# Patient Record
Sex: FEMALE | Race: WHITE | NOT HISPANIC OR LATINO | Employment: OTHER | URBAN - METROPOLITAN AREA
[De-identification: names, ages, dates, MRNs, and addresses within clinical notes are randomized per-mention and may not be internally consistent; named-entity substitution may affect disease eponyms.]

---

## 2023-01-20 PROBLEM — T46.6X5A ADVERSE REACTION TO STATIN MEDICATION: Status: ACTIVE | Noted: 2023-01-20

## 2023-01-20 PROBLEM — J06.9 UPPER RESPIRATORY INFECTION WITH COUGH AND CONGESTION: Status: ACTIVE | Noted: 2023-01-20

## 2023-01-20 PROBLEM — R92.2 DENSE BREASTS: Status: ACTIVE | Noted: 2023-01-20

## 2023-01-20 PROBLEM — T14.8XXA SUPERFICIAL BRUISING: Status: ACTIVE | Noted: 2023-01-20

## 2023-01-20 PROBLEM — M62.549: Status: ACTIVE | Noted: 2023-01-20

## 2023-01-20 PROBLEM — J04.0 LARYNGITIS: Status: ACTIVE | Noted: 2023-01-20

## 2023-01-20 PROBLEM — B07.9 VERRUCOUS LESION OF SKIN: Status: ACTIVE | Noted: 2023-01-20

## 2023-01-20 PROBLEM — E55.9 VITAMIN D DEFICIENCY: Status: ACTIVE | Noted: 2023-01-20

## 2023-01-20 PROBLEM — H81.10 BPV (BENIGN POSITIONAL VERTIGO): Status: ACTIVE | Noted: 2023-01-20

## 2023-01-20 PROBLEM — Z86.010 HISTORY OF COLON POLYPS: Status: ACTIVE | Noted: 2023-01-20

## 2023-01-20 PROBLEM — E11.9 TYPE 2 DIABETES MELLITUS WITH INSULIN THERAPY (MULTI): Status: ACTIVE | Noted: 2023-01-20

## 2023-01-20 PROBLEM — E11.319 DIABETIC RETINOPATHY (MULTI): Status: ACTIVE | Noted: 2023-01-20

## 2023-01-20 PROBLEM — T21.22XA SECOND DEGREE BURN OF ABDOMEN: Status: ACTIVE | Noted: 2023-01-20

## 2023-01-20 PROBLEM — Z86.0100 HISTORY OF COLON POLYPS: Status: ACTIVE | Noted: 2023-01-20

## 2023-01-20 PROBLEM — Z98.890 HISTORY OF STENT INSERTION OF RENAL ARTERY: Status: ACTIVE | Noted: 2023-01-20

## 2023-01-20 PROBLEM — E66.811 CLASS 1 OBESITY WITH BODY MASS INDEX (BMI) OF 30.0 TO 30.9 IN ADULT: Status: ACTIVE | Noted: 2023-01-20

## 2023-01-20 PROBLEM — E11.3393 MODERATE NONPROLIFERATIVE DIABETIC RETINOPATHY OF BOTH EYES WITHOUT MACULAR EDEMA ASSOCIATED WITH TYPE 2 DIABETES MELLITUS (MULTI): Status: ACTIVE | Noted: 2023-01-20

## 2023-01-20 PROBLEM — I10 HYPERTENSION: Status: ACTIVE | Noted: 2023-01-20

## 2023-01-20 PROBLEM — R93.1 AGATSTON CAC SCORE, >400: Status: ACTIVE | Noted: 2023-01-20

## 2023-01-20 PROBLEM — E66.9 CLASS 1 OBESITY WITH BODY MASS INDEX (BMI) OF 30.0 TO 30.9 IN ADULT: Status: ACTIVE | Noted: 2023-01-20

## 2023-01-20 PROBLEM — E11.69 HYPERLIPIDEMIA ASSOCIATED WITH TYPE 2 DIABETES MELLITUS (MULTI): Status: ACTIVE | Noted: 2023-01-20

## 2023-01-20 PROBLEM — E53.8 B12 NUTRITIONAL DEFICIENCY: Status: ACTIVE | Noted: 2023-01-20

## 2023-01-20 PROBLEM — R80.9 PROTEINURIA: Status: ACTIVE | Noted: 2023-01-20

## 2023-01-20 PROBLEM — I25.10 CORONARY ARTERY DISEASE: Status: ACTIVE | Noted: 2023-01-20

## 2023-01-20 PROBLEM — E78.5 HYPERLIPIDEMIA ASSOCIATED WITH TYPE 2 DIABETES MELLITUS (MULTI): Status: ACTIVE | Noted: 2023-01-20

## 2023-01-20 PROBLEM — R92.30 DENSE BREASTS: Status: ACTIVE | Noted: 2023-01-20

## 2023-01-20 PROBLEM — R42 VERTIGO: Status: ACTIVE | Noted: 2023-01-20

## 2023-01-20 PROBLEM — R01.1 MURMUR: Status: ACTIVE | Noted: 2023-01-20

## 2023-01-20 PROBLEM — D89.89 KAPPA LIGHT CHAIN DISEASE (MULTI): Status: ACTIVE | Noted: 2023-01-20

## 2023-01-20 PROBLEM — F41.8 SITUATIONAL ANXIETY: Status: ACTIVE | Noted: 2023-01-20

## 2023-01-20 PROBLEM — N36.2 URETHRAL POLYP: Status: ACTIVE | Noted: 2023-01-20

## 2023-01-20 PROBLEM — M50.20 HERNIATED CERVICAL DISC: Status: ACTIVE | Noted: 2023-01-20

## 2023-01-20 PROBLEM — Z79.4 TYPE 2 DIABETES MELLITUS WITH INSULIN THERAPY (MULTI): Status: ACTIVE | Noted: 2023-01-20

## 2023-01-20 PROBLEM — Z86.39 HISTORY OF HYPERKALEMIA: Status: ACTIVE | Noted: 2023-01-20

## 2023-01-20 PROBLEM — E11.649 TYPE 2 DIABETES MELLITUS WITH HYPOGLYCEMIA WITHOUT COMA, WITH LONG-TERM CURRENT USE OF INSULIN (MULTI): Status: ACTIVE | Noted: 2023-01-20

## 2023-01-20 PROBLEM — M79.2 NEURALGIA: Status: ACTIVE | Noted: 2023-01-20

## 2023-01-20 PROBLEM — Z79.4 TYPE 2 DIABETES MELLITUS WITH HYPOGLYCEMIA WITHOUT COMA, WITH LONG-TERM CURRENT USE OF INSULIN (MULTI): Status: ACTIVE | Noted: 2023-01-20

## 2023-01-20 PROBLEM — G56.20 NEUROPATHY, ULNAR AT ELBOW: Status: ACTIVE | Noted: 2023-01-20

## 2023-01-20 PROBLEM — N18.30 CKD (CHRONIC KIDNEY DISEASE), STAGE III (MULTI): Status: ACTIVE | Noted: 2023-01-20

## 2023-01-20 PROBLEM — M51.9 INTERVERTEBRAL DISC DISEASE: Status: ACTIVE | Noted: 2023-01-20

## 2023-01-20 PROBLEM — Q21.10 ATRIAL SEPTAL DEFECT (HHS-HCC): Status: ACTIVE | Noted: 2023-01-20

## 2023-01-20 PROBLEM — E11.21 DIABETIC NEPHROPATHY ASSOCIATED WITH TYPE 2 DIABETES MELLITUS (MULTI): Status: ACTIVE | Noted: 2023-01-20

## 2023-01-20 PROBLEM — M85.852 OSTEOPENIA OF NECK OF LEFT FEMUR: Status: ACTIVE | Noted: 2023-01-20

## 2023-01-20 PROBLEM — D47.2 MGUS (MONOCLONAL GAMMOPATHY OF UNKNOWN SIGNIFICANCE): Status: ACTIVE | Noted: 2023-01-20

## 2023-01-20 PROBLEM — I70.1 RENAL ARTERY STENOSIS (CMS-HCC): Status: ACTIVE | Noted: 2023-01-20

## 2023-01-20 PROBLEM — M54.12 CERVICAL RADICULOPATHY: Status: ACTIVE | Noted: 2023-01-20

## 2023-01-20 PROBLEM — T30.0 BURN: Status: ACTIVE | Noted: 2023-01-20

## 2023-01-20 PROBLEM — K59.09 CHRONIC CONSTIPATION: Status: ACTIVE | Noted: 2023-01-20

## 2023-01-20 PROBLEM — M67.432 GANGLION OF LEFT WRIST: Status: ACTIVE | Noted: 2023-01-20

## 2023-01-20 PROBLEM — R82.90 ABNORMAL URINE FINDINGS: Status: ACTIVE | Noted: 2023-01-20

## 2023-01-20 RX ORDER — ASPIRIN 81 MG/1
1 TABLET ORAL DAILY
COMMUNITY
Start: 2016-02-01

## 2023-01-20 RX ORDER — MECLIZINE HCL 12.5 MG 12.5 MG/1
1-2 TABLET ORAL ONCE
COMMUNITY
Start: 2012-06-18 | End: 2023-06-07 | Stop reason: SDUPTHER

## 2023-01-20 RX ORDER — AMLODIPINE BESYLATE 5 MG/1
1 TABLET ORAL ONCE
COMMUNITY
Start: 2020-07-07 | End: 2023-06-07 | Stop reason: SDUPTHER

## 2023-01-20 RX ORDER — INSULIN HUMAN 100 [IU]/ML
14 INJECTION, SUSPENSION SUBCUTANEOUS NIGHTLY
COMMUNITY
Start: 2020-11-12 | End: 2023-10-02 | Stop reason: ALTCHOICE

## 2023-01-20 RX ORDER — ACETAMINOPHEN 500 MG
50 TABLET ORAL ONCE
COMMUNITY
Start: 2019-10-15 | End: 2023-03-07 | Stop reason: ENTERED-IN-ERROR

## 2023-01-20 RX ORDER — HYDROCHLOROTHIAZIDE 25 MG/1
1 TABLET ORAL ONCE
COMMUNITY
Start: 2021-01-20 | End: 2023-12-05 | Stop reason: SDUPTHER

## 2023-01-20 RX ORDER — MAGNESIUM 200 MG
TABLET ORAL
COMMUNITY
Start: 2022-05-11

## 2023-01-20 RX ORDER — ALBUTEROL SULFATE 90 UG/1
1 AEROSOL, METERED RESPIRATORY (INHALATION) 3 TIMES DAILY
COMMUNITY
Start: 2022-02-10 | End: 2023-10-15 | Stop reason: ALTCHOICE

## 2023-01-20 RX ORDER — LORATADINE 10 MG/1
TABLET ORAL
COMMUNITY
End: 2023-06-07 | Stop reason: SDUPTHER

## 2023-01-20 RX ORDER — FLUVASTATIN 40 MG/1
40 CAPSULE ORAL NIGHTLY
COMMUNITY
Start: 2019-12-16 | End: 2023-10-09 | Stop reason: SDUPTHER

## 2023-01-20 RX ORDER — CARVEDILOL 25 MG/1
25 TABLET ORAL
COMMUNITY
Start: 2021-04-27 | End: 2023-12-05 | Stop reason: SDUPTHER

## 2023-01-20 RX ORDER — EZETIMIBE 10 MG/1
1 TABLET ORAL DAILY
COMMUNITY
Start: 2021-03-30 | End: 2023-10-09 | Stop reason: SDUPTHER

## 2023-01-20 RX ORDER — INSULIN LISPRO 100 [IU]/ML
75 INJECTION, SOLUTION INTRAVENOUS; SUBCUTANEOUS ONCE
COMMUNITY
Start: 2020-11-12 | End: 2023-08-08 | Stop reason: SDUPTHER

## 2023-02-24 PROBLEM — M19.90 OSTEOARTHRITIS: Status: ACTIVE | Noted: 2023-02-24

## 2023-03-07 ENCOUNTER — OFFICE VISIT (OUTPATIENT)
Dept: PRIMARY CARE | Facility: CLINIC | Age: 78
End: 2023-03-07
Payer: MEDICARE

## 2023-03-07 VITALS
RESPIRATION RATE: 18 BRPM | WEIGHT: 167 LBS | DIASTOLIC BLOOD PRESSURE: 54 MMHG | HEART RATE: 64 BPM | BODY MASS INDEX: 32.61 KG/M2 | SYSTOLIC BLOOD PRESSURE: 132 MMHG

## 2023-03-07 DIAGNOSIS — D89.89 KAPPA LIGHT CHAIN DISEASE (MULTI): ICD-10-CM

## 2023-03-07 DIAGNOSIS — I25.10 CORONARY ARTERY DISEASE INVOLVING NATIVE CORONARY ARTERY OF NATIVE HEART WITHOUT ANGINA PECTORIS: ICD-10-CM

## 2023-03-07 DIAGNOSIS — E11.3393 MODERATE NONPROLIFERATIVE DIABETIC RETINOPATHY OF BOTH EYES WITHOUT MACULAR EDEMA ASSOCIATED WITH TYPE 2 DIABETES MELLITUS (MULTI): ICD-10-CM

## 2023-03-07 DIAGNOSIS — M17.11 PRIMARY OSTEOARTHRITIS OF RIGHT KNEE: ICD-10-CM

## 2023-03-07 DIAGNOSIS — N18.32 STAGE 3B CHRONIC KIDNEY DISEASE (MULTI): ICD-10-CM

## 2023-03-07 DIAGNOSIS — Z00.01 ANNUAL VISIT FOR GENERAL ADULT MEDICAL EXAMINATION WITH ABNORMAL FINDINGS: ICD-10-CM

## 2023-03-07 DIAGNOSIS — Z98.890 HISTORY OF STENT INSERTION OF RENAL ARTERY: ICD-10-CM

## 2023-03-07 DIAGNOSIS — E11.21 DIABETIC NEPHROPATHY ASSOCIATED WITH TYPE 2 DIABETES MELLITUS (MULTI): Primary | Chronic | ICD-10-CM

## 2023-03-07 DIAGNOSIS — Z79.4 TYPE 2 DIABETES MELLITUS WITH HYPOGLYCEMIA WITHOUT COMA, WITH LONG-TERM CURRENT USE OF INSULIN (MULTI): ICD-10-CM

## 2023-03-07 DIAGNOSIS — E11.9 TYPE 2 DIABETES MELLITUS WITH INSULIN THERAPY (MULTI): ICD-10-CM

## 2023-03-07 DIAGNOSIS — Z79.4 TYPE 2 DIABETES MELLITUS WITH INSULIN THERAPY (MULTI): ICD-10-CM

## 2023-03-07 DIAGNOSIS — Z00.00 ENCOUNTER FOR SUBSEQUENT ANNUAL WELLNESS VISIT (AWV) IN MEDICARE PATIENT: ICD-10-CM

## 2023-03-07 DIAGNOSIS — D47.2 MGUS (MONOCLONAL GAMMOPATHY OF UNKNOWN SIGNIFICANCE): ICD-10-CM

## 2023-03-07 DIAGNOSIS — E11.649 TYPE 2 DIABETES MELLITUS WITH HYPOGLYCEMIA WITHOUT COMA, WITH LONG-TERM CURRENT USE OF INSULIN (MULTI): ICD-10-CM

## 2023-03-07 DIAGNOSIS — I70.1 RENAL ARTERY STENOSIS (CMS-HCC): ICD-10-CM

## 2023-03-07 DIAGNOSIS — I10 PRIMARY HYPERTENSION: ICD-10-CM

## 2023-03-07 PROBLEM — J04.0 LARYNGITIS: Status: RESOLVED | Noted: 2023-01-20 | Resolved: 2023-03-07

## 2023-03-07 PROBLEM — T21.22XA SECOND DEGREE BURN OF ABDOMEN: Status: RESOLVED | Noted: 2023-01-20 | Resolved: 2023-03-07

## 2023-03-07 PROBLEM — T14.8XXA SUPERFICIAL BRUISING: Status: RESOLVED | Noted: 2023-01-20 | Resolved: 2023-03-07

## 2023-03-07 PROBLEM — R42 VERTIGO: Status: RESOLVED | Noted: 2023-01-20 | Resolved: 2023-03-07

## 2023-03-07 PROBLEM — J06.9 UPPER RESPIRATORY INFECTION WITH COUGH AND CONGESTION: Status: RESOLVED | Noted: 2023-01-20 | Resolved: 2023-03-07

## 2023-03-07 PROBLEM — F41.8 SITUATIONAL ANXIETY: Status: RESOLVED | Noted: 2023-01-20 | Resolved: 2023-03-07

## 2023-03-07 LAB — POC HEMOGLOBIN A1C: 8.2 % (ref 4.2–6.5)

## 2023-03-07 PROCEDURE — 3078F DIAST BP <80 MM HG: CPT | Performed by: INTERNAL MEDICINE

## 2023-03-07 PROCEDURE — 1036F TOBACCO NON-USER: CPT | Performed by: INTERNAL MEDICINE

## 2023-03-07 PROCEDURE — 1160F RVW MEDS BY RX/DR IN RCRD: CPT | Performed by: INTERNAL MEDICINE

## 2023-03-07 PROCEDURE — 83036 HEMOGLOBIN GLYCOSYLATED A1C: CPT | Performed by: INTERNAL MEDICINE

## 2023-03-07 PROCEDURE — 36416 COLLJ CAPILLARY BLOOD SPEC: CPT | Performed by: INTERNAL MEDICINE

## 2023-03-07 PROCEDURE — 99215 OFFICE O/P EST HI 40 MIN: CPT | Performed by: INTERNAL MEDICINE

## 2023-03-07 PROCEDURE — 1159F MED LIST DOCD IN RCRD: CPT | Performed by: INTERNAL MEDICINE

## 2023-03-07 PROCEDURE — 3075F SYST BP GE 130 - 139MM HG: CPT | Performed by: INTERNAL MEDICINE

## 2023-03-07 RX ORDER — INSULIN GLARGINE 100 [IU]/ML
18 INJECTION, SOLUTION SUBCUTANEOUS NIGHTLY
COMMUNITY
End: 2023-08-02 | Stop reason: SDUPTHER

## 2023-03-07 RX ORDER — CHOLECALCIFEROL (VITAMIN D3) 25 MCG
25 TABLET ORAL DAILY
COMMUNITY

## 2023-03-07 ASSESSMENT — PAIN SCALES - GENERAL: PAINLEVEL: 0-NO PAIN

## 2023-03-07 NOTE — PROGRESS NOTES
"Subjective   Patient ID: Eleonora Varela is a 77 y.o. female who presents for Follow-up (Pt here for follow up and review. Pt unsure of when her \"physical\" was. Pt was at Dermatologist in January and she had skin lesions removed from her face. She also had a Rt knee x-ray for which she has been doing water aerobics and Voltaren gel PRN. The \"water on the knee\" has resolved as well. Has also seen the nephrologist, endocrinologist. Needs A1C today.).    HPI   A1c 8.2 improved from 8.9 end of November.  She has retinopathy and has noticed a change in that field is more narrow out of the left eye-she needs to call dr poole and not wait until appt. In June.    Right xray pain and swelling severe djd patello femoral. Is icing water aerobics and other exercises with her sister is helping . The voltaren gel helped too    Derm had freezing of brown spots and nothing else was a problem saw pa at [facility].    Diabetic nephropathy-Saw Dr howell in January and said come back in a year-all stable    Mgus-sees dr harris in April and kappa light chain    Scribe Transcription:  Htn:She needs refills on her HCTZ and carvedilol.    Cardiac: No CP, palpitations, increased snyder  Resp: No sob, wheezing, cough  Extremities: No leg or ankle swelling, no claudication  Neuro-No dizziness, syncope, blurred vision. No new headaches.   She brought in a list of bp in today.     Diabetes:  Diabetes:Type II:Is taking medications regularly, denies side effects.  Denies  polyuria, polydipsia.  Only hypoglycemia isocc when she swims for exercises and she watches for this.she cuts back her insulin on these days.  No new numbness or paresthesias of extremities.No syncope or dizziness.No blurred vision.  Lab Results   Component Value Date    HGBA1C 8.2 (A) 03/07/2023    HGBA1C 7.4 (A) 11/15/2021     Lab Results   Component Value Date    CREATININE 1.55 (H) 01/03/2023         Lab Results   Component Value Date    GLUCOSE 435 (H) 01/03/2023    " CALCIUM 9.4 01/03/2023     01/03/2023    K 5.1 01/03/2023    CO2 29 01/03/2023    CL 99 01/03/2023    BUN 21 01/03/2023    CREATININE 1.55 (H) 01/03/2023        Meds insulin glargine/lantus and humalog. And nph.      She states she went to dermatology who froze some brown lesions off for her.    She reports doing water aerobics with her sister.     She has a f/u for her stent in June. She had her renal artery stented in May 2022 and there has been an improvement in her bp since then.       Review of Systems    Objective   /54 (BP Location: Right arm, Patient Position: Sitting, BP Cuff Size: Adult)   Pulse 64   Resp 18   Wt 75.8 kg (167 lb)   BMI 32.61 kg/m²    Latest Reference Range & Units 01/03/23 16:07   Creatinine 0.50 - 1.05 mg/dL 1.55 (H)   Blood Urea Nitrogen 6 - 23 mg/dL 21   Anion Gap 10 - 20 mmol/L 14   Bicarbonate 21 - 32 mmol/L 29   Total Protein 6.4 - 8.2 g/dL 6.3 (L)   POTASSIUM 3.5 - 5.3 mmol/L 5.1   SODIUM 136 - 145 mmol/L 137   CHLORIDE 98 - 107 mmol/L 99   PHOSPHORUS 2.5 - 4.9 mg/dL 3.7   GLUCOSE 74 - 99 mg/dL 435 (H)   Calcium 8.6 - 10.6 mg/dL 9.4   Albumin 3.4 - 5.0 g/dL 4.2   Parathyroid Hormone, Intact 18.5 - 88.0 pg/mL 81.6   Vitamin D, 25-Hydroxy, Total ng/mL 29 !   Protein Electrophoresis Comment  NORMAL   Albumin 3.4 - 5.0 g/dL 4.0   Alpha 1 Globulin 0.2 - 0.6 g/dL 0.3   Alpha 2 Globulin 0.4 - 1.1 g/dL 0.6   Gamma 0.5 - 1.4 g/dL 0.7   Beta Globulin 0.5 - 1.2 g/dL 0.7   Interpretation  NORMAL   Alpha 1 Globulin/Protein Total, Ur Not Established % 8.3   Alpha 2 Globulin/Protein Total, Ur Not Established % 11.7   Beta Globulin/Protein Total, Ur Not Established % 18.5   Gamma Globulin/Protein Total, Ur Not Established % 16.7   Path Review-Urine Protein Electrophoresis   A.HAWKINS   Path Review - Serum Protein Electrophoresis   CELESTINO   Creatinine, Urine Random 20.0 - 320.0 mg/dL 47.6   Albumin/Creatine Ratio 0.0 - 30.0 ug/mg crt 26.7   Total Protein, Urine 5 - 24 mg/dL 7    Albumin/Protein Total, Ur Not Established % 44.8   ALBUMIN (MG/L) IN URINE Not Established mg/L 12.7   GFR Female >90 mL/min/1.73m2 34 !   (H): Data is abnormally high  (L): Data is abnormally low  !: Data is abnormal   Latest Reference Range & Units 09/16/22 09:44   Bilirubin Total 0.0 - 1.2 mg/dL 0.8   Creatinine 0.50 - 1.05 mg/dL 1.25 (H)   Blood Urea Nitrogen 6 - 23 mg/dL 22   Anion Gap 10 - 20 mmol/L 13   Alkaline Phosphatase 33 - 136 U/L 80   ALT 7 - 45 U/L 21   AST 9 - 39 U/L 18   Bicarbonate 21 - 32 mmol/L 28   Total Protein 6.4 - 8.2 g/dL 6.1 (L)   POTASSIUM 3.5 - 5.3 mmol/L 4.5   SODIUM 136 - 145 mmol/L 140   CHLORIDE 98 - 107 mmol/L 104   GLUCOSE 74 - 99 mg/dL 170 (H)   Calcium 8.6 - 10.6 mg/dL 9.5   Albumin 3.4 - 5.0 g/dL 3.9   nRBC 0.0 - 0.0 /100 WBC 0.0   HEMOGLOBIN 12.0 - 16.0 g/dL 14.4   HEMATOCRIT 36.0 - 46.0 % 42.2   RED CELL DISTRIBUTION WIDTH 11.5 - 14.5 % 12.0   Lymphocytes Absolute 0.80 - 3.00 x10E9/L 1.94   Monocytes Absolute 0.05 - 0.80 x10E9/L 0.40   Eosinophils Absolute 0.00 - 0.40 x10E9/L 0.16   Basophils Absolute 0.00 - 0.10 x10E9/L 0.04   Neutrophils % 40.0 - 80.0 % 57.0   Lymphocytes % 13.0 - 44.0 % 32.7   Monocytes % 2.0 - 10.0 % 6.7   Eosinophils % 0.0 - 6.0 % 2.7   Basophils % 0.0 - 2.0 % 0.7   Neutrophils Absolute 1.60 - 5.50 x10E9/L 3.38   MCHC 32.0 - 36.0 g/dL 34.1   MCV 80 - 100 fL 89   Platelets 150 - 450 x10E9/L 196   RBC 4.00 - 5.20 x10E12/L 4.72   WBC 4.4 - 11.3 x10E9/L 5.9   Immature Granulocytes %, Automated 0.0 - 0.9 % 0.2   Ig Kappa Free Light Chain 0.33 - 1.94 mg/dL 3.28 (H)   Ig Lambda Free Light Chain 0.57 - 2.63 mg/dL 2.51   GFR Female >90 mL/min/1.73m2 45 !   Kappa/Lambda LC Ratio 0.26 - 1.65  1.31   (H): Data is abnormally high  (L): Data is abnormally low  !: Data is abnormal    Physical Exam  Vitals and nursing note reviewed.   Constitutional:       General: She is not in acute distress.     Appearance: Normal appearance. She is not ill-appearing.   HENT:       Head: Normocephalic and atraumatic.      Comments: Mask in place per protocol.  Eyes:      Conjunctiva/sclera: Conjunctivae normal.   Neck:      Vascular: No carotid bruit.   Cardiovascular:      Rate and Rhythm: Normal rate and regular rhythm.      Pulses:           Carotid pulses are 2+ on the right side and 2+ on the left side.       Posterior tibial pulses are 2+ on the right side and 2+ on the left side.      Heart sounds: Murmur heard.      Crescendo systolic murmur is present with a grade of 1/6.      No friction rub. No gallop.      Comments: No clicks  Pulmonary:      Effort: Pulmonary effort is normal.      Breath sounds: Normal breath sounds. No wheezing, rhonchi or rales.      Comments: Examined bilaterally, A/P/Lat  Abdominal:      General: Abdomen is flat. Bowel sounds are normal.      Palpations: Abdomen is soft. There is no hepatomegaly, splenomegaly, mass or pulsatile mass.      Tenderness: There is no abdominal tenderness.   Musculoskeletal:         General: No swelling or deformity.      Cervical back: Neck supple.      Right lower leg: No edema.      Left lower leg: No edema.   Lymphadenopathy:      Cervical: No cervical adenopathy.   Skin:     Coloration: Skin is not jaundiced.      Findings: No bruising, lesion or rash.   Neurological:      General: No focal deficit present.      Mental Status: She is alert. Mental status is at baseline.      Cranial Nerves: No cranial nerve deficit.      Coordination: Coordination normal.      Gait: Gait normal.      Comments: Walks in unassisted.   Psychiatric:         Mood and Affect: Mood normal.         Behavior: Behavior normal.         Thought Content: Thought content normal.         Judgment: Judgment normal.           Assessment/Plan   Her diabetes is still not well controlled but is improving. Her endo is retiring and she does not have a new endo as of yet.  We can follow here if she does not find one.    Concerned about her vision change and she  needs to see her eye doc asap.    HTN is stable no med changes  All other conditions below reviewed and stable.  S/p stenting of renal artery.  She is going kamar more focused on her diet.  Problem List Items Addressed This Visit          Circulatory    Coronary artery disease    Relevant Orders    Lipid Panel    Follow Up In [facility] - PCP    Hypertension    Relevant Orders    Follow Up In [facility] - PCP    Albumin, urine, random    Renal artery stenosis (CMS/HCC)    Relevant Orders    Follow Up In [facility] - PCP       Genitourinary    CKD (chronic kidney disease), stage III (CMS/HCC)    Relevant Orders    Follow Up In [facility] - PCP       Musculoskeletal    Osteoarthritis    Relevant Orders    Follow Up In [facility] - PCP       Endocrine/Metabolic    Diabetic nephropathy associated with type 2 diabetes mellitus (CMS/[facility]) - Primary    Relevant Orders    POCT glycosylated hemoglobin (Hb A1C) manually resulted (Completed)    Comprehensive Metabolic Panel    Follow Up In [facility] - PCP    Moderate nonproliferative diabetic retinopathy of both eyes without macular edema associated with type 2 diabetes mellitus (CMS/HCC)    Relevant Orders    Follow Up In [facility] - PCP    Type 2 diabetes mellitus with hypoglycemia without coma, with long-term current use of insulin (CMS/HCC)    Relevant Orders    TSH with reflex to Free T4 if abnormal    Vitamin B12    Follow Up In [facility] - PCP    Type 2 diabetes mellitus with insulin therapy (CMS/[facility])    Relevant Orders    Follow Up In [facility] - PCP       Immune    Kappa light chain disease (CMS/HCC)    Relevant Orders  stable and has follow up and labs planned with heme onc    Follow Up In [facility] - PCP    MGUS (monoclonal gammopathy of unknown significance)    Relevant Orders stable and see above.    Follow Up In [facility] - PCP       Other     History of stent insertion of renal artery    Relevant Orders bp is good. No new issues.    Follow Up In [facility] - PCP     Other Visit Diagnoses       Encounter for subsequent annual wellness visit (AWV) in Medicare patient        Relevant Orders    Follow Up In [facility] - PCP    Annual visit for general adult medical examination with abnormal findings        Relevant Orders    Follow Up In [facility] - PCP     Travel advice discussed. For infection anddvt prevention.

## 2023-03-07 NOTE — PATIENT INSTRUCTIONS
Keep working on getting those sugars down, and also avoiding the lows after swimming. Your A1C is still too high but it is improving.    Blood pressures are good, same medications.    Glad your knee is improving, exercise and ice as needed are important. Voltaren/diclofenac gel can be used again if needed.    Air travel: mask on plane and in airport. Keep handgel with you or wipes with 70% alcohol.  Hydrate, walk around on the plane when you can. Compression socks knee highs recommended.    The week before your visit here, fasting blood test to check lipids, thyroid b12 and chemistries.  Dr Yi checks your cbc.  Urine albumin we will do at your visit as well as the a1c.    Call your retina eye doctor for an appt. Asap about the recent vision change in the left eye.  Follow up for your wellness visit and also on the diabetes hypertension and labs in 3 months.

## 2023-03-27 LAB
ALANINE AMINOTRANSFERASE (SGPT) (U/L) IN SER/PLAS: 32 U/L (ref 7–45)
ALBUMIN (G/DL) IN SER/PLAS: 3.9 G/DL (ref 3.4–5)
ALKALINE PHOSPHATASE (U/L) IN SER/PLAS: 76 U/L (ref 33–136)
ANION GAP IN SER/PLAS: 11 MMOL/L (ref 10–20)
ASPARTATE AMINOTRANSFERASE (SGOT) (U/L) IN SER/PLAS: 22 U/L (ref 9–39)
BASOPHILS (10*3/UL) IN BLOOD BY AUTOMATED COUNT: 0.04 X10E9/L (ref 0–0.1)
BASOPHILS/100 LEUKOCYTES IN BLOOD BY AUTOMATED COUNT: 0.6 % (ref 0–2)
BILIRUBIN TOTAL (MG/DL) IN SER/PLAS: 0.9 MG/DL (ref 0–1.2)
CALCIUM (MG/DL) IN SER/PLAS: 9.4 MG/DL (ref 8.6–10.6)
CARBON DIOXIDE, TOTAL (MMOL/L) IN SER/PLAS: 30 MMOL/L (ref 21–32)
CHLORIDE (MMOL/L) IN SER/PLAS: 100 MMOL/L (ref 98–107)
CREATININE (MG/DL) IN SER/PLAS: 1.33 MG/DL (ref 0.5–1.05)
EOSINOPHILS (10*3/UL) IN BLOOD BY AUTOMATED COUNT: 0.21 X10E9/L (ref 0–0.4)
EOSINOPHILS/100 LEUKOCYTES IN BLOOD BY AUTOMATED COUNT: 3.2 % (ref 0–6)
ERYTHROCYTE DISTRIBUTION WIDTH (RATIO) BY AUTOMATED COUNT: 11.8 % (ref 11.5–14.5)
ERYTHROCYTE MEAN CORPUSCULAR HEMOGLOBIN CONCENTRATION (G/DL) BY AUTOMATED: 33.3 G/DL (ref 32–36)
ERYTHROCYTE MEAN CORPUSCULAR VOLUME (FL) BY AUTOMATED COUNT: 90 FL (ref 80–100)
ERYTHROCYTES (10*6/UL) IN BLOOD BY AUTOMATED COUNT: 4.8 X10E12/L (ref 4–5.2)
GFR FEMALE: 41 ML/MIN/1.73M2
GLUCOSE (MG/DL) IN SER/PLAS: 231 MG/DL (ref 74–99)
HEMATOCRIT (%) IN BLOOD BY AUTOMATED COUNT: 43.3 % (ref 36–46)
HEMOGLOBIN (G/DL) IN BLOOD: 14.4 G/DL (ref 12–16)
IMMATURE GRANULOCYTES/100 LEUKOCYTES IN BLOOD BY AUTOMATED COUNT: 0.3 % (ref 0–0.9)
LEUKOCYTES (10*3/UL) IN BLOOD BY AUTOMATED COUNT: 6.6 X10E9/L (ref 4.4–11.3)
LYMPHOCYTES (10*3/UL) IN BLOOD BY AUTOMATED COUNT: 2.34 X10E9/L (ref 0.8–3)
LYMPHOCYTES/100 LEUKOCYTES IN BLOOD BY AUTOMATED COUNT: 35.4 % (ref 13–44)
MONOCYTES (10*3/UL) IN BLOOD BY AUTOMATED COUNT: 0.4 X10E9/L (ref 0.05–0.8)
MONOCYTES/100 LEUKOCYTES IN BLOOD BY AUTOMATED COUNT: 6.1 % (ref 2–10)
NEUTROPHILS (10*3/UL) IN BLOOD BY AUTOMATED COUNT: 3.6 X10E9/L (ref 1.6–5.5)
NEUTROPHILS/100 LEUKOCYTES IN BLOOD BY AUTOMATED COUNT: 54.4 % (ref 40–80)
NRBC (PER 100 WBCS) BY AUTOMATED COUNT: 0 /100 WBC (ref 0–0)
PLATELETS (10*3/UL) IN BLOOD AUTOMATED COUNT: 180 X10E9/L (ref 150–450)
POTASSIUM (MMOL/L) IN SER/PLAS: 4.2 MMOL/L (ref 3.5–5.3)
PROTEIN TOTAL: 6.5 G/DL (ref 6.4–8.2)
SODIUM (MMOL/L) IN SER/PLAS: 137 MMOL/L (ref 136–145)
UREA NITROGEN (MG/DL) IN SER/PLAS: 26 MG/DL (ref 6–23)

## 2023-03-28 LAB
IMMUNOGLOBULIN LIGHT CHAINS KAPPA/LAMBDA (MASS RATIO) IN SERUM: 1.47 (ref 0.26–1.65)
IMMUNOGLOBULIN LIGHT CHAINS.KAPPA (MG/DL) IN SERUM: 3.2 MG/DL (ref 0.33–1.94)
IMMUNOGLOBULIN LIGHT CHAINS.LAMBDA (MG/DL) IN SERUM: 2.17 MG/DL (ref 0.57–2.63)

## 2023-04-05 ENCOUNTER — OFFICE VISIT (OUTPATIENT)
Dept: PRIMARY CARE | Facility: CLINIC | Age: 78
End: 2023-04-05
Payer: MEDICARE

## 2023-04-05 VITALS
WEIGHT: 158 LBS | HEIGHT: 60 IN | RESPIRATION RATE: 18 BRPM | TEMPERATURE: 99.7 F | HEART RATE: 77 BPM | SYSTOLIC BLOOD PRESSURE: 140 MMHG | OXYGEN SATURATION: 95 % | DIASTOLIC BLOOD PRESSURE: 58 MMHG | BODY MASS INDEX: 31.02 KG/M2

## 2023-04-05 DIAGNOSIS — J02.9 ACUTE PHARYNGITIS, UNSPECIFIED ETIOLOGY: ICD-10-CM

## 2023-04-05 DIAGNOSIS — I10 PRIMARY HYPERTENSION: ICD-10-CM

## 2023-04-05 DIAGNOSIS — Z79.4 TYPE 2 DIABETES MELLITUS WITH INSULIN THERAPY (MULTI): ICD-10-CM

## 2023-04-05 DIAGNOSIS — J02.0 STREP PHARYNGITIS: Primary | ICD-10-CM

## 2023-04-05 DIAGNOSIS — Z79.4 TYPE 2 DIABETES MELLITUS WITH HYPOGLYCEMIA WITHOUT COMA, WITH LONG-TERM CURRENT USE OF INSULIN (MULTI): ICD-10-CM

## 2023-04-05 DIAGNOSIS — E11.9 TYPE 2 DIABETES MELLITUS WITH INSULIN THERAPY (MULTI): ICD-10-CM

## 2023-04-05 DIAGNOSIS — E11.649 TYPE 2 DIABETES MELLITUS WITH HYPOGLYCEMIA WITHOUT COMA, WITH LONG-TERM CURRENT USE OF INSULIN (MULTI): ICD-10-CM

## 2023-04-05 DIAGNOSIS — E11.3393 MODERATE NONPROLIFERATIVE DIABETIC RETINOPATHY OF BOTH EYES WITHOUT MACULAR EDEMA ASSOCIATED WITH TYPE 2 DIABETES MELLITUS (MULTI): ICD-10-CM

## 2023-04-05 DIAGNOSIS — J02.9 ACUTE PHARYNGITIS, UNSPECIFIED ETIOLOGY: Primary | ICD-10-CM

## 2023-04-05 DIAGNOSIS — R50.81 FEVER IN OTHER DISEASES: ICD-10-CM

## 2023-04-05 DIAGNOSIS — J02.9 SORE THROAT: ICD-10-CM

## 2023-04-05 DIAGNOSIS — N18.32 STAGE 3B CHRONIC KIDNEY DISEASE (MULTI): ICD-10-CM

## 2023-04-05 LAB — POC RAPID STREP: POSITIVE

## 2023-04-05 PROCEDURE — 3077F SYST BP >= 140 MM HG: CPT | Performed by: INTERNAL MEDICINE

## 2023-04-05 PROCEDURE — 3078F DIAST BP <80 MM HG: CPT | Performed by: INTERNAL MEDICINE

## 2023-04-05 PROCEDURE — 1159F MED LIST DOCD IN RCRD: CPT | Performed by: INTERNAL MEDICINE

## 2023-04-05 PROCEDURE — 99213 OFFICE O/P EST LOW 20 MIN: CPT | Performed by: INTERNAL MEDICINE

## 2023-04-05 PROCEDURE — 87880 STREP A ASSAY W/OPTIC: CPT | Performed by: INTERNAL MEDICINE

## 2023-04-05 PROCEDURE — 1036F TOBACCO NON-USER: CPT | Performed by: INTERNAL MEDICINE

## 2023-04-05 PROCEDURE — 1160F RVW MEDS BY RX/DR IN RCRD: CPT | Performed by: INTERNAL MEDICINE

## 2023-04-05 RX ORDER — AMOXICILLIN AND CLAVULANATE POTASSIUM 875; 125 MG/1; MG/1
TABLET, FILM COATED ORAL
Qty: 20 TABLET | Refills: 0 | Status: SHIPPED | OUTPATIENT
Start: 2023-04-05 | End: 2023-06-07 | Stop reason: SDUPTHER

## 2023-04-05 RX ORDER — METFORMIN HYDROCHLORIDE 1000 MG/1
1 TABLET ORAL
COMMUNITY
Start: 2011-05-03 | End: 2023-04-05 | Stop reason: WASHOUT

## 2023-04-05 RX ORDER — FLAXSEED OIL 1000 MG
1000 CAPSULE ORAL 2 TIMES DAILY
COMMUNITY
End: 2023-04-05 | Stop reason: WASHOUT

## 2023-04-05 RX ORDER — AMOXICILLIN AND CLAVULANATE POTASSIUM 875; 125 MG/1; MG/1
875 TABLET, FILM COATED ORAL 2 TIMES DAILY
Qty: 20 TABLET | Refills: 0 | Status: SHIPPED | OUTPATIENT
Start: 2023-04-05 | End: 2023-04-15

## 2023-04-05 RX ORDER — AMOXICILLIN AND CLAVULANATE POTASSIUM 875; 125 MG/1; MG/1
TABLET, FILM COATED ORAL
Qty: 20 TABLET | Refills: 0
Start: 2023-04-05 | End: 2023-04-05 | Stop reason: SDUPTHER

## 2023-04-05 RX ORDER — AMOXICILLIN AND CLAVULANATE POTASSIUM 875; 125 MG/1; MG/1
TABLET, FILM COATED ORAL
Qty: 20 TABLET | Refills: 0 | OUTPATIENT
Start: 2023-04-05

## 2023-04-05 ASSESSMENT — PAIN SCALES - GENERAL: PAINLEVEL: 2

## 2023-04-05 NOTE — PROGRESS NOTES
Subjective   Patient ID: Eleonora Varela is a 77 y.o. female who presents for Sore Throat (Pt here for sore throat and swelling in her throat.).      END INFO FROM PRIOR VISIT  HPI  Here for work in with sore throat and she thought swollen uvula and fever, see phone message about same.  Ill x 24-48 hours. 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  Pt is diabetic, sugars are ok.  Bp was high yesterday she is not sure why. Felt fine,   Today and yesterday, no cp palp sob cough wheezing.   No diarrhea or abd pain  Can swallow liquids an dfoods, just hurts to do so.      Review of Systems    Objective   Lab Results   Component Value Date    WBC 6.6 03/27/2023    HGB 14.4 03/27/2023    HCT 43.3 03/27/2023     03/27/2023    CHOL 129 04/27/2022    TRIG 160 (H) 04/27/2022    HDL 41.8 04/27/2022    ALT 32 03/27/2023    AST 22 03/27/2023     03/27/2023    K 4.2 03/27/2023     03/27/2023    CREATININE 1.33 (H) 03/27/2023    BUN 26 (H) 03/27/2023    CO2 30 03/27/2023    TSH 2.38 04/27/2022    INR 0.9 05/09/2022    HGBA1C 8.2 (A) 03/07/2023     par  Physical ExamBP 140/58 (BP Location: Right arm, Patient Position: Sitting, BP Cuff Size: Large adult)   Pulse 77   Temp 37.6 °C (99.7 °F) (Oral)   Resp 18   Ht 1.524 m (5')   Wt 71.7 kg (158 lb)   SpO2 95%   BMI 30.86 kg/m²   Patient looks well and is in no obvious distress. But voice sounds a little horse or congested. Not in distress.  States sugar are good.  HEENT:   Normocephalic, no facial asymmetry  Eyes: Sclera and conjunctiva are clear.  Uvula minimally swollen, plenty of space there.  Tongue a little dyry and coated  Post pharynx erythema and mild swelling  No stridor  Neck: mild upper ant cerv adenopathy bilateral, no other nodes in neck and no neck swelling.Thyroid normal.   Carotid pulses normal, no carotid bruits.  Lungs : RR normal. Clear to auscultation anterior, posterior and lateral. No rales, wheezes rhonchi  or rubs. Good air exchange.  Heart: RRR. No Murmur, gallop, click or rub.    Extremities: no upper extremity edema. No lower extremity edema.   Musculoskeletal: no synovitis of joints seen. No new deformity.  Neuro: CN 2-12 intact. Alert, appropriate.   Ambulates independently.  No gross motor deficit.   No tremors.  Psych: normal mood and affect.  Skin: No rash, bruising petechiae or jaundice.      Scribe Transcription:  She states her bp was 192/68 last night and she had difficulty swallowing.     She has an issue with ceclor but not augmentin. She gets palpitations with ceclor.    She had a shot in her eye for her retinopathy last month in her left eye.    She denies abdominal pain, wheezing, and SOB. She denies feeling like she couldn’t inhale.      Assessment/Plan   Problem List Items Addressed This Visit          Endocrine/Metabolic    Moderate nonproliferative diabetic retinopathy of both eyes without macular edema associated with type 2 diabetes mellitus (CMS/[facility])    Type 2 diabetes mellitus with insulin therapy (CMS/[facility])    Relevant Medications    amoxicillin-pot clavulanate (Augmentin) 875-125 mg tablet     Other Visit Diagnoses       Strep pharyngitis    -  Primary    Relevant Medications    amoxicillin-pot clavulanate (Augmentin) 875-125 mg tablet    Sore throat        Relevant Medications    amoxicillin-pot clavulanate (Augmentin) 875-125 mg tablet    Other Relevant Orders    POCT Rapid Strep A manually resulted (Completed)    Acute pharyngitis, unspecified etiology        Relevant Medications    amoxicillin-pot clavulanate (Augmentin) 875-125 mg tablet    Fever in other diseases              Rapid strep was positive.

## 2023-04-05 NOTE — PROGRESS NOTES
Will send in augmentin now but need to see pt. Her most recent gfr is 41 and per lexicomp and med that is ok for regular dose of augmentin

## 2023-04-05 NOTE — PATIENT INSTRUCTIONS
Augmentin/amoxicillin-clavulanate one tab every 12 hours with food for 10 days.    Cool or cold liquids.  Stay hydrated  Tylenol for fever.  ER if you feel like you cannot swallow your own saliva but do not expect that to happen.    Keep regular appt.

## 2023-04-05 NOTE — TELEPHONE ENCOUNTER
"Pt calls in. She has a sore throat, throat swelling and trouble swallowing since yesterday. \"My epiglottis is swollen as well\" (I think she is referring to her uvula? Her temp is 100.0 orally last night, now 98.0 BP last night was 192/81,  This am 165/82. Covid test negative yesterday. No h/a, cough, SOB, wheezing. Just having really sore throat and thinks that it is strep.   "

## 2023-04-08 PROBLEM — R50.9 FEVER: Status: ACTIVE | Noted: 2023-04-08

## 2023-04-08 PROBLEM — J02.9 ACUTE PHARYNGITIS: Status: ACTIVE | Noted: 2023-04-08

## 2023-04-08 PROBLEM — J02.0 STREP PHARYNGITIS: Status: ACTIVE | Noted: 2023-04-08

## 2023-05-19 ENCOUNTER — LAB (OUTPATIENT)
Dept: LAB | Facility: LAB | Age: 78
End: 2023-05-19
Payer: MEDICARE

## 2023-05-19 DIAGNOSIS — Z79.4 TYPE 2 DIABETES MELLITUS WITH HYPOGLYCEMIA WITHOUT COMA, WITH LONG-TERM CURRENT USE OF INSULIN (MULTI): ICD-10-CM

## 2023-05-19 DIAGNOSIS — I25.10 CORONARY ARTERY DISEASE INVOLVING NATIVE CORONARY ARTERY OF NATIVE HEART WITHOUT ANGINA PECTORIS: ICD-10-CM

## 2023-05-19 DIAGNOSIS — I10 PRIMARY HYPERTENSION: ICD-10-CM

## 2023-05-19 DIAGNOSIS — E11.21 DIABETIC NEPHROPATHY ASSOCIATED WITH TYPE 2 DIABETES MELLITUS (MULTI): Chronic | ICD-10-CM

## 2023-05-19 DIAGNOSIS — E11.649 TYPE 2 DIABETES MELLITUS WITH HYPOGLYCEMIA WITHOUT COMA, WITH LONG-TERM CURRENT USE OF INSULIN (MULTI): ICD-10-CM

## 2023-05-19 LAB
ALANINE AMINOTRANSFERASE (SGPT) (U/L) IN SER/PLAS: 29 U/L (ref 7–45)
ALBUMIN (G/DL) IN SER/PLAS: 4.2 G/DL (ref 3.4–5)
ALBUMIN (MG/L) IN URINE: <7 MG/L
ALBUMIN/CREATININE (UG/MG) IN URINE: NORMAL UG/MG CRT (ref 0–30)
ALKALINE PHOSPHATASE (U/L) IN SER/PLAS: 114 U/L (ref 33–136)
ANION GAP IN SER/PLAS: 11 MMOL/L (ref 10–20)
ASPARTATE AMINOTRANSFERASE (SGOT) (U/L) IN SER/PLAS: 24 U/L (ref 9–39)
BILIRUBIN TOTAL (MG/DL) IN SER/PLAS: 0.9 MG/DL (ref 0–1.2)
CALCIUM (MG/DL) IN SER/PLAS: 9.9 MG/DL (ref 8.6–10.6)
CARBON DIOXIDE, TOTAL (MMOL/L) IN SER/PLAS: 31 MMOL/L (ref 21–32)
CHLORIDE (MMOL/L) IN SER/PLAS: 102 MMOL/L (ref 98–107)
CHOLESTEROL (MG/DL) IN SER/PLAS: 132 MG/DL (ref 0–199)
CHOLESTEROL IN HDL (MG/DL) IN SER/PLAS: 37.9 MG/DL
CHOLESTEROL/HDL RATIO: 3.5
COBALAMIN (VITAMIN B12) (PG/ML) IN SER/PLAS: 770 PG/ML (ref 211–911)
CREATININE (MG/DL) IN SER/PLAS: 1.29 MG/DL (ref 0.5–1.05)
CREATININE (MG/DL) IN URINE: 32.1 MG/DL (ref 20–320)
GFR FEMALE: 43 ML/MIN/1.73M2
GLUCOSE (MG/DL) IN SER/PLAS: 305 MG/DL (ref 74–99)
LDL: 57 MG/DL (ref 0–99)
POTASSIUM (MMOL/L) IN SER/PLAS: 4.4 MMOL/L (ref 3.5–5.3)
PROTEIN TOTAL: 6.7 G/DL (ref 6.4–8.2)
SODIUM (MMOL/L) IN SER/PLAS: 140 MMOL/L (ref 136–145)
THYROTROPIN (MIU/L) IN SER/PLAS BY DETECTION LIMIT <= 0.05 MIU/L: 1.48 MIU/L (ref 0.44–3.98)
TRIGLYCERIDE (MG/DL) IN SER/PLAS: 184 MG/DL (ref 0–149)
UREA NITROGEN (MG/DL) IN SER/PLAS: 24 MG/DL (ref 6–23)
VLDL: 37 MG/DL (ref 0–40)

## 2023-05-19 PROCEDURE — 80053 COMPREHEN METABOLIC PANEL: CPT

## 2023-05-19 PROCEDURE — 82043 UR ALBUMIN QUANTITATIVE: CPT

## 2023-05-19 PROCEDURE — 82570 ASSAY OF URINE CREATININE: CPT

## 2023-05-19 PROCEDURE — 82607 VITAMIN B-12: CPT

## 2023-05-19 PROCEDURE — 36415 COLL VENOUS BLD VENIPUNCTURE: CPT

## 2023-05-19 PROCEDURE — 80061 LIPID PANEL: CPT

## 2023-05-19 PROCEDURE — 84443 ASSAY THYROID STIM HORMONE: CPT

## 2023-05-31 ENCOUNTER — TELEPHONE (OUTPATIENT)
Dept: PRIMARY CARE | Facility: CLINIC | Age: 78
End: 2023-05-31
Payer: MEDICARE

## 2023-05-31 NOTE — TELEPHONE ENCOUNTER
I called the pt to remind her of her upcoming 6/7/23 appoint for her [facility] wellness visit and requested she bring her living will and DPOAH to have scanned into her chart.

## 2023-06-07 ENCOUNTER — OFFICE VISIT (OUTPATIENT)
Dept: PRIMARY CARE | Facility: CLINIC | Age: 78
End: 2023-06-07
Payer: MEDICARE

## 2023-06-07 VITALS
HEIGHT: 60 IN | RESPIRATION RATE: 18 BRPM | BODY MASS INDEX: 31.02 KG/M2 | WEIGHT: 158 LBS | DIASTOLIC BLOOD PRESSURE: 66 MMHG | HEART RATE: 64 BPM | SYSTOLIC BLOOD PRESSURE: 162 MMHG

## 2023-06-07 DIAGNOSIS — Z00.01 ANNUAL VISIT FOR GENERAL ADULT MEDICAL EXAMINATION WITH ABNORMAL FINDINGS: ICD-10-CM

## 2023-06-07 DIAGNOSIS — D89.89 KAPPA LIGHT CHAIN DISEASE (MULTI): ICD-10-CM

## 2023-06-07 DIAGNOSIS — R42 VERTIGO: ICD-10-CM

## 2023-06-07 DIAGNOSIS — I25.10 CORONARY ARTERY DISEASE INVOLVING NATIVE CORONARY ARTERY OF NATIVE HEART WITHOUT ANGINA PECTORIS: ICD-10-CM

## 2023-06-07 DIAGNOSIS — E11.649 TYPE 2 DIABETES MELLITUS WITH HYPOGLYCEMIA WITHOUT COMA, WITH LONG-TERM CURRENT USE OF INSULIN (MULTI): ICD-10-CM

## 2023-06-07 DIAGNOSIS — D47.2 MGUS (MONOCLONAL GAMMOPATHY OF UNKNOWN SIGNIFICANCE): ICD-10-CM

## 2023-06-07 DIAGNOSIS — M17.11 PRIMARY OSTEOARTHRITIS OF RIGHT KNEE: ICD-10-CM

## 2023-06-07 DIAGNOSIS — N18.32 STAGE 3B CHRONIC KIDNEY DISEASE (MULTI): ICD-10-CM

## 2023-06-07 DIAGNOSIS — Z13.89 ENCOUNTER FOR SCREENING FOR OTHER DISORDER: ICD-10-CM

## 2023-06-07 DIAGNOSIS — Z79.4 TYPE 2 DIABETES MELLITUS WITH INSULIN THERAPY (MULTI): ICD-10-CM

## 2023-06-07 DIAGNOSIS — I70.1 RENAL ARTERY STENOSIS (CMS-HCC): ICD-10-CM

## 2023-06-07 DIAGNOSIS — Z79.4 TYPE 2 DIABETES MELLITUS WITH HYPOGLYCEMIA WITHOUT COMA, WITH LONG-TERM CURRENT USE OF INSULIN (MULTI): ICD-10-CM

## 2023-06-07 DIAGNOSIS — I10 PRIMARY HYPERTENSION: ICD-10-CM

## 2023-06-07 DIAGNOSIS — R93.1 AGATSTON CAC SCORE, >400: Primary | ICD-10-CM

## 2023-06-07 DIAGNOSIS — E11.21 DIABETIC NEPHROPATHY ASSOCIATED WITH TYPE 2 DIABETES MELLITUS (MULTI): Chronic | ICD-10-CM

## 2023-06-07 DIAGNOSIS — E11.9 TYPE 2 DIABETES MELLITUS WITH INSULIN THERAPY (MULTI): ICD-10-CM

## 2023-06-07 DIAGNOSIS — Z78.9 FULL CODE STATUS: ICD-10-CM

## 2023-06-07 DIAGNOSIS — Z98.890 HISTORY OF STENT INSERTION OF RENAL ARTERY: ICD-10-CM

## 2023-06-07 DIAGNOSIS — E11.3393 MODERATE NONPROLIFERATIVE DIABETIC RETINOPATHY OF BOTH EYES WITHOUT MACULAR EDEMA ASSOCIATED WITH TYPE 2 DIABETES MELLITUS (MULTI): ICD-10-CM

## 2023-06-07 DIAGNOSIS — Z00.00 ENCOUNTER FOR SUBSEQUENT ANNUAL WELLNESS VISIT (AWV) IN MEDICARE PATIENT: ICD-10-CM

## 2023-06-07 PROBLEM — E66.9 OBESITY, CLASS I, BMI 30-34.9: Status: ACTIVE | Noted: 2020-07-05

## 2023-06-07 PROBLEM — E66.811 OBESITY, CLASS I, BMI 30-34.9: Status: ACTIVE | Noted: 2020-07-05

## 2023-06-07 PROBLEM — N25.81 HYPERPARATHYROIDISM DUE TO RENAL INSUFFICIENCY (MULTI): Status: ACTIVE | Noted: 2023-06-07

## 2023-06-07 PROBLEM — J02.0 STREP PHARYNGITIS: Status: RESOLVED | Noted: 2023-04-08 | Resolved: 2023-06-07

## 2023-06-07 PROBLEM — R50.9 FEVER: Status: RESOLVED | Noted: 2023-04-08 | Resolved: 2023-06-07

## 2023-06-07 LAB — POC HEMOGLOBIN A1C: 8.4 % (ref 4.2–6.5)

## 2023-06-07 PROCEDURE — 99213 OFFICE O/P EST LOW 20 MIN: CPT | Performed by: INTERNAL MEDICINE

## 2023-06-07 PROCEDURE — 1170F FXNL STATUS ASSESSED: CPT | Performed by: INTERNAL MEDICINE

## 2023-06-07 PROCEDURE — 1123F ACP DISCUSS/DSCN MKR DOCD: CPT | Performed by: INTERNAL MEDICINE

## 2023-06-07 PROCEDURE — G0439 PPPS, SUBSEQ VISIT: HCPCS | Performed by: INTERNAL MEDICINE

## 2023-06-07 PROCEDURE — 3078F DIAST BP <80 MM HG: CPT | Performed by: INTERNAL MEDICINE

## 2023-06-07 PROCEDURE — 3077F SYST BP >= 140 MM HG: CPT | Performed by: INTERNAL MEDICINE

## 2023-06-07 PROCEDURE — 1160F RVW MEDS BY RX/DR IN RCRD: CPT | Performed by: INTERNAL MEDICINE

## 2023-06-07 PROCEDURE — 99397 PER PM REEVAL EST PAT 65+ YR: CPT | Performed by: INTERNAL MEDICINE

## 2023-06-07 PROCEDURE — G0444 DEPRESSION SCREEN ANNUAL: HCPCS | Performed by: INTERNAL MEDICINE

## 2023-06-07 PROCEDURE — 1036F TOBACCO NON-USER: CPT | Performed by: INTERNAL MEDICINE

## 2023-06-07 PROCEDURE — 83036 HEMOGLOBIN GLYCOSYLATED A1C: CPT | Performed by: INTERNAL MEDICINE

## 2023-06-07 PROCEDURE — 1159F MED LIST DOCD IN RCRD: CPT | Performed by: INTERNAL MEDICINE

## 2023-06-07 RX ORDER — AMLODIPINE BESYLATE 5 MG/1
5 TABLET ORAL EVERY 24 HOURS
COMMUNITY
End: 2023-10-15

## 2023-06-07 RX ORDER — MECLIZINE HCL 12.5 MG 12.5 MG/1
12.5 TABLET ORAL EVERY 24 HOURS
COMMUNITY

## 2023-06-07 RX ORDER — MECLIZINE HCL 12.5 MG 12.5 MG/1
12.5-25 TABLET ORAL ONCE
Qty: 30 TABLET | Refills: 1 | Status: SHIPPED | OUTPATIENT
Start: 2023-06-07 | End: 2023-06-07

## 2023-06-07 ASSESSMENT — PATIENT HEALTH QUESTIONNAIRE - PHQ9
SUM OF ALL RESPONSES TO PHQ9 QUESTIONS 1 AND 2: 0
2. FEELING DOWN, DEPRESSED OR HOPELESS: NOT AT ALL
1. LITTLE INTEREST OR PLEASURE IN DOING THINGS: NOT AT ALL

## 2023-06-07 ASSESSMENT — ANXIETY QUESTIONNAIRES
1. FEELING NERVOUS, ANXIOUS, OR ON EDGE: NOT AT ALL
GAD7 TOTAL SCORE: 0
4. TROUBLE RELAXING: NOT AT ALL
6. BECOMING EASILY ANNOYED OR IRRITABLE: NOT AT ALL
2. NOT BEING ABLE TO STOP OR CONTROL WORRYING: NOT AT ALL
IF YOU CHECKED OFF ANY PROBLEMS ON THIS QUESTIONNAIRE, HOW DIFFICULT HAVE THESE PROBLEMS MADE IT FOR YOU TO DO YOUR WORK, TAKE CARE OF THINGS AT HOME, OR GET ALONG WITH OTHER PEOPLE: NOT DIFFICULT AT ALL
3. WORRYING TOO MUCH ABOUT DIFFERENT THINGS: NOT AT ALL
5. BEING SO RESTLESS THAT IT IS HARD TO SIT STILL: NOT AT ALL
7. FEELING AFRAID AS IF SOMETHING AWFUL MIGHT HAPPEN: NOT AT ALL

## 2023-06-07 ASSESSMENT — ACTIVITIES OF DAILY LIVING (ADL)
WALKS IN HOME: INDEPENDENT
HEARING - LEFT EAR: FUNCTIONAL
NEEDS ASSISTANCE WITH FOOD: INDEPENDENT
GROCERY SHOPPING: INDEPENDENT
PREPARING MEALS: INDEPENDENT
MANAGING FINANCES: INDEPENDENT
TOILETING: INDEPENDENT
FEEDING YOURSELF: INDEPENDENT
EATING: INDEPENDENT
BATHING: INDEPENDENT
USING TELEPHONE: INDEPENDENT
ADEQUATE_TO_COMPLETE_ADL: YES
USING TRANSPORTATION: INDEPENDENT
JUDGMENT_ADEQUATE_SAFELY_COMPLETE_DAILY_ACTIVITIES: YES
HEARING - RIGHT EAR: FUNCTIONAL
GROOMING: INDEPENDENT
DOING HOUSEWORK: INDEPENDENT
DRESSING YOURSELF: INDEPENDENT
STIL DRIVING: YES
PILL BOX USED: YES
TAKING MEDICATION: INDEPENDENT
PATIENT'S MEMORY ADEQUATE TO SAFELY COMPLETE DAILY ACTIVITIES?: YES

## 2023-06-07 ASSESSMENT — PAIN SCALES - GENERAL: PAINLEVEL: 0-NO PAIN

## 2023-06-07 ASSESSMENT — ENCOUNTER SYMPTOMS
LOSS OF SENSATION IN FEET: 0
DEPRESSION: 0
OCCASIONAL FEELINGS OF UNSTEADINESS: 0

## 2023-06-07 ASSESSMENT — COLUMBIA-SUICIDE SEVERITY RATING SCALE - C-SSRS
6. HAVE YOU EVER DONE ANYTHING, STARTED TO DO ANYTHING, OR PREPARED TO DO ANYTHING TO END YOUR LIFE?: NO
1. IN THE PAST MONTH, HAVE YOU WISHED YOU WERE DEAD OR WISHED YOU COULD GO TO SLEEP AND NOT WAKE UP?: NO
2. HAVE YOU ACTUALLY HAD ANY THOUGHTS OF KILLING YOURSELF?: NO

## 2023-06-07 NOTE — PATIENT INSTRUCTIONS
Thank ou for coming in for your annual wellness visit.  We are making a referral to the [facility] program to look for thorough assessment of diabetes, kidney disease, heart and see about additional medications that can help protect your kidneys and heart going forward. They should call you.  Keep your last appt. With Dr Shelley, and then we can manage diabetes here for now.    We talked about adding medications such as farxiga or jardiance (pill) or Trulicity or Ozempic. Will discuss next visit.    Pelvic to check urethral caruncle next visit.  Follow up 3months.

## 2023-06-07 NOTE — PROGRESS NOTES
Subjective   Reason for Visit: Eleonora Varela is an 77 y.o. female here for a Medicare Wellness visit. , annual pe an dfollow up    Past Medical, Surgical, and Family History reviewed and updated in chart.         HPI    Health maintenance items last completed:  Colonoscopy: 8-2020  Mammogram:   Bone Density: 4-2022  Urine albumin if HTN or DM2:   Pap: REMOTE, AGED OUT  Pelvic:November 2020 TINY CARUNCLE NO CHANGE  Breast exam in office:TODAY  Vaccines due or not completed: utd tetanus 2017  Last Health Maintenance exam:      Pts provider team:  [facility] pcp  [facility] julieth Palacios nephrology  Nekl was endo but retiring-she will come here for diabetes  Eye dr ambrosio  Retina dr vimal sun is vascular doc for renal stent.  Dr pierre cardiologist.       Discussed current recommendations for covid vaccines, cases are currently low but not non-existent.    Scribe Transcription:  She has a bruise in her left exterior upper arm.     Diabetes:Type II:Is taking medications regularly, denies side effects.  Denies hypoglycemia, polyuria, polydipsia.  No new numbness or paresthesias of extremities.No syncope or dizziness.No blurred vision.  Lab Results   Component Value Date    HGBA1C 8.4 (A) 06/07/2023    HGBA1C 8.2 (A) 03/07/2023    HGBA1C 7.4 (A) 11/15/2021     Lab Results   Component Value Date    CREATININE 1.29 (H) 05/19/2023         Lab Results   Component Value Date    GLUCOSE 305 (H) 05/19/2023    CALCIUM 9.9 05/19/2023     05/19/2023    K 4.4 05/19/2023    CO2 31 05/19/2023     05/19/2023    BUN 24 (H) 05/19/2023    CREATININE 1.29 (H) 05/19/2023           We had a discussion about SGLT-2 inhibitors and GLP-1 RA risks vs benefits for her. Recommend she get on at least sglt2i with her ckd and A1c not controllled. Could consider glp1 in addition  Will need to watchK  Past issue of elevated k at 7 and admit with jim de leon -not on any RAAS since then and concernfor this with her ckd.  She  had renal artery stenosis that was stented last year. K has been normal. Could consider restarting RAAS. She is afraid to do so. Would need weekly monitoring of K.    She denies covid since her last visit. She has had a dermatology eval since her last visit. She had an injection in her left eye for diabetic retinopathy and it is improving. Previously her retinopathy had resolved but now it is back.    We discussed her advanced directives. Full code, explained lw vs dnr order.    ROS reviewed:She states her urinary urgency is not new.       Review of Systems  All systems reviewed and are negative unless otherwise stated in HPI.   Review of systems, written document, scanned in to office visit.  I reviewed 7 page history and review of systems form.  Only pos is chronic urinary urgency.  Objective   Lab Results   Component Value Date    WBC 6.6 03/27/2023    HGB 14.4 03/27/2023    HCT 43.3 03/27/2023     03/27/2023    CHOL 132 05/19/2023    TRIG 184 (H) 05/19/2023    HDL 37.9 (A) 05/19/2023    ALT 29 05/19/2023    AST 24 05/19/2023     05/19/2023    K 4.4 05/19/2023     05/19/2023    CREATININE 1.29 (H) 05/19/2023    BUN 24 (H) 05/19/2023    CO2 31 05/19/2023    TSH 1.48 05/19/2023    INR 0.9 05/09/2022    HGBA1C 8.4 (A) 06/07/2023     Lab Results   Component Value Date    ERZITHYI65 770 05/19/2023     Lab Results   Component Value Date    WBC 6.6 03/27/2023    HGB 14.4 03/27/2023    HCT 43.3 03/27/2023    MCV 90 03/27/2023     03/27/2023       Glu 305, she is aware, creat stable at 1.29, other labs stable.      Vitals:  /66 (BP Location: Left arm, Patient Position: Sitting, BP Cuff Size: Large adult)   Pulse 64   Resp 18   Ht 1.524 m (5')   Wt 71.7 kg (158 lb)   BMI 30.86 kg/m²         11/29/2022     8:45 AM 12/5/2022     3:32 PM 12/5/2022     4:56 PM 2/6/2023    10:16 AM 3/7/2023     8:51 AM 4/5/2023     5:10 PM 6/7/2023     5:02 PM   Vitals   Systolic 128 158 142 160 132 140 162    Diastolic 71 64 78 80 54 58 66   Heart Rate 75 65  74 64 77 64   Temp    36.1 °C (96.9 °F)  37.6 °C (99.7 °F)    Resp  18   18 18 18   Height (in)  1.524 m (5')  1.524 m (5')  1.524 m (5') 1.524 m (5')   Weight (lb) 168.5 169.8  167 167 158 158   BMI 32.91 kg/m2 33.16 kg/m2  32.61 kg/m2 32.61 kg/m2 30.86 kg/m2 30.86 kg/m2   BSA (m2) 1.8 m2 1.81 m2  1.79 m2 1.79 m2 1.74 m2 1.74 m2   Visit Report     Report Report Report         Physical Exam  General: Patient is known to me, looks well, is in usual state of health, with  no obvious distress.  Head: normocephalic  Eyes: PERRL, EOMI, no scleral icterus or conjunctival abnormality  Ears:Normal canals and TM's  Oropharynx: Well hydrated mucosa. no lesions, erythema. palate moves well.  Neck: No masses, no cervical (A/P/ or Lateral) adenopathy. Thyroid not enlarged and no nodules. Carotid pulses normal and no bruits.  Chest: Normal AP diameter. No supraclavicular adenopathy.  Lungs: Clear to auscultation: no rales , wheezes, rhonchi, rubs, examined bilaterally, ant/post /lateral. RR normal.  Heart: RRR. No GCR S1 S2 normal. 1/6 systolic murmur.unchanged.  Abd: BS normal, no bruits. No hepatosplenomegaly. No abdominal mass or tenderness. No distension.  Extremities: No upper extremity edema. No lower leg edema.No ischemia.   Joints: no synovitis seen. No deformities.  Pulses: normal posterior tibialis pulses, normal dorsalis pedis pulses. normal radial pulses. Normal femoral pulses without bruits.  Neuro: CN 2-12 intact . Alert, oriented, appropriate.  No focal weakness observed. No tremors. Ambulation is normal .  Psych: Mood and affect normal. No cognitive deficits noted during this exam. Alert, oriented, appropriate.  Skin: No rash, petechiae or excessive bruising.  Lymph: no SC axillary or inguinal adenopathy  Breast Exam : Symmetric appearance. No masses, nipple discharge, skin retraction, axillary or supraclavicular adenopathy.          Eleonora was seen today for  medicare annual wellness visit subsequent.  Diagnoses and all orders for this visit:  Diabetic nephropathy associated with type 2 diabetes mellitus (CMS/[facility])  -     Follow Up In [facility] - PCP  Stage 3b chronic kidney disease  -     Follow Up In [facility] - PCP  Coronary artery disease involving native coronary artery of native heart without angina pectoris  -     Follow Up In [facility] - PCP  Primary hypertension  -     Follow Up In [facility] - PCP  Renal artery stenosis (CMS/[facility])  -     Follow Up In [facility] - PCP  Moderate nonproliferative diabetic retinopathy of both eyes without macular edema associated with type 2 diabetes mellitus (CMS/[facility])  -     Follow Up In [facility] - PCP  Type 2 diabetes mellitus with hypoglycemia without coma, with long-term current use of insulin (CMS/[facility])  -     Follow Up In [facility] - PCP  -     POCT glycosylated hemoglobin (Hb A1C) manually resulted  Type 2 diabetes mellitus with insulin therapy (CMS/[facility])  -     Follow Up In [facility] - PCP  -     POCT glycosylated hemoglobin (Hb A1C) manually resulted  Primary osteoarthritis of right knee  -     Follow Up In [facility] - PCP  Madill light chain disease (CMS/[facility])  -     Follow Up In [facility] - PCP  MGUS (monoclonal gammopathy of unknown significance)  -     Follow Up In [facility] - PCP  History of stent insertion of renal artery  -     Follow Up In [facility] - PCP  Encounter for subsequent annual wellness visit (AWV) in Medicare patient  -     Follow Up In [facility] - PCP  Annual visit for general adult medical examination with abnormal findings  -     Follow Up In [facility] - PCP  Vertigo   better  Discussed [facility] multidisciplinary approach to pts with dm2 ckd vasc dis and or ascad. She has mulitple reasons to participate. Referral made  Asked her to discuss  sglt2i and glp 1 with dr adler as she sees him next week for her final endo visit. Gave her names of meds. She did not want to add anything now, we can discuss next vsit-will see how soon she can get into [facility] as they may make a recommendation.  I would likely add the sglt2i first. Watch sugars closely  Discuss with nephro if we could trial adding back ace/arb now that renal artery has been stented, K avg is now around 4-4.4, once was 5.1 and once 3.8.     Annual  history and physical completed including  ROS, PE.   Medicare wellness visit  completed including:  Immunizations,   Health Maintenance items,  Discussion of advanced directives and code status, which is: full code  Fall risk and prevention addressed.  Functionality and pain were assessed.   Cancer screening testing was addressed as appropriate-see patient discussion/orders.   Medications were discussed and reviewed/reconciled and refill need assessed/handled.   Patient states taking their medication regularly and appropriately.  Also:   Depression screening PhQ-2 completed: neg  Alcohol misuse screen:neg    In addition to the wellness visit and screening, the above medical issues were addressed:  As well as results of testing completed since last visit.

## 2023-06-14 RX ORDER — LORATADINE 10 MG
10 TABLET,DISINTEGRATING ORAL DAILY
COMMUNITY

## 2023-08-02 DIAGNOSIS — Z79.4 TYPE 2 DIABETES MELLITUS WITH INSULIN THERAPY (MULTI): Primary | ICD-10-CM

## 2023-08-02 DIAGNOSIS — E11.9 TYPE 2 DIABETES MELLITUS WITH INSULIN THERAPY (MULTI): Primary | ICD-10-CM

## 2023-08-02 DIAGNOSIS — E11.9 TYPE 2 DIABETES MELLITUS WITH INSULIN THERAPY (MULTI): ICD-10-CM

## 2023-08-02 DIAGNOSIS — Z79.4 TYPE 2 DIABETES MELLITUS WITH INSULIN THERAPY (MULTI): ICD-10-CM

## 2023-08-02 RX ORDER — INSULIN GLARGINE 100 [IU]/ML
18 INJECTION, SOLUTION SUBCUTANEOUS NIGHTLY
Qty: 10 ML | OUTPATIENT
Start: 2023-08-02

## 2023-08-02 RX ORDER — INSULIN GLARGINE 100 [IU]/ML
18 INJECTION, SOLUTION SUBCUTANEOUS NIGHTLY
Qty: 10 ML | Refills: 6 | Status: SHIPPED | OUTPATIENT
Start: 2023-08-02 | End: 2023-08-09 | Stop reason: ENTERED-IN-ERROR

## 2023-08-02 RX ORDER — INSULIN LISPRO 100 [IU]/ML
75 INJECTION, SOLUTION INTRAVENOUS; SUBCUTANEOUS
OUTPATIENT
Start: 2023-08-02

## 2023-08-03 DIAGNOSIS — E11.9 TYPE 2 DIABETES MELLITUS WITH INSULIN THERAPY (MULTI): ICD-10-CM

## 2023-08-03 DIAGNOSIS — Z79.4 TYPE 2 DIABETES MELLITUS WITH INSULIN THERAPY (MULTI): ICD-10-CM

## 2023-08-03 RX ORDER — INSULIN GLARGINE 100 [IU]/ML
18 INJECTION, SOLUTION SUBCUTANEOUS NIGHTLY
Qty: 15 ML | Refills: 3 | Status: CANCELLED | OUTPATIENT
Start: 2023-08-03

## 2023-08-03 NOTE — TELEPHONE ENCOUNTER
PT stopped in for a refill issue.  She needs her rx to be the pre-filled pens. This is for her Lantus Solostar 110 units/ml Five 3ml  She would like it called in to [facility] or [facility], whichever is cheaper.   Eleonora [phone]  [facility] [place] [phone]  [facility] [phone]  I will call her and let her know which one it is called in to

## 2023-08-08 DIAGNOSIS — Z79.4 TYPE 2 DIABETES MELLITUS WITH INSULIN THERAPY (MULTI): ICD-10-CM

## 2023-08-08 DIAGNOSIS — E11.9 TYPE 2 DIABETES MELLITUS WITH INSULIN THERAPY (MULTI): ICD-10-CM

## 2023-08-09 DIAGNOSIS — E11.9 TYPE 2 DIABETES MELLITUS WITH INSULIN THERAPY (MULTI): Primary | ICD-10-CM

## 2023-08-09 DIAGNOSIS — E11.649 TYPE 2 DIABETES MELLITUS WITH HYPOGLYCEMIA WITHOUT COMA, WITH LONG-TERM CURRENT USE OF INSULIN (MULTI): ICD-10-CM

## 2023-08-09 DIAGNOSIS — Z79.4 TYPE 2 DIABETES MELLITUS WITH HYPOGLYCEMIA WITHOUT COMA, WITH LONG-TERM CURRENT USE OF INSULIN (MULTI): ICD-10-CM

## 2023-08-09 DIAGNOSIS — Z79.4 TYPE 2 DIABETES MELLITUS WITH INSULIN THERAPY (MULTI): Primary | ICD-10-CM

## 2023-08-09 RX ORDER — INSULIN GLARGINE 100 [IU]/ML
20 INJECTION, SOLUTION SUBCUTANEOUS NIGHTLY
Qty: 20 ML | Refills: 3 | Status: SHIPPED | OUTPATIENT
Start: 2023-08-09 | End: 2023-08-16 | Stop reason: SDUPTHER

## 2023-08-09 RX ORDER — INSULIN LISPRO 100 [IU]/ML
INJECTION, SOLUTION INTRAVENOUS; SUBCUTANEOUS
Qty: 72 EACH | Refills: 3 | Status: SHIPPED | OUTPATIENT
Start: 2023-08-09 | End: 2023-12-29 | Stop reason: WASHOUT

## 2023-08-09 RX ORDER — INSULIN GLARGINE 100 [IU]/ML
INJECTION, SOLUTION SUBCUTANEOUS
Qty: 30 ML | Refills: 3 | OUTPATIENT
Start: 2023-08-09

## 2023-08-09 RX ORDER — INSULIN LISPRO 100 [IU]/ML
INJECTION, SOLUTION INTRAVENOUS; SUBCUTANEOUS
Refills: 0 | OUTPATIENT
Start: 2023-08-09

## 2023-08-09 RX ORDER — INSULIN GLARGINE 100 [IU]/ML
INJECTION, SOLUTION SUBCUTANEOUS
Refills: 0 | OUTPATIENT
Start: 2023-08-09

## 2023-08-15 DIAGNOSIS — Z79.4 TYPE 2 DIABETES MELLITUS WITH INSULIN THERAPY (MULTI): ICD-10-CM

## 2023-08-15 DIAGNOSIS — E11.9 TYPE 2 DIABETES MELLITUS WITH INSULIN THERAPY (MULTI): ICD-10-CM

## 2023-08-15 RX ORDER — INSULIN GLARGINE 100 [IU]/ML
INJECTION, SOLUTION SUBCUTANEOUS
Refills: 0 | OUTPATIENT
Start: 2023-08-15

## 2023-08-16 DIAGNOSIS — E11.9 TYPE 2 DIABETES MELLITUS WITH INSULIN THERAPY (MULTI): ICD-10-CM

## 2023-08-16 DIAGNOSIS — Z79.4 TYPE 2 DIABETES MELLITUS WITH INSULIN THERAPY (MULTI): ICD-10-CM

## 2023-08-16 RX ORDER — FLASH GLUCOSE SENSOR
KIT MISCELLANEOUS
Qty: 1 EACH | Refills: 0 | Status: SHIPPED | OUTPATIENT
Start: 2023-08-16

## 2023-08-16 RX ORDER — INSULIN GLARGINE 100 [IU]/ML
20 INJECTION, SOLUTION SUBCUTANEOUS NIGHTLY
Qty: 30 ML | Refills: 1 | Status: SHIPPED | OUTPATIENT
Start: 2023-08-16 | End: 2024-03-04 | Stop reason: SDUPTHER

## 2023-08-16 NOTE — TELEPHONE ENCOUNTER
Pt needs refill on Lantus SoloStar Pre-filled pens. 100 units/ML.   [facility].   Pt # [phone]

## 2023-08-16 NOTE — TELEPHONE ENCOUNTER
Pt came in today. She said that the Rx is wrong. I looked aat the box and it is for 3ml prefilled pens and that is what is sent?? Can we send these over again with the sensor please

## 2023-09-04 PROBLEM — R07.9 CHEST PAIN: Status: ACTIVE | Noted: 2023-09-04

## 2023-09-04 PROBLEM — R07.9 RIGHT-SIDED CHEST PAIN: Status: ACTIVE | Noted: 2023-09-04

## 2023-09-04 PROBLEM — M25.461 PAIN AND SWELLING OF RIGHT KNEE: Status: ACTIVE | Noted: 2023-09-04

## 2023-09-04 PROBLEM — M85.852 OSTEOPENIA OF NECK OF LEFT FEMUR: Status: ACTIVE | Noted: 2023-09-04

## 2023-09-04 PROBLEM — R59.0 AXILLARY ADENOPATHY: Status: ACTIVE | Noted: 2023-09-04

## 2023-09-04 PROBLEM — M25.561 PAIN AND SWELLING OF RIGHT KNEE: Status: ACTIVE | Noted: 2023-09-04

## 2023-09-04 PROBLEM — R07.2 RETROSTERNAL CHEST PAIN: Status: ACTIVE | Noted: 2023-09-04

## 2023-09-04 PROBLEM — T30.0 BURN: Status: ACTIVE | Noted: 2023-09-04

## 2023-09-04 PROBLEM — E66.09 CLASS 1 OBESITY DUE TO EXCESS CALORIES WITH BODY MASS INDEX (BMI) OF 30.0 TO 30.9 IN ADULT: Status: ACTIVE | Noted: 2023-09-04

## 2023-09-04 PROBLEM — J04.0 LARYNGITIS: Status: ACTIVE | Noted: 2023-09-04

## 2023-09-04 PROBLEM — Z79.82 ASPIRIN LONG-TERM USE: Status: ACTIVE | Noted: 2023-09-04

## 2023-09-04 PROBLEM — E66.811 CLASS 1 OBESITY DUE TO EXCESS CALORIES WITH BODY MASS INDEX (BMI) OF 30.0 TO 30.9 IN ADULT: Status: ACTIVE | Noted: 2023-09-04

## 2023-09-04 RX ORDER — AMLODIPINE BESYLATE 10 MG/1
1 TABLET ORAL DAILY
COMMUNITY
End: 2023-12-18 | Stop reason: SDUPTHER

## 2023-09-04 RX ORDER — INSULIN HUMAN 100 [IU]/ML
20 INJECTION, SUSPENSION SUBCUTANEOUS NIGHTLY
COMMUNITY
Start: 2020-11-12 | End: 2023-10-02 | Stop reason: ALTCHOICE

## 2023-09-04 RX ORDER — HYDROCHLOROTHIAZIDE 25 MG/1
1 TABLET ORAL DAILY
COMMUNITY
Start: 2021-01-20 | End: 2023-10-15 | Stop reason: ALTCHOICE

## 2023-09-04 RX ORDER — INSULIN LISPRO 100 [IU]/ML
75 INJECTION, SUSPENSION SUBCUTANEOUS DAILY
COMMUNITY
End: 2023-10-24 | Stop reason: SDUPTHER

## 2023-09-04 RX ORDER — CLOBETASOL PROPIONATE 0.46 MG/ML
SOLUTION TOPICAL
COMMUNITY
Start: 2023-03-21 | End: 2023-12-05 | Stop reason: WASHOUT

## 2023-09-04 RX ORDER — ACETAMINOPHEN 500 MG
TABLET ORAL DAILY
COMMUNITY
Start: 2019-10-15 | End: 2023-10-02 | Stop reason: ALTCHOICE

## 2023-09-15 ENCOUNTER — OFFICE (OUTPATIENT)
Dept: URBAN - METROPOLITAN AREA CLINIC 26 | Facility: CLINIC | Age: 78
End: 2023-09-15

## 2023-09-15 VITALS
HEART RATE: 71 BPM | DIASTOLIC BLOOD PRESSURE: 84 MMHG | WEIGHT: 171 LBS | TEMPERATURE: 97.9 F | HEIGHT: 60 IN | SYSTOLIC BLOOD PRESSURE: 137 MMHG

## 2023-09-15 DIAGNOSIS — E11.9 TYPE 2 DIABETES MELLITUS WITHOUT COMPLICATIONS: ICD-10-CM

## 2023-09-15 DIAGNOSIS — N18.9 CHRONIC KIDNEY DISEASE, UNSPECIFIED: ICD-10-CM

## 2023-09-15 DIAGNOSIS — I10 ESSENTIAL (PRIMARY) HYPERTENSION: ICD-10-CM

## 2023-09-15 DIAGNOSIS — Z79.82 LONG TERM (CURRENT) USE OF ASPIRIN: ICD-10-CM

## 2023-09-15 DIAGNOSIS — Z09 ENCOUNTER FOR FOLLOW-UP EXAMINATION AFTER COMPLETED TREATMEN: ICD-10-CM

## 2023-09-15 DIAGNOSIS — Z86.010 PERSONAL HISTORY OF COLONIC POLYPS: ICD-10-CM

## 2023-09-15 PROCEDURE — 99203 OFFICE O/P NEW LOW 30 MIN: CPT | Performed by: NURSE PRACTITIONER

## 2023-09-15 RX ORDER — PROMETHAZINE HYDROCHLORIDE 25 MG/1
TABLET ORAL
Qty: 3 | Refills: 0 | Status: COMPLETED
Start: 2023-09-15 | End: 2023-09-25

## 2023-09-15 RX ORDER — POLYETHYLENE GLYCOL 3350, SODIUM SULFATE ANHYDROUS, SODIUM BICARBONATE, SODIUM CHLORIDE, POTASSIUM CHLORIDE 236; 22.74; 6.74; 5.86; 2.97 G/4L; G/4L; G/4L; G/4L; G/4L
POWDER, FOR SOLUTION ORAL
Qty: 4000 | Refills: 0 | Status: COMPLETED
Start: 2023-09-15 | End: 2023-09-25

## 2023-09-16 ENCOUNTER — LAB (OUTPATIENT)
Dept: LAB | Facility: LAB | Age: 78
End: 2023-09-16
Payer: MEDICARE

## 2023-09-16 LAB
ALANINE AMINOTRANSFERASE (SGPT) (U/L) IN SER/PLAS: 27 U/L (ref 7–45)
ALBUMIN (G/DL) IN SER/PLAS: 4.4 G/DL (ref 3.4–5)
ALBUMIN (MG/L) IN URINE: 119.4 MG/L
ALBUMIN/CREATININE (UG/MG) IN URINE: 82.9 UG/MG CRT (ref 0–30)
ALKALINE PHOSPHATASE (U/L) IN SER/PLAS: 97 U/L (ref 33–136)
ANION GAP IN SER/PLAS: 10 MMOL/L (ref 10–20)
ASPARTATE AMINOTRANSFERASE (SGOT) (U/L) IN SER/PLAS: 22 U/L (ref 9–39)
BILIRUBIN TOTAL (MG/DL) IN SER/PLAS: 1 MG/DL (ref 0–1.2)
CALCIUM (MG/DL) IN SER/PLAS: 9.6 MG/DL (ref 8.6–10.6)
CARBON DIOXIDE, TOTAL (MMOL/L) IN SER/PLAS: 33 MMOL/L (ref 21–32)
CHLORIDE (MMOL/L) IN SER/PLAS: 100 MMOL/L (ref 98–107)
CHOLESTEROL (MG/DL) IN SER/PLAS: 150 MG/DL (ref 0–199)
CHOLESTEROL IN HDL (MG/DL) IN SER/PLAS: 42.5 MG/DL
CHOLESTEROL/HDL RATIO: 3.5
CREATININE (MG/DL) IN SER/PLAS: 1.45 MG/DL (ref 0.5–1.05)
CREATININE (MG/DL) IN URINE: 144 MG/DL (ref 20–320)
ESTIMATED AVERAGE GLUCOSE FOR HBA1C: 197 MG/DL
GFR FEMALE: 37 ML/MIN/1.73M2
GLUCOSE (MG/DL) IN SER/PLAS: 252 MG/DL (ref 74–99)
HEMOGLOBIN A1C/HEMOGLOBIN TOTAL IN BLOOD: 8.5 %
LDL: 79 MG/DL (ref 0–99)
POTASSIUM (MMOL/L) IN SER/PLAS: 4.4 MMOL/L (ref 3.5–5.3)
PROTEIN TOTAL: 6.9 G/DL (ref 6.4–8.2)
SODIUM (MMOL/L) IN SER/PLAS: 139 MMOL/L (ref 136–145)
TRIGLYCERIDE (MG/DL) IN SER/PLAS: 143 MG/DL (ref 0–149)
UREA NITROGEN (MG/DL) IN SER/PLAS: 21 MG/DL (ref 6–23)
VLDL: 29 MG/DL (ref 0–40)

## 2023-09-17 LAB
IMMUNOGLOBULIN LIGHT CHAINS KAPPA/LAMBDA (MASS RATIO) IN SERUM: 1.27 (ref 0.26–1.65)
IMMUNOGLOBULIN LIGHT CHAINS.KAPPA (MG/DL) IN SERUM: 3.15 MG/DL (ref 0.33–1.94)
IMMUNOGLOBULIN LIGHT CHAINS.LAMBDA (MG/DL) IN SERUM: 2.49 MG/DL (ref 0.57–2.63)

## 2023-09-20 VITALS
OXYGEN SATURATION: 98 % | RESPIRATION RATE: 8 BRPM | HEART RATE: 62 BPM | RESPIRATION RATE: 9 BRPM | RESPIRATION RATE: 15 BRPM | DIASTOLIC BLOOD PRESSURE: 44 MMHG | RESPIRATION RATE: 15 BRPM | RESPIRATION RATE: 11 BRPM | SYSTOLIC BLOOD PRESSURE: 113 MMHG | SYSTOLIC BLOOD PRESSURE: 113 MMHG | HEART RATE: 64 BPM | WEIGHT: 170 LBS | DIASTOLIC BLOOD PRESSURE: 42 MMHG | OXYGEN SATURATION: 98 % | SYSTOLIC BLOOD PRESSURE: 102 MMHG | RESPIRATION RATE: 14 BRPM | DIASTOLIC BLOOD PRESSURE: 39 MMHG | OXYGEN SATURATION: 92 % | HEART RATE: 63 BPM | SYSTOLIC BLOOD PRESSURE: 159 MMHG | OXYGEN SATURATION: 92 % | TEMPERATURE: 98.1 F | HEIGHT: 60 IN | RESPIRATION RATE: 9 BRPM | OXYGEN SATURATION: 95 % | RESPIRATION RATE: 8 BRPM | DIASTOLIC BLOOD PRESSURE: 47 MMHG | OXYGEN SATURATION: 94 % | DIASTOLIC BLOOD PRESSURE: 47 MMHG | WEIGHT: 170 LBS | HEIGHT: 60 IN | DIASTOLIC BLOOD PRESSURE: 44 MMHG | SYSTOLIC BLOOD PRESSURE: 102 MMHG | DIASTOLIC BLOOD PRESSURE: 50 MMHG | SYSTOLIC BLOOD PRESSURE: 95 MMHG | DIASTOLIC BLOOD PRESSURE: 62 MMHG | SYSTOLIC BLOOD PRESSURE: 121 MMHG | HEART RATE: 63 BPM | OXYGEN SATURATION: 94 % | OXYGEN SATURATION: 93 % | RESPIRATION RATE: 11 BRPM | TEMPERATURE: 98.1 F | HEART RATE: 62 BPM | RESPIRATION RATE: 12 BRPM | HEART RATE: 67 BPM | HEART RATE: 56 BPM | WEIGHT: 170 LBS | RESPIRATION RATE: 14 BRPM | RESPIRATION RATE: 12 BRPM | SYSTOLIC BLOOD PRESSURE: 95 MMHG | SYSTOLIC BLOOD PRESSURE: 159 MMHG | HEART RATE: 56 BPM | RESPIRATION RATE: 11 BRPM | HEART RATE: 59 BPM | OXYGEN SATURATION: 92 % | SYSTOLIC BLOOD PRESSURE: 112 MMHG | SYSTOLIC BLOOD PRESSURE: 121 MMHG | SYSTOLIC BLOOD PRESSURE: 112 MMHG | HEART RATE: 67 BPM | RESPIRATION RATE: 15 BRPM | DIASTOLIC BLOOD PRESSURE: 50 MMHG | DIASTOLIC BLOOD PRESSURE: 42 MMHG | SYSTOLIC BLOOD PRESSURE: 102 MMHG | HEART RATE: 62 BPM | DIASTOLIC BLOOD PRESSURE: 39 MMHG | HEART RATE: 64 BPM | DIASTOLIC BLOOD PRESSURE: 47 MMHG | OXYGEN SATURATION: 98 % | OXYGEN SATURATION: 93 % | SYSTOLIC BLOOD PRESSURE: 159 MMHG | RESPIRATION RATE: 8 BRPM | DIASTOLIC BLOOD PRESSURE: 62 MMHG | DIASTOLIC BLOOD PRESSURE: 50 MMHG | DIASTOLIC BLOOD PRESSURE: 62 MMHG | RESPIRATION RATE: 12 BRPM | SYSTOLIC BLOOD PRESSURE: 95 MMHG | SYSTOLIC BLOOD PRESSURE: 112 MMHG | TEMPERATURE: 98.1 F | HEART RATE: 64 BPM | RESPIRATION RATE: 9 BRPM | DIASTOLIC BLOOD PRESSURE: 39 MMHG | HEART RATE: 59 BPM | RESPIRATION RATE: 14 BRPM | HEIGHT: 60 IN | OXYGEN SATURATION: 93 % | DIASTOLIC BLOOD PRESSURE: 44 MMHG | HEART RATE: 63 BPM | HEART RATE: 67 BPM | OXYGEN SATURATION: 94 % | SYSTOLIC BLOOD PRESSURE: 113 MMHG | OXYGEN SATURATION: 95 % | SYSTOLIC BLOOD PRESSURE: 121 MMHG | OXYGEN SATURATION: 95 % | HEART RATE: 56 BPM | HEART RATE: 59 BPM | DIASTOLIC BLOOD PRESSURE: 42 MMHG

## 2023-09-25 ENCOUNTER — AMBULATORY SURGICAL CENTER (OUTPATIENT)
Dept: URBAN - METROPOLITAN AREA SURGERY 12 | Facility: SURGERY | Age: 78
End: 2023-09-25
Payer: MEDICARE

## 2023-09-25 ENCOUNTER — AMBULATORY SURGICAL CENTER (OUTPATIENT)
Dept: URBAN - METROPOLITAN AREA SURGERY 12 | Facility: SURGERY | Age: 78
End: 2023-09-25

## 2023-09-25 ENCOUNTER — OFFICE (OUTPATIENT)
Dept: URBAN - METROPOLITAN AREA PATHOLOGY 2 | Facility: PATHOLOGY | Age: 78
End: 2023-09-25
Payer: MEDICARE

## 2023-09-25 DIAGNOSIS — K64.4 RESIDUAL HEMORRHOIDAL SKIN TAGS: ICD-10-CM

## 2023-09-25 DIAGNOSIS — K64.8 OTHER HEMORRHOIDS: ICD-10-CM

## 2023-09-25 DIAGNOSIS — D12.3 BENIGN NEOPLASM OF TRANSVERSE COLON: ICD-10-CM

## 2023-09-25 DIAGNOSIS — Z09 ENCOUNTER FOR FOLLOW-UP EXAMINATION AFTER COMPLETED TREATMEN: ICD-10-CM

## 2023-09-25 DIAGNOSIS — Z86.010 PERSONAL HISTORY OF COLONIC POLYPS: ICD-10-CM

## 2023-09-25 PROBLEM — K63.5 POLYP OF COLON: Status: ACTIVE | Noted: 2023-09-25

## 2023-09-25 PROCEDURE — 88305 TISSUE EXAM BY PATHOLOGIST: CPT | Mod: TC | Performed by: PATHOLOGY

## 2023-09-25 PROCEDURE — 45380 COLONOSCOPY AND BIOPSY: CPT | Performed by: INTERNAL MEDICINE

## 2023-10-02 ENCOUNTER — OFFICE VISIT (OUTPATIENT)
Dept: PRIMARY CARE | Facility: CLINIC | Age: 78
End: 2023-10-02
Payer: MEDICARE

## 2023-10-02 ENCOUNTER — TELEPHONE (OUTPATIENT)
Dept: PRIMARY CARE | Facility: CLINIC | Age: 78
End: 2023-10-02

## 2023-10-02 VITALS
HEIGHT: 59 IN | DIASTOLIC BLOOD PRESSURE: 54 MMHG | WEIGHT: 168 LBS | HEART RATE: 72 BPM | BODY MASS INDEX: 33.87 KG/M2 | RESPIRATION RATE: 18 BRPM | SYSTOLIC BLOOD PRESSURE: 132 MMHG

## 2023-10-02 DIAGNOSIS — I10 ESSENTIAL HYPERTENSION: ICD-10-CM

## 2023-10-02 DIAGNOSIS — Z98.890 HISTORY OF STENT INSERTION OF RENAL ARTERY: ICD-10-CM

## 2023-10-02 DIAGNOSIS — Z23 NEED FOR INFLUENZA VACCINATION: ICD-10-CM

## 2023-10-02 DIAGNOSIS — D89.89 KAPPA LIGHT CHAIN DISEASE (MULTI): ICD-10-CM

## 2023-10-02 DIAGNOSIS — N18.32 STAGE 3B CHRONIC KIDNEY DISEASE (MULTI): ICD-10-CM

## 2023-10-02 DIAGNOSIS — D47.2 MGUS (MONOCLONAL GAMMOPATHY OF UNKNOWN SIGNIFICANCE): ICD-10-CM

## 2023-10-02 DIAGNOSIS — E11.319 DIABETIC RETINOPATHY ASSOCIATED WITH TYPE 2 DIABETES MELLITUS, MACULAR EDEMA PRESENCE UNSPECIFIED, UNSPECIFIED LATERALITY, UNSPECIFIED RETINOPATHY SEVERITY (MULTI): ICD-10-CM

## 2023-10-02 DIAGNOSIS — E11.21 DIABETIC NEPHROPATHY ASSOCIATED WITH TYPE 2 DIABETES MELLITUS (MULTI): Primary | ICD-10-CM

## 2023-10-02 DIAGNOSIS — Z12.31 VISIT FOR SCREENING MAMMOGRAM: ICD-10-CM

## 2023-10-02 DIAGNOSIS — Z86.39 HISTORY OF HYPERKALEMIA: ICD-10-CM

## 2023-10-02 PROBLEM — J02.9 SORE THROAT: Status: RESOLVED | Noted: 2023-04-08 | Resolved: 2023-10-02

## 2023-10-02 PROBLEM — R07.9 RIGHT-SIDED CHEST PAIN: Status: RESOLVED | Noted: 2023-09-04 | Resolved: 2023-10-02

## 2023-10-02 PROBLEM — T30.0 BURN: Status: RESOLVED | Noted: 2023-09-04 | Resolved: 2023-10-02

## 2023-10-02 PROBLEM — J04.0 LARYNGITIS: Status: RESOLVED | Noted: 2023-09-04 | Resolved: 2023-10-02

## 2023-10-02 PROBLEM — M25.561 PAIN AND SWELLING OF RIGHT KNEE: Status: RESOLVED | Noted: 2023-09-04 | Resolved: 2023-10-02

## 2023-10-02 PROBLEM — M25.461 PAIN AND SWELLING OF RIGHT KNEE: Status: RESOLVED | Noted: 2023-09-04 | Resolved: 2023-10-02

## 2023-10-02 PROBLEM — R07.2 RETROSTERNAL CHEST PAIN: Status: RESOLVED | Noted: 2023-09-04 | Resolved: 2023-10-02

## 2023-10-02 PROCEDURE — 99215 OFFICE O/P EST HI 40 MIN: CPT | Performed by: INTERNAL MEDICINE

## 2023-10-02 PROCEDURE — 1126F AMNT PAIN NOTED NONE PRSNT: CPT | Performed by: INTERNAL MEDICINE

## 2023-10-02 PROCEDURE — 1160F RVW MEDS BY RX/DR IN RCRD: CPT | Performed by: INTERNAL MEDICINE

## 2023-10-02 PROCEDURE — 3075F SYST BP GE 130 - 139MM HG: CPT | Performed by: INTERNAL MEDICINE

## 2023-10-02 PROCEDURE — 1159F MED LIST DOCD IN RCRD: CPT | Performed by: INTERNAL MEDICINE

## 2023-10-02 PROCEDURE — 3078F DIAST BP <80 MM HG: CPT | Performed by: INTERNAL MEDICINE

## 2023-10-02 PROCEDURE — 90662 IIV NO PRSV INCREASED AG IM: CPT | Performed by: INTERNAL MEDICINE

## 2023-10-02 PROCEDURE — 1036F TOBACCO NON-USER: CPT | Performed by: INTERNAL MEDICINE

## 2023-10-02 PROCEDURE — G0008 ADMIN INFLUENZA VIRUS VAC: HCPCS | Performed by: INTERNAL MEDICINE

## 2023-10-02 RX ORDER — EMPAGLIFLOZIN 25 MG/1
1 TABLET, FILM COATED ORAL
COMMUNITY
Start: 2023-09-19 | End: 2023-10-24 | Stop reason: SDUPTHER

## 2023-10-02 RX ORDER — SEMAGLUTIDE 0.68 MG/ML
INJECTION, SOLUTION SUBCUTANEOUS
COMMUNITY
Start: 2023-09-19 | End: 2023-10-24 | Stop reason: SDUPTHER

## 2023-10-02 ASSESSMENT — PAIN SCALES - GENERAL: PAINLEVEL: 0-NO PAIN

## 2023-10-02 NOTE — PROGRESS NOTES
Subjective   Patient ID: Eleonora Varela is a 78 y.o. female who presents for follow up on multiple med issues. Had acolonoscopy 8-25 and had visit with dr smith, hua collins the nurse for [facility].     LAST VISIT PLAN 6/072023  PATIENT'S DISCUSSION/SUMMARY:  Thank You for coming in for your annual wellness visit.  We are making a referral to the [facility] program to look for thorough assessment of diabetes, kidney disease, heart and see about additional medications that can help protect your kidneys and heart going forward. They should call you.  Keep your last appt. With Dr Shelley, and then we can manage diabetes here for now.  We talked about adding medications such as farxiga or jardiance (pill) or Trulicity or Ozempic. Will discuss next visit.  Pelvic to check urethral caruncle next visit.  Follow up 3 months.      PROVIDER'S IMPRESSIONS:  Agatston CAC score, >400  Diabetic nephropathy associated with type 2 diabetes mellitus (CMS/HCC)  Stage 3b chronic kidney disease (CMS/HCC)  Coronary artery disease involving native coronary artery of native heart without angina pectoris  Primary hypertension  Renal artery stenosis (CMS/HCC)  Moderate nonproliferative diabetic retinopathy of both eyes without macular edema associated with type 2 diabetes mellitus (CMS/HCC)  Type 2 diabetes mellitus with hypoglycemia without coma, with long-term current use of insulin (CMS/HCC)  Type 2 diabetes mellitus with insulin therapy (CMS/HCC)  Primary osteoarthritis of right knee  Kappa light chain disease (CMS/HCC)  MGUS (monoclonal gammopathy of unknown significance)  History of stent insertion of renal artery  Encounter for subsequent annual wellness visit (AWV) in Medicare patient  Annual visit for general adult medical examination with abnormal findings  Vertigo  Encounter for screening for other disorder  Full code status  Orders Placed  POCT glycosylated hemoglobin (Hb A1C) manually resulted  Referral to [facility]  Authorized  Other Orders Performed  Follow Up In [facility] - PCP  END INFO FROM PRIOR VISIT    HPI    Here to follow up on conditions and after eval by team at  [facility]  Dr smith told her to stop nph and start lantus in the am 40 units and stick with ssi.  Started ozempic and jardiance 25mg right after colonoscopy  Had pharm d meeting who went over both meds.  They did not order labs to follow up on the jardiance, will order now.-will discuss with pharm d  She has had one dose of ozempic. No issues with it.    Risks of GLP-1 reviewed. These include but are not limited to pancreatitis, gallstones (with any weight loss program), MEN medullary thyroid tumors seen if FMH medullary thyroid ca or MEN,   N/v/abdominal pain, allergic reaction.  Benefits: decrease in appetite, improvement in weight loss, improved glycemic control, reduction in MACE especially in patients with CKD and DM2, improvement in lipids.     GLP-1 agonists (Trulicity, Victoza, Ozempic, Wegovy, Rybelsus, Bydureon): No  vomiting, abdominal pain and anterior neck pain/swelling. Has noted decrease in appetite and  vague nausea.    SGLT-2 inhibitors (Farxiga, Jardiance, Invokana): No hematuria, dysuria, or yeast infections.   Discussed risk of uti (she has never had one) yeast infections (does not get), exac of ckd.inc K    SMBG MEASUREMENTS - CGMdate        7 days       30 days  Above %     54    61   In range    %     46    39  Low    %     0    0    Average Glucose      mg/dL    184     212  Sensor Usage      %    Any episodes of symptomatic hypoglycemia? no     Review of Systems   Eyes:  Negative for blurred vision.   Cardiovascular:  Negative for chest pain. No palpitations not sob not dizzy  Is allowed back in the indoor pool but only the one that has and she has to wear a weighted belt and she cannot swim properly. For the past 2 weeks not in the pool as she can not get into the exercise pool yet til December, suggest she try kick  board.  Endocrine: Positive for polydipsia-dry mouth. Negative for polyphagia and polyuria.       Her insurance is covering well     Colonoscopy had a benign polyp results pending.   See dr dan this week for kappa lambda , labs looked stable.  No night sweats fevers.    Will get flu shot today.  Get covid vaccine next and then get rsv.    Needs fluvastatin and ezetimibe  Had pre cinema labs. A1c 8.4.   Ldl 74 and cardio ok with this.  Creat 1.45 stable--was 1.29 prior but has been higher than 1.45 in the apst  Bemiss lambda labs stable.    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  Current Outpatient Medications   Medication Instructions    amLODIPine (Norvasc) 10 mg tablet 1 tablet, oral, Daily    aspirin 81 mg EC tablet 1 tablet, oral, Daily    blood sugar diagnostic strip miscellaneous, 3 times daily    carvedilol (COREG) 25 mg, oral, 2 times daily with meals    cholecalciferol (VITAMIN D-3) 25 mcg, oral, Daily    clobetasol (Temovate) 0.05 % external solution apply to the nail beds once nightly for 3 weeks then 2 times per week for maintenance thereafter if needed<BR>    cyanocobalamin, vitamin B-12, 1,000 mcg tablet, sublingual sublingual, one tab sublingual daily five days per week, monday thru friday    diabetic supplies, miscellan. misc miscellaneous, Onetouch ultrasoft lancets misc    diclofenac sodium 1 % kit apply 2 grams, 2-3 times per day to right knee for    empagliflozin (Jardiance) 25 mg TAKE 1 TABLET EVERY MORNING    ezetimibe (ZETIA) 10 mg, oral, Daily, DAILY    fluvastatin (LESCOL) 40 mg, oral, Nightly    FreeStyle Cory sensor system (FreeStyle Cory 2 Sensor) kit Use as instructed    hydroCHLOROthiazide (HYDRODiuril) 25 mg tablet 1 tablet, oral, Once, DAILY    insulin glargine (LANTUS SOLOSTAR U-100 INSULIN) 20 Units, subcutaneous,  Nightly, Take as directed per insulin instructions.    insulin lispro (HumaLOG) 100 unit/mL injection Inject insulin in divided doses at breakfast, lunch, and dinner. Total daily dose 75 units.    insulin lispro protamin-lispro (HumaLOG Mix 75-25,U-100,Insuln) 100 unit/mL (75-25) suspension injection 75 Units, subcutaneous, Daily    Jardiance 25 mg 1 tablet, oral, Daily before breakfast    lancets (MICROLET LANCET MISC) miscellaneous, Test sugar 6 times a day    loratadine (CLARITIN REDITABS) 10 mg, oral, Daily    MAGNESIUM CITRATE ORAL 1 tablet, oral, Nightly, MAGNESIUM CITRATE 200 MG    meclizine (ANTIVERT) 12.5 mg, oral, Every 24 hours    omega-3/dha/epa/fish oil (OMEGA-3 ORAL) 1 capsule, oral, 2 times daily, 1,000 MG 2 TIMES A DAY    Ozempic 0.25 mg or 0.5 mg (2 mg/3 mL) pen injector subcutaneous    psyllium (Metamucil) 3.4 gram packet oral, Nightly, Dissolve 1 tsp in 8 oz of water and drink daily at bedtime and not near other medication, forconstipation    semaglutide 0.25 mg or 0.5 mg (2 mg/3 mL) pen injector INJECT 0.25MG UNDER THE SKIN ONCE WEEKLY     Allergies   Allergen Reactions    Adhesive Tape-Silicones Unknown     Steristrips - takes skin off    Lisinopril Other    Propoxyphene N-Acetaminophen Other and Unknown     SLEEPLESSNESS    Cefaclor Palpitations and Unknown     unknown    Latex Rash     Objective   Lab Results   Component Value Date    WBC 6.6 03/27/2023    HGB 14.4 03/27/2023    HCT 43.3 03/27/2023     03/27/2023    CHOL 150 09/16/2023    TRIG 143 09/16/2023    HDL 42.5 09/16/2023    ALT 27 09/16/2023    AST 22 09/16/2023     10/14/2023    K 3.9 10/14/2023    CL 98 10/14/2023    CREATININE 1.54 (H) 10/14/2023    BUN 22 10/14/2023    CO2 30 10/14/2023    TSH 1.48 05/19/2023    INR 0.9 05/09/2022    HGBA1C 8.5 (A) 09/16/2023     Component      Latest Ref Rng 1/3/2023 3/27/2023 5/19/2023 9/16/2023   GLUCOSE      74 - 99 mg/dL 435 (H)  231 (H)  305 (H)  252 (H)    SODIUM      136 -  "145 mmol/L 137  137  140  139    POTASSIUM      3.5 - 5.3 mmol/L 5.1  4.2  4.4  4.4    CHLORIDE      98 - 107 mmol/L 99  100  102  100    Bicarbonate      21 - 32 mmol/L 29  30  31  33 (H)    Anion Gap      10 - 20 mmol/L 14  11  11  10    Blood Urea Nitrogen      6 - 23 mg/dL 21  26 (H)  24 (H)  21    Creatinine      0.50 - 1.05 mg/dL 1.55 (H)  1.33 (H)  1.29 (H)  1.45 (H)    Calcium      8.6 - 10.6 mg/dL 9.4  9.4  9.9  9.6    Albumin      3.4 - 5.0 g/dL 4.2  3.9  4.2  4.4    Alkaline Phosphatase      33 - 136 U/L  76  114  97    Total Protein      6.4 - 8.2 g/dL 6.3 (L)  6.5  6.7  6.9    AST      9 - 39 U/L  22  24  22    Bilirubin Total      0.0 - 1.2 mg/dL  0.9  0.9  1.0    ALT      7 - 45 U/L  32  29  27    GFR Female      >90 mL/min/1.73m2 34 !  41 !  43 !  37 !         Physical ExamBP 132/54 (BP Location: Left arm, Patient Position: Sitting, BP Cuff Size: Adult)   Pulse 72   Resp 18   Ht 1.499 m (4' 11\")   Wt 76.2 kg (168 lb)   BMI 33.93 kg/m²       2/6/2023    10:16 AM 3/7/2023     8:51 AM 4/5/2023     5:10 PM 6/7/2023     5:02 PM 6/12/2023    10:23 AM 6/21/2023     9:04 AM 10/2/2023     8:37 AM   Vitals   Systolic 160 132 140 162 140 168 132   Diastolic 80 54 58 66 60 93 54   Heart Rate 74 64 77 64 72 63 72   Temp 36.1 °C (96.9 °F)  37.6 °C (99.7 °F)  36.1 °C (96.9 °F)     Resp  18 18 18   18   Height (in) 1.524 m (5')  1.524 m (5') 1.524 m (5') 1.524 m (5') 1.524 m (5') 1.499 m (4' 11\")   Weight (lb) 167 167 158 158 168 165 168   BMI 32.61 kg/m2 32.61 kg/m2 30.86 kg/m2 30.86 kg/m2 32.81 kg/m2 32.22 kg/m2 33.93 kg/m2   BSA (m2) 1.79 m2 1.79 m2 1.74 m2 1.74 m2 1.8 m2 1.78 m2 1.78 m2   Visit Report  Report Report Report   Report   She was 173 pounds 9-29 at cinema visit. So down 5 pds including colonoscopy prep.    Patient looks well and is in no obvious distress.  HEENT:   Normocephalic, no facial asymmetry  Eyes: Sclera and conjunctiva are clear.  Neck: No adenopathy cervical (Ant/post/lat), no " Supraclavicular nodes.   Thyroid normal.  Carotid pulses normal, no carotid bruits.  Lungs : RR normal. Clear to auscultation anterior, posterior and lateral. No rales, wheezes rhonchi or rubs. Good air exchange.  Heart: RRR. 1/6 systolic Murmur ursb unchanged. Nogallop, click or rub.  Vascular:  Posterior tibialis 2+ and dorsalis pedis pulses 1+ bilaterally.   Extremities: no upper extremity edema. No lower extremity edema.   Musculoskeletal: no synovitis of joints seen. No new deformity.  Neuro: CN 2-12 intact. Alert, appropriate.  Ambulates independently.  No gross motor deficit.   No tremors.  Psych: normal mood and affect.  Skin: No rash, bruising petechiae or jaundice.  Feet no skin lesions  Diagnoses and all orders for this visit:  Diabetic nephropathy associated with type 2 diabetes mellitus (CMS/HCC) (Primary)  -     Cancel: Basic metabolic panel; Future  -     Cancel: Magnesium; Future  -     Cancel: Basic metabolic panel; Future  -     Hemoglobin A1c; Future  -     Basic metabolic panel; Future  -     Magnesium; Future  -     Basic metabolic panel; Future  MGUS (monoclonal gammopathy of unknown significance)  Kappa light chain disease (CMS/HCC)  Need for influenza vaccination  -     Flu vaccine, quadrivalent, high-dose, preservative free, age 65y+ (FLUZONE)  Stage 3b chronic kidney disease (CMS/HCC)  History of hyperkalemia  Diabetic retinopathy associated with type 2 diabetes mellitus, macular edema presence unspecified, unspecified laterality, unspecified retinopathy severity (CMS/[facility])  Essential hypertension  History of stent insertion of renal artery  Visit for screening mammogram  -     BI mammo bilateral screening tomosynthesis; Future      @[email or web address]  Is now on jardiance and ozempic.  Doing well. 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

## 2023-10-02 NOTE — PATIENT INSTRUCTIONS
Flu shot today.  Covid vaccine soon.  Then RSV vaccine.    Blood test not fasting in 2 -3 weeks and then again in mid December.    Follow up December 18  please do the blood test before the appt.

## 2023-10-02 NOTE — TELEPHONE ENCOUNTER
Patient stated she should have a mammogram order. When created I will call her and schedule for her. [phone]

## 2023-10-03 ENCOUNTER — TELEPHONE (OUTPATIENT)
Dept: CARDIOLOGY | Facility: HOSPITAL | Age: 78
End: 2023-10-03
Payer: MEDICARE

## 2023-10-03 NOTE — TELEPHONE ENCOUNTER
[facility] follow up note    Called pt for 2 week follow up. She reports to be doing well. She started 0.25mg Ozempic, and synjardy, tolerating fine. She stopped NPH, and is taking 40 units of lantus, humalog sliding scale ~ 15 units before each meal. She does have low sugars in the mornings after her water aerobics. Suggested she decrease her breakfast humalog to 10 units on those days. Pt will continue to monitor her glucose levels, she is aware we will likely need to titrate down on her insulin as we increase ozempic.   I will follow up with a phone call again in 2-3 weeks to see how she is doing. I did encourage her to reach out before then if she has questions or experiencing hypoglycemia.    Keiko Franklin RD, [facility]– [place][note 2]

## 2023-10-06 ENCOUNTER — TELEPHONE (OUTPATIENT)
Dept: HEMATOLOGY/ONCOLOGY | Facility: CLINIC | Age: 78
End: 2023-10-06
Payer: MEDICARE

## 2023-10-06 DIAGNOSIS — D89.89 KAPPA LIGHT CHAIN DISEASE (MULTI): ICD-10-CM

## 2023-10-06 NOTE — TELEPHONE ENCOUNTER
Call placed to patient.  Requested to move FUV to December.  Patient agreeable to plan.  Scheduling to schedule.

## 2023-10-06 NOTE — TELEPHONE ENCOUNTER
Call placed  to patient to discuss moving follow-up out to December.  Patient agreeable and appreciative as she has seen many doctors in recent months.  Call back instructions reviewed.  Patient verbalized understanding.   Scheduling to schedule.

## 2023-10-09 ENCOUNTER — TELEPHONE (OUTPATIENT)
Dept: CARDIOLOGY | Facility: HOSPITAL | Age: 78
End: 2023-10-09
Payer: MEDICARE

## 2023-10-09 ENCOUNTER — TELEPHONE (OUTPATIENT)
Dept: PRIMARY CARE | Facility: CLINIC | Age: 78
End: 2023-10-09
Payer: MEDICARE

## 2023-10-09 DIAGNOSIS — E11.69 HYPERLIPIDEMIA ASSOCIATED WITH TYPE 2 DIABETES MELLITUS (MULTI): ICD-10-CM

## 2023-10-09 DIAGNOSIS — E78.5 HYPERLIPIDEMIA ASSOCIATED WITH TYPE 2 DIABETES MELLITUS (MULTI): ICD-10-CM

## 2023-10-09 DIAGNOSIS — I10 PRIMARY HYPERTENSION: ICD-10-CM

## 2023-10-09 RX ORDER — EZETIMIBE 10 MG/1
10 TABLET ORAL DAILY
Qty: 90 TABLET | Refills: 1 | Status: SHIPPED | OUTPATIENT
Start: 2023-10-09 | End: 2023-12-05 | Stop reason: SDUPTHER

## 2023-10-09 RX ORDER — FLUVASTATIN 40 MG/1
40 CAPSULE ORAL NIGHTLY
Qty: 90 CAPSULE | Refills: 1 | Status: SHIPPED | OUTPATIENT
Start: 2023-10-09 | End: 2023-12-18 | Stop reason: SDUPTHER

## 2023-10-09 NOTE — TELEPHONE ENCOUNTER
JETI: Pt called and wanted to let you know she is getting her RSV on 10/14, and her mammogram on 11/2. She will get her Covid vaccine after the mammogram.

## 2023-10-09 NOTE — TELEPHONE ENCOUNTER
CIN[facility] follow up     Called pt per her request, she is experiencing hypoglycemia the last few days around 4:00pm. She takes 15 units humalog with lunch at noon. Suggested she decrease to 10 and see how that works for the next few days.     Reviewed proper treatment of hypoglycemia.     Answered questions about snacks.     Pt will reach out again if she has questions or experiencing reoccurring hypoglycemia.     Keiko Franklin RD, [facility]

## 2023-10-10 ENCOUNTER — APPOINTMENT (OUTPATIENT)
Dept: HEMATOLOGY/ONCOLOGY | Facility: CLINIC | Age: 78
End: 2023-10-10
Payer: MEDICARE

## 2023-10-14 ENCOUNTER — LAB (OUTPATIENT)
Dept: LAB | Facility: LAB | Age: 78
End: 2023-10-14
Payer: MEDICARE

## 2023-10-14 DIAGNOSIS — E11.9 TYPE 2 DIABETES MELLITUS WITHOUT COMPLICATION, WITHOUT LONG-TERM CURRENT USE OF INSULIN (MULTI): Primary | ICD-10-CM

## 2023-10-14 DIAGNOSIS — E11.21 DIABETIC NEPHROPATHY ASSOCIATED WITH TYPE 2 DIABETES MELLITUS (MULTI): ICD-10-CM

## 2023-10-14 LAB
ANION GAP SERPL CALC-SCNC: 15 MMOL/L (ref 10–20)
BUN SERPL-MCNC: 22 MG/DL (ref 6–23)
CALCIUM SERPL-MCNC: 9.8 MG/DL (ref 8.6–10.6)
CHLORIDE SERPL-SCNC: 98 MMOL/L (ref 98–107)
CO2 SERPL-SCNC: 30 MMOL/L (ref 21–32)
CREAT SERPL-MCNC: 1.54 MG/DL (ref 0.5–1.05)
GFR SERPL CREATININE-BSD FRML MDRD: 35 ML/MIN/1.73M*2
GLUCOSE SERPL-MCNC: 216 MG/DL (ref 74–99)
MAGNESIUM SERPL-MCNC: 2.61 MG/DL (ref 1.6–2.4)
POTASSIUM SERPL-SCNC: 3.9 MMOL/L (ref 3.5–5.3)
SODIUM SERPL-SCNC: 139 MMOL/L (ref 136–145)

## 2023-10-14 PROCEDURE — 36415 COLL VENOUS BLD VENIPUNCTURE: CPT

## 2023-10-14 PROCEDURE — 80048 BASIC METABOLIC PNL TOTAL CA: CPT

## 2023-10-14 PROCEDURE — 83735 ASSAY OF MAGNESIUM: CPT

## 2023-10-15 PROBLEM — N25.81 HYPERPARATHYROIDISM DUE TO RENAL INSUFFICIENCY (MULTI): Status: RESOLVED | Noted: 2023-06-07 | Resolved: 2023-10-15

## 2023-10-15 PROBLEM — H81.10 BPV (BENIGN POSITIONAL VERTIGO): Status: RESOLVED | Noted: 2023-01-20 | Resolved: 2023-10-15

## 2023-10-15 NOTE — RESULT ENCOUNTER NOTE
Please call the patient regarding her result.  Potassium is ok.  Kidney blood tests mildly worse than last check but similar to levels in the past, would repeat labs  4-6 weeks/not fasting

## 2023-10-16 ENCOUNTER — PHARMACY VISIT (OUTPATIENT)
Dept: PHARMACY | Facility: CLINIC | Age: 78
End: 2023-10-16
Payer: COMMERCIAL

## 2023-10-16 DIAGNOSIS — Z79.4 TYPE 2 DIABETES MELLITUS WITH INSULIN THERAPY (MULTI): ICD-10-CM

## 2023-10-16 DIAGNOSIS — E11.9 TYPE 2 DIABETES MELLITUS WITH INSULIN THERAPY (MULTI): ICD-10-CM

## 2023-10-16 PROCEDURE — RXMED WILLOW AMBULATORY MEDICATION CHARGE

## 2023-10-21 RX ORDER — SEMAGLUTIDE 0.68 MG/ML
INJECTION, SOLUTION SUBCUTANEOUS
Qty: 3 ML | Refills: 0 | Status: SHIPPED | OUTPATIENT
Start: 2023-10-21 | End: 2023-12-19 | Stop reason: DRUGHIGH

## 2023-10-23 ENCOUNTER — PHARMACY VISIT (OUTPATIENT)
Dept: PHARMACY | Facility: CLINIC | Age: 78
End: 2023-10-23
Payer: COMMERCIAL

## 2023-10-23 PROCEDURE — RXMED WILLOW AMBULATORY MEDICATION CHARGE

## 2023-10-24 ENCOUNTER — OFFICE VISIT (OUTPATIENT)
Dept: CARDIOLOGY | Facility: CLINIC | Age: 78
End: 2023-10-24
Payer: MEDICARE

## 2023-10-24 VITALS
WEIGHT: 166 LBS | DIASTOLIC BLOOD PRESSURE: 52 MMHG | OXYGEN SATURATION: 95 % | HEART RATE: 74 BPM | SYSTOLIC BLOOD PRESSURE: 127 MMHG | HEIGHT: 60 IN | BODY MASS INDEX: 32.59 KG/M2

## 2023-10-24 DIAGNOSIS — E78.5 HYPERLIPIDEMIA ASSOCIATED WITH TYPE 2 DIABETES MELLITUS (MULTI): ICD-10-CM

## 2023-10-24 DIAGNOSIS — E11.69 HYPERLIPIDEMIA ASSOCIATED WITH TYPE 2 DIABETES MELLITUS (MULTI): ICD-10-CM

## 2023-10-24 DIAGNOSIS — E78.49 OTHER HYPERLIPIDEMIA: ICD-10-CM

## 2023-10-24 DIAGNOSIS — I10 ESSENTIAL HYPERTENSION: ICD-10-CM

## 2023-10-24 DIAGNOSIS — Z79.4 TYPE 2 DIABETES MELLITUS WITH INSULIN THERAPY (MULTI): ICD-10-CM

## 2023-10-24 DIAGNOSIS — Q21.10 ATRIAL SEPTAL DEFECT (HHS-HCC): Primary | ICD-10-CM

## 2023-10-24 DIAGNOSIS — Z98.890 HISTORY OF STENT INSERTION OF RENAL ARTERY: ICD-10-CM

## 2023-10-24 DIAGNOSIS — I10 PRIMARY HYPERTENSION: ICD-10-CM

## 2023-10-24 DIAGNOSIS — I25.10 CORONARY ARTERY DISEASE INVOLVING NATIVE CORONARY ARTERY OF NATIVE HEART WITHOUT ANGINA PECTORIS: ICD-10-CM

## 2023-10-24 DIAGNOSIS — R93.1 AGATSTON CAC SCORE, >400: ICD-10-CM

## 2023-10-24 DIAGNOSIS — E11.9 TYPE 2 DIABETES MELLITUS WITH INSULIN THERAPY (MULTI): ICD-10-CM

## 2023-10-24 PROCEDURE — 1160F RVW MEDS BY RX/DR IN RCRD: CPT | Performed by: STUDENT IN AN ORGANIZED HEALTH CARE EDUCATION/TRAINING PROGRAM

## 2023-10-24 PROCEDURE — 1036F TOBACCO NON-USER: CPT | Performed by: STUDENT IN AN ORGANIZED HEALTH CARE EDUCATION/TRAINING PROGRAM

## 2023-10-24 PROCEDURE — 99213 OFFICE O/P EST LOW 20 MIN: CPT | Performed by: STUDENT IN AN ORGANIZED HEALTH CARE EDUCATION/TRAINING PROGRAM

## 2023-10-24 PROCEDURE — 3074F SYST BP LT 130 MM HG: CPT | Performed by: STUDENT IN AN ORGANIZED HEALTH CARE EDUCATION/TRAINING PROGRAM

## 2023-10-24 PROCEDURE — 3078F DIAST BP <80 MM HG: CPT | Performed by: STUDENT IN AN ORGANIZED HEALTH CARE EDUCATION/TRAINING PROGRAM

## 2023-10-24 PROCEDURE — 1159F MED LIST DOCD IN RCRD: CPT | Performed by: STUDENT IN AN ORGANIZED HEALTH CARE EDUCATION/TRAINING PROGRAM

## 2023-10-24 PROCEDURE — 1126F AMNT PAIN NOTED NONE PRSNT: CPT | Performed by: STUDENT IN AN ORGANIZED HEALTH CARE EDUCATION/TRAINING PROGRAM

## 2023-10-24 ASSESSMENT — COLUMBIA-SUICIDE SEVERITY RATING SCALE - C-SSRS
2. HAVE YOU ACTUALLY HAD ANY THOUGHTS OF KILLING YOURSELF?: NO
6. HAVE YOU EVER DONE ANYTHING, STARTED TO DO ANYTHING, OR PREPARED TO DO ANYTHING TO END YOUR LIFE?: NO
1. IN THE PAST MONTH, HAVE YOU WISHED YOU WERE DEAD OR WISHED YOU COULD GO TO SLEEP AND NOT WAKE UP?: NO

## 2023-10-24 ASSESSMENT — PATIENT HEALTH QUESTIONNAIRE - PHQ9
SUM OF ALL RESPONSES TO PHQ9 QUESTIONS 1 AND 2: 0
1. LITTLE INTEREST OR PLEASURE IN DOING THINGS: NOT AT ALL
2. FEELING DOWN, DEPRESSED OR HOPELESS: NOT AT ALL

## 2023-10-24 NOTE — PROGRESS NOTES
Chief Complaint:   Follow-up     History Of Present Illness:    Eleonora Varela is a 78 y.o. female presents for follow-up.  She is established with similar program.  Her blood sugars have improved.  Her most recent LDL 79 which is slightly above her previous test.  She denies chest pain or shortness of breath.  Patient underwent a nuclear stress test which was negative for underlying ischemia.  Her renal artery ultrasound shows higher velocities as compared to prior assessment.  She needs to follow-up with Dr. Bennett.  Today to vital signs show blood pressure 127/52 heart rate is 74 weight of 166 pounds.     Last Recorded Vitals:  Vitals:    10/24/23 1206   BP: 127/52   BP Location: Right arm   Patient Position: Sitting   BP Cuff Size: Large adult   Pulse: 74   SpO2: 95%   Weight: 75.3 kg (166 lb)   Height: 1.524 m (5')       Review of Systems  ROS      Allergies:  Propoxyphene n-acetaminophen, Adhesive tape-silicones, Cefaclor, and Latex    Outpatient Medications:  Current Outpatient Medications   Medication Instructions    amLODIPine (Norvasc) 10 mg tablet 1 tablet, oral, Daily    aspirin 81 mg EC tablet 1 tablet, oral, Daily    blood sugar diagnostic strip ONE TOUCH ULTRA 2 TEST STRIPS    carvedilol (COREG) 25 mg, oral, 2 times daily with meals    cholecalciferol (VITAMIN D-3) 25 mcg, oral, Daily    clobetasol (Temovate) 0.05 % external solution apply to the nail beds once nightly for 3 weeks then 2 times per week for maintenance thereafter if needed<BR>    cyanocobalamin, vitamin B-12, 1,000 mcg tablet, sublingual sublingual, one tab sublingual daily five days per week, monday thru friday    diabetic supplies, miscellan. misc miscellaneous, Onetouch ultrasoft lancets misc    diclofenac sodium 1 % kit apply 2 grams, 2-3 times per day to right knee for    empagliflozin (Jardiance) 25 mg TAKE 1 TABLET EVERY MORNING    ezetimibe (ZETIA) 10 mg, oral, Daily, DAILY    fluvastatin (LESCOL) 40 mg, oral, Nightly    FreeStyle  Cory sensor system (FreeStyle Cory 2 Sensor) kit Use as instructed    hydroCHLOROthiazide (HYDRODiuril) 25 mg tablet 1 tablet, oral, Once, DAILY    insulin glargine (LANTUS SOLOSTAR U-100 INSULIN) 20 Units, subcutaneous, Nightly, Take as directed per insulin instructions.    insulin lispro (HumaLOG) 100 unit/mL injection Inject insulin in divided doses at breakfast, lunch, and dinner. Total daily dose 75 units.    lancets (MICROLET LANCET MISC) miscellaneous, Test sugar 6 times a day    loratadine (CLARITIN REDITABS) 10 mg, oral, Daily    MAGNESIUM CITRATE ORAL 1 tablet, oral, Nightly, MAGNESIUM CITRATE 200 MG    meclizine (ANTIVERT) 12.5 mg, oral, Every 24 hours    omega-3/dha/epa/fish oil (OMEGA-3 ORAL) 1 capsule, oral, 2 times daily, 1,000 MG 2 TIMES A DAY    psyllium (Metamucil) 3.4 gram packet oral, Nightly, Dissolve 1 tsp in 8 oz of water and drink daily at bedtime and not near other medication, forconstipation    semaglutide (Ozempic) 0.25 mg or 0.5 mg (2 mg/3 mL) pen injector INJECT 0.25MG UNDER THE SKIN ONCE WEEKLY       Physical Exam:  General: No acute distress,  A&O x3  Skin: Warm and dry  Neck: JVD is not elevated  ENT: Moist mucous membranes no lesions appreciated  Pulmonary: CTAB  Cards: Regular rate rhythm, no murmurs gallops or rubs appreciated normal S1-S2  Abdomen: Soft nontender nondistended  Extremities: No edema or cyanosis  Psych: Appropriate mood and affect        Last Labs:  CBC -  Lab Results   Component Value Date    WBC 6.6 03/27/2023    HGB 14.4 03/27/2023    HCT 43.3 03/27/2023    MCV 90 03/27/2023     03/27/2023       CMP -  Lab Results   Component Value Date    CALCIUM 9.8 10/14/2023    PHOS 3.7 01/03/2023    PROT 6.9 09/16/2023    ALBUMIN 4.4 09/16/2023    AST 22 09/16/2023    ALT 27 09/16/2023    ALKPHOS 97 09/16/2023    BILITOT 1.0 09/16/2023       LIPID PANEL -   Lab Results   Component Value Date    CHOL 150 09/16/2023    TRIG 143 09/16/2023    HDL 42.5 09/16/2023     CHHDL 3.5 09/16/2023    LDLF 79 09/16/2023    VLDL 29 09/16/2023    NHDL 107 09/13/2019       RENAL FUNCTION PANEL -   Lab Results   Component Value Date    GLUCOSE 216 (H) 10/14/2023     10/14/2023    K 3.9 10/14/2023    CL 98 10/14/2023    CO2 30 10/14/2023    ANIONGAP 15 10/14/2023    BUN 22 10/14/2023    CREATININE 1.54 (H) 10/14/2023    CALCIUM 9.8 10/14/2023    PHOS 3.7 01/03/2023    ALBUMIN 4.4 09/16/2023        Lab Results   Component Value Date    HGBA1C 8.5 (A) 09/16/2023    HGBA1C 8.4 (A) 06/07/2023       Assessment and Plan    1. CAD based on recent calcium score of 797: Primarily distributed LAD and RCA region. Atypical symptoms of chest discomfort in January. Risk factors of high blood pressure hyperlipidemia diabetes renal disease. Nuclear stress test shows appropriate exercise capacity achieving 7 METS and 85% of age-predicted maximal heart rate. There was no evidence of reversible ischemia by EKG or perfusion imaging.  A repeat stress test was ordered in June of this year which showed no evidence of ischemia by EKG or perfusion.  Continue current medical therapy for now.     2. Reported hx of ASD, not appreciated on recent echocardiogram from 2015. Continue to monitor for now     3. Hypertension: Controlled today.  Blood pressures have been ranging higher at home.  We will continue to monitor closely.      4. Renal artery stenosis: High-grade ostial left side lesion. Status post PCI to the left renal artery. Now off of Plavix and maintain on aspirin.  Repeat renal artery ultrasound showed elevated velocities.  Will defer to endovascular regarding intervention.     5. Hyperlipidemia: Lipids are elevated based on most recent cholesterol panel. Currently takes fluvastatin 40 mg. Apparently has a history of myalgias. Tolerating Zetia.        Ahsan Cook MD PhD

## 2023-10-25 DIAGNOSIS — Z79.4 TYPE 2 DIABETES MELLITUS WITH INSULIN THERAPY (MULTI): ICD-10-CM

## 2023-10-25 DIAGNOSIS — E11.9 TYPE 2 DIABETES MELLITUS WITH INSULIN THERAPY (MULTI): ICD-10-CM

## 2023-10-25 NOTE — TELEPHONE ENCOUNTER
Darrel [facility] wants to know if we can send a new rx for one touch plus test strips TID because insurance will only cover this amt.

## 2023-11-01 ENCOUNTER — HOSPITAL ENCOUNTER (OUTPATIENT)
Dept: VASCULAR MEDICINE | Facility: CLINIC | Age: 78
Discharge: HOME | End: 2023-11-01
Payer: MEDICARE

## 2023-11-01 DIAGNOSIS — I70.1 RAS (RENAL ARTERY STENOSIS) (CMS-HCC): ICD-10-CM

## 2023-11-01 PROCEDURE — 93975 VASCULAR STUDY: CPT | Performed by: SURGERY

## 2023-11-01 PROCEDURE — 93975 VASCULAR STUDY: CPT

## 2023-11-02 ENCOUNTER — ANCILLARY PROCEDURE (OUTPATIENT)
Dept: RADIOLOGY | Facility: CLINIC | Age: 78
End: 2023-11-02
Payer: MEDICARE

## 2023-11-02 DIAGNOSIS — Z12.31 VISIT FOR SCREENING MAMMOGRAM: ICD-10-CM

## 2023-11-02 PROCEDURE — 77067 SCR MAMMO BI INCL CAD: CPT | Mod: BILATERAL PROCEDURE | Performed by: RADIOLOGY

## 2023-11-02 PROCEDURE — 77063 BREAST TOMOSYNTHESIS BI: CPT | Mod: BILATERAL PROCEDURE | Performed by: RADIOLOGY

## 2023-11-02 PROCEDURE — 77067 SCR MAMMO BI INCL CAD: CPT

## 2023-11-27 ENCOUNTER — TELEPHONE (OUTPATIENT)
Dept: SCHEDULING | Age: 78
End: 2023-11-27
Payer: MEDICARE

## 2023-11-27 NOTE — TELEPHONE ENCOUNTER
Patient called in to request new lab orders be placed for her rescheduled FUV coming up on 12/12. She stated this was a reschedule from September so her labs will not be accurate.     Please enter lab orders and patient will have drawn prior to the FUV.

## 2023-11-27 NOTE — TELEPHONE ENCOUNTER
Dr. Yi you saw this patient on 4/4/23:      Patient Instructions:   RTC 6 m  CBC, CMP,,S free light chains    Would you please enter labs so I can have patient obtain labs prior to seeing you in December.    ThanksTiffany

## 2023-11-28 ENCOUNTER — APPOINTMENT (OUTPATIENT)
Dept: CARDIOLOGY | Facility: CLINIC | Age: 78
End: 2023-11-28
Payer: MEDICARE

## 2023-11-28 DIAGNOSIS — D47.2 MGUS (MONOCLONAL GAMMOPATHY OF UNKNOWN SIGNIFICANCE): Primary | ICD-10-CM

## 2023-11-29 DIAGNOSIS — Z79.4 TYPE 2 DIABETES MELLITUS WITH INSULIN THERAPY (MULTI): Primary | ICD-10-CM

## 2023-11-29 DIAGNOSIS — E11.9 TYPE 2 DIABETES MELLITUS WITH INSULIN THERAPY (MULTI): Primary | ICD-10-CM

## 2023-12-04 ENCOUNTER — LAB (OUTPATIENT)
Dept: LAB | Facility: LAB | Age: 78
End: 2023-12-04
Payer: MEDICARE

## 2023-12-04 DIAGNOSIS — D47.2 MGUS (MONOCLONAL GAMMOPATHY OF UNKNOWN SIGNIFICANCE): ICD-10-CM

## 2023-12-04 PROBLEM — E11.69 HYPERLIPIDEMIA ASSOCIATED WITH TYPE 2 DIABETES MELLITUS (MULTI): Status: RESOLVED | Noted: 2023-01-20 | Resolved: 2023-12-04

## 2023-12-04 PROBLEM — E66.09 CLASS 1 OBESITY DUE TO EXCESS CALORIES WITH BODY MASS INDEX (BMI) OF 30.0 TO 30.9 IN ADULT: Status: RESOLVED | Noted: 2023-09-04 | Resolved: 2023-12-04

## 2023-12-04 PROBLEM — E66.811 CLASS 1 OBESITY DUE TO EXCESS CALORIES WITH BODY MASS INDEX (BMI) OF 30.0 TO 30.9 IN ADULT: Status: RESOLVED | Noted: 2023-09-04 | Resolved: 2023-12-04

## 2023-12-04 PROBLEM — E78.5 HYPERLIPIDEMIA ASSOCIATED WITH TYPE 2 DIABETES MELLITUS (MULTI): Status: RESOLVED | Noted: 2023-01-20 | Resolved: 2023-12-04

## 2023-12-04 LAB
ALBUMIN SERPL BCP-MCNC: 4.2 G/DL (ref 3.4–5)
ALP SERPL-CCNC: 73 U/L (ref 33–136)
ALT SERPL W P-5'-P-CCNC: 21 U/L (ref 7–45)
ANION GAP SERPL CALC-SCNC: 10 MMOL/L (ref 10–20)
AST SERPL W P-5'-P-CCNC: 22 U/L (ref 9–39)
BASOPHILS # BLD AUTO: 0.04 X10*3/UL (ref 0–0.1)
BASOPHILS NFR BLD AUTO: 0.6 %
BILIRUB SERPL-MCNC: 1 MG/DL (ref 0–1.2)
BUN SERPL-MCNC: 28 MG/DL (ref 6–23)
CALCIUM SERPL-MCNC: 9.9 MG/DL (ref 8.6–10.6)
CHLORIDE SERPL-SCNC: 101 MMOL/L (ref 98–107)
CO2 SERPL-SCNC: 33 MMOL/L (ref 21–32)
CREAT SERPL-MCNC: 1.49 MG/DL (ref 0.5–1.05)
EOSINOPHIL # BLD AUTO: 0.17 X10*3/UL (ref 0–0.4)
EOSINOPHIL NFR BLD AUTO: 2.5 %
ERYTHROCYTE [DISTWIDTH] IN BLOOD BY AUTOMATED COUNT: 11.7 % (ref 11.5–14.5)
GFR SERPL CREATININE-BSD FRML MDRD: 36 ML/MIN/1.73M*2
GLUCOSE SERPL-MCNC: 211 MG/DL (ref 74–99)
HCT VFR BLD AUTO: 48.1 % (ref 36–46)
HGB BLD-MCNC: 16.2 G/DL (ref 12–16)
IMM GRANULOCYTES # BLD AUTO: 0.01 X10*3/UL (ref 0–0.5)
IMM GRANULOCYTES NFR BLD AUTO: 0.1 % (ref 0–0.9)
LYMPHOCYTES # BLD AUTO: 2.39 X10*3/UL (ref 0.8–3)
LYMPHOCYTES NFR BLD AUTO: 35.5 %
MCH RBC QN AUTO: 29.5 PG (ref 26–34)
MCHC RBC AUTO-ENTMCNC: 33.7 G/DL (ref 32–36)
MCV RBC AUTO: 88 FL (ref 80–100)
MONOCYTES # BLD AUTO: 0.5 X10*3/UL (ref 0.05–0.8)
MONOCYTES NFR BLD AUTO: 7.4 %
NEUTROPHILS # BLD AUTO: 3.62 X10*3/UL (ref 1.6–5.5)
NEUTROPHILS NFR BLD AUTO: 53.9 %
NRBC BLD-RTO: 0 /100 WBCS (ref 0–0)
PLATELET # BLD AUTO: 208 X10*3/UL (ref 150–450)
POTASSIUM SERPL-SCNC: 3.9 MMOL/L (ref 3.5–5.3)
PROT SERPL-MCNC: 6.7 G/DL (ref 6.4–8.2)
RBC # BLD AUTO: 5.49 X10*6/UL (ref 4–5.2)
SODIUM SERPL-SCNC: 140 MMOL/L (ref 136–145)
WBC # BLD AUTO: 6.7 X10*3/UL (ref 4.4–11.3)

## 2023-12-04 PROCEDURE — 80053 COMPREHEN METABOLIC PANEL: CPT

## 2023-12-04 PROCEDURE — 85025 COMPLETE CBC W/AUTO DIFF WBC: CPT

## 2023-12-04 PROCEDURE — 36415 COLL VENOUS BLD VENIPUNCTURE: CPT

## 2023-12-04 PROCEDURE — 83521 IG LIGHT CHAINS FREE EACH: CPT

## 2023-12-05 ENCOUNTER — OFFICE VISIT (OUTPATIENT)
Dept: CARDIOLOGY | Facility: CLINIC | Age: 78
End: 2023-12-05
Payer: MEDICARE

## 2023-12-05 VITALS
SYSTOLIC BLOOD PRESSURE: 127 MMHG | WEIGHT: 161.8 LBS | DIASTOLIC BLOOD PRESSURE: 74 MMHG | HEIGHT: 60 IN | OXYGEN SATURATION: 95 % | HEART RATE: 74 BPM | BODY MASS INDEX: 31.77 KG/M2

## 2023-12-05 DIAGNOSIS — I70.1 ATHEROSCLEROSIS OF RENAL ARTERY (CMS-HCC): ICD-10-CM

## 2023-12-05 DIAGNOSIS — I10 PRIMARY HYPERTENSION: ICD-10-CM

## 2023-12-05 DIAGNOSIS — Z98.890 HISTORY OF STENT INSERTION OF RENAL ARTERY: Primary | ICD-10-CM

## 2023-12-05 LAB
KAPPA LC SERPL-MCNC: 3.74 MG/DL (ref 0.33–1.94)
KAPPA LC/LAMBDA SER: 1.55 {RATIO} (ref 0.26–1.65)
LAMBDA LC SERPL-MCNC: 2.41 MG/DL (ref 0.57–2.63)

## 2023-12-05 PROCEDURE — 1159F MED LIST DOCD IN RCRD: CPT | Performed by: NURSE PRACTITIONER

## 2023-12-05 PROCEDURE — 1126F AMNT PAIN NOTED NONE PRSNT: CPT | Performed by: NURSE PRACTITIONER

## 2023-12-05 PROCEDURE — 1036F TOBACCO NON-USER: CPT | Performed by: NURSE PRACTITIONER

## 2023-12-05 PROCEDURE — 1160F RVW MEDS BY RX/DR IN RCRD: CPT | Performed by: NURSE PRACTITIONER

## 2023-12-05 PROCEDURE — 99213 OFFICE O/P EST LOW 20 MIN: CPT | Performed by: NURSE PRACTITIONER

## 2023-12-05 PROCEDURE — 3078F DIAST BP <80 MM HG: CPT | Performed by: NURSE PRACTITIONER

## 2023-12-05 PROCEDURE — 3074F SYST BP LT 130 MM HG: CPT | Performed by: NURSE PRACTITIONER

## 2023-12-05 RX ORDER — CARVEDILOL 25 MG/1
25 TABLET ORAL
Qty: 180 TABLET | Refills: 3 | Status: SHIPPED | OUTPATIENT
Start: 2023-12-05 | End: 2023-12-18 | Stop reason: SDUPTHER

## 2023-12-05 RX ORDER — HYDROCHLOROTHIAZIDE 25 MG/1
25 TABLET ORAL DAILY
Qty: 90 TABLET | Refills: 3 | Status: SHIPPED | OUTPATIENT
Start: 2023-12-05 | End: 2023-12-18 | Stop reason: SDUPTHER

## 2023-12-05 RX ORDER — EZETIMIBE 10 MG/1
10 TABLET ORAL DAILY
Qty: 90 TABLET | Refills: 3 | Status: SHIPPED | OUTPATIENT
Start: 2023-12-05 | End: 2023-12-18 | Stop reason: SDUPTHER

## 2023-12-05 ASSESSMENT — PATIENT HEALTH QUESTIONNAIRE - PHQ9: 1. LITTLE INTEREST OR PLEASURE IN DOING THINGS: NOT AT ALL

## 2023-12-05 ASSESSMENT — COLUMBIA-SUICIDE SEVERITY RATING SCALE - C-SSRS
2. HAVE YOU ACTUALLY HAD ANY THOUGHTS OF KILLING YOURSELF?: NO
1. IN THE PAST MONTH, HAVE YOU WISHED YOU WERE DEAD OR WISHED YOU COULD GO TO SLEEP AND NOT WAKE UP?: NO
6. HAVE YOU EVER DONE ANYTHING, STARTED TO DO ANYTHING, OR PREPARED TO DO ANYTHING TO END YOUR LIFE?: NO

## 2023-12-05 NOTE — PROGRESS NOTES
Eleonora Varela is a 78 y.o. female     History Of Present Illness   Ms Varela is a 77-year-old woman with HTN, DM, CKD and CAD, with difficult to control BP. Patient underwent stent placement of the left renal artery,   Prior to this appt, she underwent a renal duplex and I have reviewed the results of her testing with her.  Since I saw her last, she is now being followed [facility] and was started on Jardience and Ozempic.  She believes her weight is down 13 lbs.  Her glucose levels have all been normal and her BP is markedly improved.  She denies any further complaints of chest pain, shortness of breath, palpitations, lower extremity edema or dizziness.       Social HX  Social History     Tobacco Use    Smoking status: Never     Passive exposure: Never    Smokeless tobacco: Never   Substance Use Topics    Alcohol use: Not Currently    Drug use: Never          Family HX  Family History   Problem Relation Name Age of Onset    Prostate cancer Father      Diabetes type II Father      Breast cancer Sister          2 sisters now with breast cancer both post menopausal.    Diabetes type II Sister      Breast cancer Sister  62    Other (acute myocardial infraction) Brother  59        2021    Other (herion addiction) Daughter      Other (alcohol dependence) Daughter      Drug abuse Daughter      No Known Problems Father's Brother      Other (Large b cell lymphoma of lymph nodes on neck) Maternal Grandfather      Other (diffuse large b cell lymphoma of lymph nodes of neck) Half-Sister            Review Of Systems   Constitutional: not feeling tired.   Eyes: no eyesight problems.   ENT: no hearing loss and no nosebleeds.   Cardiovascular: no intermittent leg claudication, no chest pain, no tightness or heavy pressure, no shortness of breath, no palpitations, no lower extremity edema, the heart rate was not slow, the heart rate was not fast and as noted in HPI.   Respiratory: no chronic cough and no shortness of breath.    Gastrointestinal: no change in bowel habits and no blood in stools.   Genitourinary: no urinary frequency.   Skin: no skin rashes.   Neurological: no seizures and no frequent falls.   Psychiatric: no depression and not suicidal.   All other systems have been reviewed and are negative for complaint.        Allergies  Allergies   Allergen Reactions    Propoxyphene N-Acetaminophen Drowsiness    Adhesive Tape-Silicones Other     Steristrips - takes skin off    Cefaclor Palpitations    Latex Rash          Vitals  There were no vitals taken for this visit.        Physical Exam  Constitutional: alert and in no acute distress.   Eyes: no erythema, swelling or discharge from the eye .   Neck: neck is supple, symmetric, trachea midline, no masses  and no thyromegaly .   Pulmonary: no increased work of breathing or signs of respiratory distress  and lungs clear to auscultation.  Auscultation of the lungs revealed no expiratory wheezing, normal expiratory time and no inspiratory wheezing. no rales or crackles were heard bilaterally. no rhonchi. no friction rub. no wheezing. no diminished breath sounds. no bronchial breath sounds.   Cardiovascular: carotid pulses 2+ bilaterally with no bruit  right carotid pulse 2+, no bruit heard over the right carotid,   Left carotid pulse 2+ and no bruit heard over the left carotid,   JVP was normal, no thrills ,   Regular rhythm, normal S1 and S2, no murmurs  the heart rate was normal normal S1, normal S2, no S3, no S4 no murmurs were heard.,   pedal pulses 2+ bilaterally  and no edema .   Abdomen: abdomen non-tender, no masses  and no hepatomegaly .   Skin: skin warm and dry, normal skin turgor .   Psychiatric judgment and insight is normal  and oriented to person, place and time .            Current/Home Meds    Current Outpatient Medications:     amLODIPine (Norvasc) 10 mg tablet, Take 1 tablet (10 mg) by mouth once daily., Disp: , Rfl:     aspirin 81 mg EC tablet, Take 1 tablet (81 mg)  by mouth once daily., Disp: , Rfl:     blood sugar diagnostic strip, 1 strip 3 times a day. ONE TOUCH ULTRA 2 TEST STRIPS, Disp: 270 strip, Rfl: 3    carvedilol (Coreg) 25 mg tablet, Take 1 tablet (25 mg) by mouth 2 times a day with meals., Disp: , Rfl:     cholecalciferol (Vitamin D-3) 25 MCG (1000 UT) tablet, Take 1 tablet (25 mcg) by mouth once daily., Disp: , Rfl:     clobetasol (Temovate) 0.05 % external solution, apply to the nail beds once nightly for 3 weeks then 2 times per week for maintenance thereafter if needed, Disp: , Rfl:     cyanocobalamin, vitamin B-12, 1,000 mcg tablet, sublingual, Place under the tongue. one tab sublingual daily five days per week, monday thru friday, Disp: , Rfl:     diabetic supplies, miscellan. misc, Onetouch ultrasoft lancets misc, Disp: , Rfl:     diclofenac sodium 1 % kit, apply 2 grams, 2-3 times per day to right knee for, Disp: , Rfl:     empagliflozin (Jardiance) 25 mg, TAKE 1 TABLET EVERY MORNING, Disp: 30 tablet, Rfl: 11    ezetimibe (Zetia) 10 mg tablet, Take 1 tablet (10 mg) by mouth once daily. DAILY, Disp: 90 tablet, Rfl: 1    fluvastatin (Lescol) 40 mg capsule, Take 1 capsule (40 mg) by mouth once daily at bedtime., Disp: 90 capsule, Rfl: 1    FreeStyle Cory sensor system (FreeStyle Cory 2 Sensor) kit, Use as instructed, Disp: 1 each, Rfl: 0    hydroCHLOROthiazide (HYDRODiuril) 25 mg tablet, Take 1 tablet (25 mg) by mouth 1 time. DAILY, Disp: , Rfl:     insulin glargine (Lantus Solostar U-100 Insulin) 100 unit/mL (3 mL) pen, Inject 20 Units under the skin once daily at bedtime. Take as directed per insulin instructions., Disp: 30 mL, Rfl: 1    insulin lispro (HumaLOG) 100 unit/mL injection, Inject insulin in divided doses at breakfast, lunch, and dinner. Total daily dose 75 units., Disp: 72 each, Rfl: 3    lancets (MICROLET LANCET MISC), Test sugar 6 times a day, Disp: , Rfl:     loratadine (Claritin Reditabs) 10 mg disintegrating tablet, Take 1 tablet (10 mg)  by mouth once daily., Disp: , Rfl:     MAGNESIUM CITRATE ORAL, Take 1 tablet by mouth at bedtime. MAGNESIUM CITRATE 200 MG, Disp: , Rfl:     meclizine (Antivert) 12.5 mg tablet, Take 1 tablet (12.5 mg) by mouth once every 24 hours., Disp: , Rfl:     omega-3/dha/epa/fish oil (OMEGA-3 ORAL), Take 1 capsule by mouth in the morning and at bedtime. 1,000 MG 2 TIMES A DAY, Disp: , Rfl:     psyllium (Metamucil) 3.4 gram packet, Take by mouth at bedtime. Dissolve 1 tsp in 8 oz of water and drink daily at bedtime and not near other medication, forconstipation, Disp: , Rfl:     semaglutide (Ozempic) 0.25 mg or 0.5 mg (2 mg/3 mL) pen injector, INJECT 0.25MG UNDER THE SKIN ONCE WEEKLY, Disp: 3 mL, Rfl: 0             Cardiac Service Results: 11/1/2023  Renal Artery Duplex Right Kidney: 10.0 cm                           Left Kidney: 10.1 cm        Systolic        Diastolic     ARTERY            Systolic       Diastolic         59 cm/s         13 cm/s      Origin            113 cm/s        15 cm/s         67 cm/s         9 cm/s        Prox             144 cm/s       215 cm/s         67 cm/s         11 cm/s        Mid             257 cm/s        36 cm/s         59 cm/s         8 cm/s       Distal            90 cm/s         14 cm/s         16 cm/s         4 cm/s      Superior           19 cm/s         5 cm/s         14 cm/s         3 cm/s      Inferior           18 cm/s         5 cm/s                          Right                           Left                           1.1       R/A Ratio            4.4                           0.8    Resistive Index         0.7        Ao Dist Diam 1.40 cm  Mid Ao       59 cm/s        Assessment/Plan      LEFT RENAL ARTERY STENOSIS:  Although her left renal artery velocities are higher, the patient has had marked improvement in her BP.  She is now followed by [facility] and her weight is down 13 lbs, her glucose levels have been normal and she denies any complaints of chest pain or shortness of  breath.  Her BP today is 127/74.  She is to continue same dose of medications and I will have her under go a repeat renal duplex scan in 6 months and follow up with me at that time.

## 2023-12-12 ENCOUNTER — OFFICE VISIT (OUTPATIENT)
Dept: HEMATOLOGY/ONCOLOGY | Facility: CLINIC | Age: 78
End: 2023-12-12
Payer: MEDICARE

## 2023-12-12 VITALS
RESPIRATION RATE: 18 BRPM | DIASTOLIC BLOOD PRESSURE: 73 MMHG | WEIGHT: 164.02 LBS | SYSTOLIC BLOOD PRESSURE: 151 MMHG | OXYGEN SATURATION: 96 % | BODY MASS INDEX: 32.03 KG/M2 | HEART RATE: 73 BPM | TEMPERATURE: 97.3 F

## 2023-12-12 DIAGNOSIS — D89.89 KAPPA LIGHT CHAIN DISEASE (MULTI): ICD-10-CM

## 2023-12-12 DIAGNOSIS — D47.2 MGUS (MONOCLONAL GAMMOPATHY OF UNKNOWN SIGNIFICANCE): Primary | ICD-10-CM

## 2023-12-12 PROCEDURE — 3077F SYST BP >= 140 MM HG: CPT | Performed by: INTERNAL MEDICINE

## 2023-12-12 PROCEDURE — 1159F MED LIST DOCD IN RCRD: CPT | Performed by: INTERNAL MEDICINE

## 2023-12-12 PROCEDURE — 99213 OFFICE O/P EST LOW 20 MIN: CPT | Performed by: INTERNAL MEDICINE

## 2023-12-12 PROCEDURE — 3078F DIAST BP <80 MM HG: CPT | Performed by: INTERNAL MEDICINE

## 2023-12-12 PROCEDURE — 1160F RVW MEDS BY RX/DR IN RCRD: CPT | Performed by: INTERNAL MEDICINE

## 2023-12-12 PROCEDURE — 1036F TOBACCO NON-USER: CPT | Performed by: INTERNAL MEDICINE

## 2023-12-12 PROCEDURE — 1126F AMNT PAIN NOTED NONE PRSNT: CPT | Performed by: INTERNAL MEDICINE

## 2023-12-12 ASSESSMENT — ENCOUNTER SYMPTOMS
LOSS OF SENSATION IN FEET: 0
OCCASIONAL FEELINGS OF UNSTEADINESS: 0
DEPRESSION: 0

## 2023-12-12 ASSESSMENT — COLUMBIA-SUICIDE SEVERITY RATING SCALE - C-SSRS
1. IN THE PAST MONTH, HAVE YOU WISHED YOU WERE DEAD OR WISHED YOU COULD GO TO SLEEP AND NOT WAKE UP?: NO
6. HAVE YOU EVER DONE ANYTHING, STARTED TO DO ANYTHING, OR PREPARED TO DO ANYTHING TO END YOUR LIFE?: NO
2. HAVE YOU ACTUALLY HAD ANY THOUGHTS OF KILLING YOURSELF?: NO

## 2023-12-12 ASSESSMENT — PATIENT HEALTH QUESTIONNAIRE - PHQ9
2. FEELING DOWN, DEPRESSED OR HOPELESS: NOT AT ALL
SUM OF ALL RESPONSES TO PHQ9 QUESTIONS 1 AND 2: 0
1. LITTLE INTEREST OR PLEASURE IN DOING THINGS: NOT AT ALL

## 2023-12-12 ASSESSMENT — PAIN SCALES - GENERAL: PAINLEVEL: 0-NO PAIN

## 2023-12-12 NOTE — PROGRESS NOTES
Patient to follow-up in 6 months with labs prior outside.  Call back instructions reviewed.  Patient verbalized understanding.

## 2023-12-12 NOTE — PROGRESS NOTES
Patient ID: Eleonora Varela is a 78 y.o. female.  Referring Physician: Eyad Yi MD  [address] [facility]  [place] [address]  Primary Care Provider: Jennifer Kenney MD    ORDERS & PATIENT INSTRUCTIONS:        RTC 6 m  CBC, CMP,,S free light chains            ASSESSMENT, PROBLEM LIST, DECISION MAKING, PLAN.       1.MGUS with  Elevated serum kappa light chain but negative serum protein electrophoresis, negative 24-hour urine immunoelectrophoresis. Bone marrow aspirate and biopsy was negative other than mildly decreased M:E ratio  1.8:1, plasma cell 1%, normal flow cytometry and cytogenetics at [facility] in February 2017, negative for Amylodosis   2. Proteinuria most likely related to diabetic nephropathy.   3. Diabetes mellitus,   4. Diabetic retinopathy.   5. Hypertension.   6. History of carpal tunnel surgery.   Patient had a renal artery stent placed on May 9, 2022 at [facility]     INTERVAL HISTORY:   Patient returns today for follow-up on MGUS   Patient has been doing well denies any headache fever cough chest pain shortness of breath urinary symptoms, denies any new complaint.   denies any headache fever cough chest pain shortness of breath urinary symptoms    Patient had a renal artery stent placed on May 9, 2022 at [facility]     PHYSICAL EXAMINATION:    General: Conscious, alert, oriented x3, not in acute distress.  HEENT:    No icterus.    Chest:Bilateral symmetrical,     CVS:  S1, S2.    Abdomen:  Soft, no palpable mass   Extremities: No clubbing, cyanosis,     Skin: No petechial rash.    ASSESSMENT/PLAN:   MGUS, with mildly elevated serum free light chains   - She has 1% chance per year of progressing, does not require any further workup from hematology oncology standpoint and we will recommend watchful waiting and periodic lab works.  Her serum free light chain is reasonably stable, continue watchful waiting  Proteinuria, negative for  Bence-Gomez proteinuria, most likely related to diabetic nephropathy  Chronic renal insufficiency,    Patient had a renal artery stent placed on May 9, 2022 at [facility]     Her serum free light chain is overall stable, no evidence of progression of MGUS    Continue watchful waiting and will return for follow-up in 6 months    Patient has a diabetic retinopathy for which she is receiving injections in the Lt eye    Diabetes mellitus and hypercalcemia, has been followed by Dr. Pavon under Cinema program and now on Jardiance and Ozempic         Return for follow-up in 6 months          VITALS:   1.77 meters squared /73   Pulse 73   Temp 36.3 °C (97.3 °F)   Resp 18   Wt 74.4 kg (164 lb 0.4 oz)   SpO2 96%   BMI 32.03 kg/m²     LABS:    CBC:  Recent Labs     12/04/23  0717 03/27/23  0820 09/16/22  0944 06/23/22  0913 04/27/22  0701   WBC 6.7 6.6 5.9 5.3 6.3   HGB 16.2* 14.4 14.4 14.8 13.8   HCT 48.1* 43.3 42.2 43.6 43.8    180 196 185 187   MCV 88 90 89 89 95       CMP:  Recent Labs     12/04/23  0717 10/14/23  0806 09/16/23  0900 05/19/23  0705 03/27/23  0820 05/12/21  0702 02/17/21  0829    139 139 140 137   < > 137   K 3.9 3.9 4.4 4.4 4.2   < > 4.0    98 100 102 100   < > 101   CO2 33* 30 33* 31 30   < > 29   ANIONGAP 10 15 10 11 11   < > 11   BUN 28* 22 21 24* 26*   < > 28*   CREATININE 1.49* 1.54* 1.45* 1.29* 1.33*   < > 1.46*   EGFR 36* 35*  --   --   --   --   --    MG  --  2.61*  --   --   --   --  2.08    < > = values in this interval not displayed.     Recent Labs     12/04/23  0717 09/16/23  0900 05/19/23  0705 03/27/23  0820 01/03/23  1607 09/16/22  0944   ALBUMIN 4.2 4.4 4.2 3.9 4.2 3.9   ALKPHOS 73 97 114 76  --  80   ALT 21 27 29 32  --  21   AST 22 22 24 22  --  18   BILITOT 1.0 1.0 0.9 0.9  --  0.8       HEME/ENDO:  Recent Labs     05/19/23  0705 04/27/22  0701 05/12/21  0702 07/25/20  0806 01/09/20  0755 09/13/19  0727   TSH 1.48 2.38 2.15 1.49  --  1.66  "  [id number] 770 >2000* 776  --   --  524   FOLATE  --   --   --   --  11.6  --         MICRO: No results for input(s): \"ESR\", \"CRP\", \"PROCAL\" in the last 39348 hours.  No results found for the last 90 days.        TUMOR MARKERS:  No results found for: \"LABCA2\", \"CEA\", \"\", \"PSA\", \"AFPTM\", \"HCGTM\", \"\"             IMAGING:         BI mammo bilateral screening tomosynthesis  Narrative: Interpreted By:  Tray Noble,   STUDY:  BI MAMMO BILATERAL SCREENING TOMOSYNTHESIS; 11/2/2023 9:06 am      ACCESSION NUMBER(S):  [id number]      ORDERING CLINICIAN:  MATTEO KEE      INDICATION:  Screening. Family history of breast cancer in 2 sisters.      COMPARISON:  10/23/2019, 10/28/2020, 10/29/2021, 10/31/2022      FINDINGS:  2D and tomosynthesis images were reviewed at 1 mm slice thickness.      Density:  There are areas of scattered fibroglandular tissue.      No suspicious masses or calcifications are identified.      Impression: No mammographic evidence of malignancy.      BI-RADS CATEGORY:      BI-RADS Category:  1 Negative.  Recommendation:  Routine Screening Mammogram in 1 Year.  Recommended Date:  1 Year.  Laterality:  Bilateral.                  For any future breast imaging appointments, please call [phone]).          MACRO:  None      Signed by: Tray Noble 11/7/2023 8:42 AM  Dictation workstation:   [id number]       Current Outpatient Medications   Medication Sig Dispense Refill    amLODIPine (Norvasc) 10 mg tablet Take 1 tablet (10 mg) by mouth once daily.      aspirin 81 mg EC tablet Take 1 tablet (81 mg) by mouth once daily.      blood sugar diagnostic strip 1 strip 3 times a day. ONE TOUCH ULTRA 2 TEST STRIPS 270 strip 3    carvedilol (Coreg) 25 mg tablet Take 1 tablet (25 mg) by mouth 2 times a day with meals. 180 tablet 3    cholecalciferol (Vitamin D-3) 25 MCG (1000 UT) tablet Take 1 tablet (25 mcg) by mouth once daily.      cyanocobalamin, vitamin B-12, 1,000 mcg tablet, sublingual " Place under the tongue. one tab sublingual daily five days per week, monday thru friday      diabetic supplies, miscellan. misc Onetouch ultrasoft lancets misc      empagliflozin (Jardiance) 25 mg TAKE 1 TABLET EVERY MORNING 30 tablet 11    ezetimibe (Zetia) 10 mg tablet Take 1 tablet (10 mg) by mouth once daily. DAILY 90 tablet 3    fluvastatin (Lescol) 40 mg capsule Take 1 capsule (40 mg) by mouth once daily at bedtime. 90 capsule 1    FreeStyle Cory sensor system (FreeStyle Cory 2 Sensor) kit Use as instructed 1 each 0    hydroCHLOROthiazide (HYDRODiuril) 25 mg tablet Take 1 tablet (25 mg) by mouth once daily. DAILY 90 tablet 3    insulin glargine (Lantus Solostar U-100 Insulin) 100 unit/mL (3 mL) pen Inject 20 Units under the skin once daily at bedtime. Take as directed per insulin instructions. (Patient taking differently: Inject 40 Units under the skin once daily in the morning. Take as directed per insulin instructions.) 30 mL 1    insulin lispro (HumaLOG) 100 unit/mL injection Inject insulin in divided doses at breakfast, lunch, and dinner. Total daily dose 75 units. 72 each 3    lancets (MICROLET LANCET MISC) Test sugar 6 times a day      loratadine (Claritin Reditabs) 10 mg disintegrating tablet Take 1 tablet (10 mg) by mouth once daily.      MAGNESIUM CITRATE ORAL Take 1 tablet by mouth at bedtime. MAGNESIUM CITRATE 200 MG      meclizine (Antivert) 12.5 mg tablet Take 1 tablet (12.5 mg) by mouth once every 24 hours.      omega-3/dha/epa/fish oil (OMEGA-3 ORAL) Take 1 capsule by mouth in the morning and at bedtime. 1,000 MG 2 TIMES A DAY      psyllium (Metamucil) 3.4 gram packet Take by mouth at bedtime. Dissolve 1 tsp in 8 oz of water and drink daily at bedtime and not near other medication, forconstipation      semaglutide (Ozempic) 0.25 mg or 0.5 mg (2 mg/3 mL) pen injector INJECT 0.25MG UNDER THE SKIN ONCE WEEKLY 3 mL 0     No current facility-administered medications for this visit.             FAMILY HISTORY:   Family History   Problem Relation Name Age of Onset    Prostate cancer Father      Diabetes type II Father      Breast cancer Sister          2 sisters now with breast cancer both post menopausal.    Diabetes type II Sister      Breast cancer Sister  62    Other (acute myocardial infraction) Brother  59            Other (herion addiction) Daughter      Other (alcohol dependence) Daughter      Drug abuse Daughter      No Known Problems Father's Brother      Other (Large b cell lymphoma of lymph nodes on neck) Maternal Grandfather      Other (diffuse large b cell lymphoma of lymph nodes of neck) Half-Sister          ALLERGY: Propoxyphene n-acetaminophen, Adhesive tape-silicones, Cefaclor, and Latex    SOCIAL HISTORY: She  reports that she does not currently use alcohol. She  reports no history of drug use.        ALLERGIES: Allergic to LATEX, DARVOCET, CECLOR, AND NEOSPORIN.   FAMILY HISTORY: Father had pancreatic cancer, sister had breast cancer, other sister had lymphoma, brother had testicular cancer, and mother  in childbirth.   SOCIAL HISTORY: Never smoked. Denies any alcohol or illicit drug use. (1)              Medication reviewed in e-chart  Patient is monitored for medication toxicity  labs reviewed and interpreted independently, X rays independently reviewed  Notes from other physicians involved in care were reviewed    Charting was completed using voice recognition technology and may include unintended errors.    CAESAR BROWN MD, ANGELA.    Obed Boyd Master Clinician in Hematology and Oncology  Medical Director, [facility] at [facility].  [place] office  Phone [phone]  Fax      [phone]  [facility] /[place].  Phone [phone]  Fax     [phone]

## 2023-12-16 ENCOUNTER — LAB (OUTPATIENT)
Dept: LAB | Facility: LAB | Age: 78
End: 2023-12-16
Payer: MEDICARE

## 2023-12-16 DIAGNOSIS — E11.21 DIABETIC NEPHROPATHY ASSOCIATED WITH TYPE 2 DIABETES MELLITUS (MULTI): ICD-10-CM

## 2023-12-16 DIAGNOSIS — I10 PRIMARY HYPERTENSION: ICD-10-CM

## 2023-12-16 DIAGNOSIS — Z79.4 TYPE 2 DIABETES MELLITUS WITH INSULIN THERAPY (MULTI): ICD-10-CM

## 2023-12-16 DIAGNOSIS — N18.32 STAGE 3B CHRONIC KIDNEY DISEASE (MULTI): ICD-10-CM

## 2023-12-16 DIAGNOSIS — E11.9 TYPE 2 DIABETES MELLITUS WITH INSULIN THERAPY (MULTI): ICD-10-CM

## 2023-12-16 PROCEDURE — 83036 HEMOGLOBIN GLYCOSYLATED A1C: CPT

## 2023-12-16 PROCEDURE — 36415 COLL VENOUS BLD VENIPUNCTURE: CPT

## 2023-12-16 PROCEDURE — 82043 UR ALBUMIN QUANTITATIVE: CPT

## 2023-12-16 PROCEDURE — 82570 ASSAY OF URINE CREATININE: CPT

## 2023-12-16 PROCEDURE — 83735 ASSAY OF MAGNESIUM: CPT

## 2023-12-16 PROCEDURE — 80053 COMPREHEN METABOLIC PANEL: CPT

## 2023-12-16 PROCEDURE — 80061 LIPID PANEL: CPT

## 2023-12-17 LAB
ALBUMIN SERPL BCP-MCNC: 4.5 G/DL (ref 3.4–5)
ALP SERPL-CCNC: 70 U/L (ref 33–136)
ALT SERPL W P-5'-P-CCNC: 18 U/L (ref 7–45)
ANION GAP SERPL CALC-SCNC: 15 MMOL/L (ref 10–20)
AST SERPL W P-5'-P-CCNC: 18 U/L (ref 9–39)
BILIRUB SERPL-MCNC: 1.2 MG/DL (ref 0–1.2)
BUN SERPL-MCNC: 29 MG/DL (ref 6–23)
CALCIUM SERPL-MCNC: 10 MG/DL (ref 8.6–10.6)
CHLORIDE SERPL-SCNC: 99 MMOL/L (ref 98–107)
CHOLEST SERPL-MCNC: 185 MG/DL (ref 0–199)
CHOLESTEROL/HDL RATIO: 3.8
CO2 SERPL-SCNC: 30 MMOL/L (ref 21–32)
CREAT SERPL-MCNC: 1.59 MG/DL (ref 0.5–1.05)
CREAT UR-MCNC: 95.7 MG/DL (ref 20–320)
EST. AVERAGE GLUCOSE BLD GHB EST-MCNC: 143 MG/DL
GFR SERPL CREATININE-BSD FRML MDRD: 33 ML/MIN/1.73M*2
GLUCOSE SERPL-MCNC: 233 MG/DL (ref 74–99)
HBA1C MFR BLD: 6.6 %
HDLC SERPL-MCNC: 49.3 MG/DL
LDLC SERPL CALC-MCNC: 102 MG/DL
MICROALBUMIN UR-MCNC: 13 MG/L
MICROALBUMIN/CREAT UR: 13.6 UG/MG CREAT
NON HDL CHOLESTEROL: 136 MG/DL (ref 0–149)
POTASSIUM SERPL-SCNC: 4.3 MMOL/L (ref 3.5–5.3)
PROT SERPL-MCNC: 7.4 G/DL (ref 6.4–8.2)
SODIUM SERPL-SCNC: 140 MMOL/L (ref 136–145)
TRIGL SERPL-MCNC: 171 MG/DL (ref 0–149)
VLDL: 34 MG/DL (ref 0–40)

## 2023-12-17 NOTE — PROGRESS NOTES
Subjective   Patient ID: Eleonora Varela is a 78 y.o. female who presents for Follow-up (Patient here for follow up, no new concerns at this time.).  LAST VISIT PLAN 10/2/23  Instructions    Flu shot today.  Covid vaccine soon.  Then RSV vaccine.     Blood test not fasting in 2 -3 weeks and then again in mid December.     Follow up December 18  please do the blood test before the appt.        END INFO FROM PRIOR VISIT  HPI    A1c is 6.6!!! Is on jardiance and ozempic. Since sept.  Has had some lows, usually 2 hours after shot and swim day as has apple juice with her to help  She only does 10 units humalog with meals on swim mornings.    Occ in the afternoon 330-4 either beeper goes off or fingers get numb she is on top of it as far as dealing with her sugars and watching that she does not get too low.   We discussed A1c is great and do not want it any lower.  She sees dr smith tomorrow for multidisciplinary [facility] team follow up.  Saw dr pierre cardio and 6 months and still with stanley kiran for vascular and 6 months. She was told her renal artery stent is working must have kinked artery as bp fluctuates but stent is open and no procedure now. Renal artery stent.    Mgus is stable and 6 month follow up. Dr steven johnson had flu rsv and covid and is utd with pneumonia.    Stress today:Dtr getting  from son eduard who is dad of her granddtr and found out this am.so bp is up      Scribe Transcription:  Her last A1c was 6.6. She has had some low sugars particularly if she has a swim day so she takes apple juice with her. They’ve adjusted it down to 10 units before swim days. Occasionally around 3-4 pm she will get hypoglycemia symptoms such as tingling fingers.     SGLT-2 inhibitors (Farxiga, Jardiance, Invokana): No hematuria, dysuria, or yeast infections.   GLP-1 agonists (Trulicity, Victoza, Ozempic, Wegovy, Rybelsus, Bydureon): No nausea, vomiting, abdominal pain and anterior neck pain/swelling.      She saw Dr. Cook and will f/u in 6 months. She saw Dr. Salazar and will f/u in 3 months.     Her kidney function is stable.     She states her daughter and son-in-law are getting a divorce due to him having an affair.  and she just heard that phone call on the way to her visit today so she has been stressed about it.     Scribe Attestation  By signing my name below, I, Boyd Strange, Sukhdev   attest that this documentation has been prepared under the direction and in the presence of Jennifer Kenney MD.   Review of Systems  Neg cardio resp and sglt2 and glp 1 questions  Current Outpatient Medications   Medication Instructions    amLODIPine (Norvasc) 10 mg tablet 1 tablet, oral, Daily    aspirin 81 mg EC tablet 1 tablet, oral, Daily    blood sugar diagnostic strip 1 strip, miscellaneous, 3 times daily, ONE TOUCH ULTRA 2 TEST STRIPS    carvedilol (COREG) 25 mg, oral, 2 times daily with meals    cholecalciferol (VITAMIN D-3) 25 mcg, oral, Daily    cyanocobalamin, vitamin B-12, 1,000 mcg tablet, sublingual sublingual, one tab sublingual daily five days per week, monday thru friday    diabetic supplies, miscellan. misc miscellaneous, Onetouch ultrasoft lancets misc    empagliflozin (Jardiance) 25 mg TAKE 1 TABLET EVERY MORNING    ezetimibe (ZETIA) 10 mg, oral, Daily, DAILY    fluvastatin (LESCOL) 40 mg, oral, Nightly    FreeStyle Cory sensor system (FreeStyle Cory 2 Sensor) kit Use as instructed    hydroCHLOROthiazide (HYDRODIURIL) 25 mg, oral, Daily, DAILY    insulin glargine (LANTUS SOLOSTAR U-100 INSULIN) 20 Units, subcutaneous, Nightly, Take as directed per insulin instructions.    insulin lispro (HumaLOG) 100 unit/mL injection Inject insulin in divided doses at breakfast, lunch, and dinner. Total daily dose 75 units.    lancets (MICROLET LANCET MISC) miscellaneous, Test sugar 6 times a day    loratadine (CLARITIN REDITABS) 10 mg, oral, Daily    MAGNESIUM CITRATE ORAL 1 tablet, oral, Nightly, MAGNESIUM  CITRATE 200 MG    meclizine (ANTIVERT) 12.5 mg, oral, Every 24 hours    omega-3/dha/epa/fish oil (OMEGA-3 ORAL) 1 capsule, oral, 2 times daily, 1,000 MG 2 TIMES A DAY    psyllium (Metamucil) 3.4 gram packet oral, Nightly, Dissolve 1 tsp in 8 oz of water and drink daily at bedtime and not near other medication, forconstipation    semaglutide (Ozempic) 0.25 mg or 0.5 mg (2 mg/3 mL) pen injector INJECT 0.25MG UNDER THE SKIN ONCE WEEKLY     Allergies   Allergen Reactions    Propoxyphene N-Acetaminophen Drowsiness    Adhesive Tape-Silicones Other     Steristrips - takes skin off    Cefaclor Palpitations    Latex Rash     Objective   Lab Results   Component Value Date    WBC 6.7 12/04/2023    HGB 16.2 (H) 12/04/2023    HCT 48.1 (H) 12/04/2023     12/04/2023    CHOL 185 12/16/2023    TRIG 171 (H) 12/16/2023    HDL 49.3 12/16/2023    ALT 18 12/16/2023    AST 18 12/16/2023     12/16/2023    K 4.3 12/16/2023    CL 99 12/16/2023    CREATININE 1.59 (H) 12/16/2023    BUN 29 (H) 12/16/2023    CO2 30 12/16/2023    TSH 1.48 05/19/2023    INR 0.9 05/09/2022    HGBA1C 6.6 (H) 12/16/2023   Contains abnormal data Comprehensive Metabolic Panel  Order: [id number]  Status: Final result       Visible to patient: Yes (not seen)       Dx: MGUS (monoclonal gammopathy of unknow...    0 Result Notes   important suggestion  Newer results are available. Click to view them now.                 Component  Ref Range & Units 2 wk ago  (12/4/23) 2 mo ago  (10/14/23) 3 mo ago  (9/16/23) 7 mo ago  (5/19/23) 8 mo ago  (3/27/23) 11 mo ago  (1/3/23) 11 mo ago  (1/3/23)   Glucose  74 - 99 mg/dL 211 High  216 High  252 High  305 High  231 High   435 High    Sodium  136 - 145 mmol/L 140 139 139 140 137  137   Potassium  3.5 - 5.3 mmol/L 3.9 3.9 4.4 4.4 4.2  5.1   Chloride  98 - 107 mmol/L 101 98 100 102 100  99   Bicarbonate  21 - 32 mmol/L 33 High  30 33 High  31 30  29   Anion Gap  10 - 20 mmol/L 10 15 10 11 11  14   Urea Nitrogen  6 - 23 mg/dL  28 High  22 21 24 High  26 High   21   Creatinine  0.50 - 1.05 mg/dL 1.49 High  1.54 High  1.45 High  1.29 High  1.33 High   1.55 High    eGFR  >60 mL/min/1.73m*2 36 Low  35 Low  CM        Comment: Calculations of estimated GFR are performed using the 2021 CKD-EPI Study Refit equation without the race variable for the IDMS-Traceable creatinine methods.  https://jasn.asnjournals.org/content/early/2021/09/22/ASN.6539131829   Calcium  8.6 - 10.6 mg/dL 9.9 9.8 9.6 9.9 9.4  9.4   Albumin  3.4 - 5.0 g/dL 4.2  4.4 4.2 3.9  4.2   Alkaline Phosphatase  33 - 136 U/L 73  97 114 76     Total Protein  6.4 - 8.2 g/dL 6.7  6.9 6.7 6.5 6.3 Low     AST  9 - 39 U/L 22 22 24 22     Bilirubin, Total  0.0 - 1.2 mg/dL 1.0  1.0 0.9 0.9     ALT  7 - 45 U/L 21  27 CM 29 CM 32 CM          Component  Ref Range & Units 2 wk ago  (12/4/23) 8 mo ago  (3/27/23) 1 yr ago  (9/16/22) 1 yr ago  (6/23/22) 1 yr ago  (4/27/22) 1 yr ago  (3/10/22) 2 yr ago  (5/12/21)   WBC  4.4 - 11.3 x10*3/uL 6.7 6.6 R 5.9 R 5.3 R 6.3 R 6.6 R 5.0 R   nRBC  0.0 - 0.0 /100 WBCs 0.0 0.0 R 0.0 R 0.0 R 0.0 R 0.0 R 0.0 R   RBC  4.00 - 5.20 x10*6/uL 5.49 High  4.80 R 4.72 R 4.91 R 4.59 R 4.85 R 4.41 R   Hemoglobin  12.0 - 16.0 g/dL 16.2 High  14.4 14.4 14.8 13.8 14.6 13.5   Hematocrit  36.0 - 46.0 % 48.1 High  43.3 42.2 43.6 43.8 43.8 39.8   MCV  80 - 100 fL 88 90 89 89 95 90 90   MCH  26.0 - 34.0 pg 29.5         MCHC  32.0 - 36.0 g/dL 33.7 33.3 34.1 33.9 31.5 Low  33.3 33.9   RDW  11.5 - 14.5 % 11.7 11.8 12.0 12.0 12.0 11.9 12.2   Platelets  150 - 450 x10*3/uL 208 180 R 196 R 185 R 187 R 201 R 194 R   Neutrophils %  40.0 - 80.0 % 53.9 54.4 57.0  53.3 60.1    Immature Granulocytes %, Automated  0.0 - 0.9 % 0.1 0.3 CM 0.2 CM  0.2 CM 0.2 CM    Comment: Immature Granulocyte Count (IG) includes promyelocytes, myelocytes and metamyelocytes but does not include bands. Percent differential counts (%) should be interpreted in the context of the absolute cell counts (cells/UL).  "  Lymphocytes %  13.0 - 44.0 % 35.5 35.4 32.7  36.5 29.9    Monocytes %  2.0 - 10.0 % 7.4 6.1 6.7  7.3 6.8    Eosinophils %  0.0 - 6.0 % 2.5 3.2 2.7  2.2 2.4    Basophils %  0.0 - 2.0 % 0.6 0.6 0.7  0.5 0.6    Neutrophils Absolute  1.60 - 5.50 x10*3/uL 3.62 3.60 R 3.38 R  3.36 R 3.96 R    Comment: Percent differential counts (%) should be interpreted in the context of the absolute cell counts (cells/uL).   Immature Granulocytes Absolute, Automated  0.00 - 0.50 x10*3/uL 0.01         Lymphocytes Absolute  0.80 - 3.00 x10*3/uL 2.39 2.34 R 1.94 R  2.30 R 1.97 R    Monocytes Absolute  0.05 - 0.80 x10*3/uL 0.50 0.40 R 0.40 R  0.46 R 0.45 R    Eosinophils Absolute  0.00 - 0.40 x10*3/uL 0.17 0.21 R 0.16 R  0.14 R 0.16 R    Basophils Absolute  0.00 - 0.10 x10*3/uL 0.04 0.04 R 0.04 R  0.03 R 0.04 R    Resulting Agency [facility]          Physical ExamBP 146/74   Pulse 77   Ht 1.511 m (4' 11.5\")   Wt 73.2 kg (161 lb 6.4 oz)   BMI 32.05 kg/m²       6/21/2023     9:04 AM 10/2/2023     8:37 AM 10/24/2023    12:06 PM 12/5/2023     8:57 AM 12/12/2023     1:52 PM 12/18/2023    10:32 AM 12/18/2023    11:48 AM   Vitals   Systolic 168 132 127 127 151 146 134   Diastolic 93 54 52 74 73 74 68   Heart Rate 63 72 74 74 73 77 60   Temp     36.3 °C (97.3 °F)     Resp  18   18     Height (in) 1.524 m (5') 1.499 m (4' 11\") 1.524 m (5') 1.524 m (5')  1.511 m (4' 11.5\")    Weight (lb) 165 168 166 161.8 164.02 161.4    BMI 32.22 kg/m2 33.93 kg/m2 32.42 kg/m2 31.6 kg/m2 32.03 kg/m2 32.05 kg/m2    BSA (m2) 1.78 m2 1.78 m2 1.79 m2 1.76 m2 1.77 m2 1.75 m2    Visit Report  Report Report Report Report Report Report       Patient looks well and is in no obvious distress.  HEENT:   Normocephalic, no facial asymmetry  Eyes: Sclera and conjunctiva are clear.  Neck: No adenopathy cervical (Ant/post/lat), no Supraclavicular nodes.   Thyroid normal.   Carotid pulses normal, no carotid bruits.  Lungs : RR normal. Clear to " auscultation anterior, posterior and lateral. No rales, wheezes rhonchi or rubs. Good air exchange.  Heart: RRR. 1/6 systolic Murmur, ursb no change, no gallop, click or rub.  Abdomen: Bowel sounds normal, No bruits. No pulsatile mass. No hepatosplenomegaly, masses or tenderness. Soft, no guarding.  Vascular:  Posterior tibialis and dorsalis pedis pulses within normal limits bilaterally.   Extremities: no upper extremity edema. No lower extremity edema.   Musculoskeletal: no synovitis of joints seen. No new deformity.  Neuro: CN 2-12 intact. Alert, appropriate.   Ambulates independently.  No gross motor deficit.   No tremors.  Psych: normal mood and affect.  Skin: No rash, bruising petechiae or jaundice.    Diabetic foot exam:  Skin normal no lesions. Has brawny discoloration of feet not new, feet are warm.  DP and PT pulses within normal limits.  Callus: none  Deformities: none.  Monofilament testing: Normal.     Eleonora was seen today for follow-up.  Diagnoses and all orders for this visit:  Stage 3b chronic kidney disease (CMS/[facility]) (Primary)  -     Renal function panel; Future  -     Magnesium; Future  Primary hypertension  -     ezetimibe (Zetia) 10 mg tablet; Take 1 tablet (10 mg) by mouth once daily. DAILY  -     amLODIPine (Norvasc) 10 mg tablet; Take 1 tablet (10 mg) by mouth once daily.  -     Magnesium; Future  History of stent insertion of renal artery  -     carvedilol (Coreg) 25 mg tablet; Take 1 tablet (25 mg) by mouth 2 times a day with meals.  -     hydroCHLOROthiazide (HYDRODiuril) 25 mg tablet; Take 1 tablet (25 mg) by mouth once daily. DAILY  Hyperlipidemia associated with type 2 diabetes mellitus (CMS/[facility])  -     fluvastatin (Lescol) 40 mg capsule; Take 1 capsule (40 mg) by mouth once daily at bedtime.  Type 2 diabetes mellitus with insulin therapy (CMS/[facility])  -     Hemoglobin A1c; Future  Chronic neck pain  -     Referral to Massage Therapy; Future      She is doing well, better. She is watching  her hypoglycemia -has hx of same x years related to her swimming activities.  Blood tests stable,   Recommend you get Dr Palacios's blood and urine test as ordered for your January appt. There: PTH, Vitamin D, urine albumin testing and renal panel.    Blood test for A1c and bmp after march 18 but before April 18. Orders are in.    Follow up every 3 months, set appointments for 2024. One of them would be your wellness visit and annual physical.

## 2023-12-18 ENCOUNTER — OFFICE VISIT (OUTPATIENT)
Dept: PRIMARY CARE | Facility: CLINIC | Age: 78
End: 2023-12-18
Payer: MEDICARE

## 2023-12-18 VITALS
HEIGHT: 60 IN | WEIGHT: 161.4 LBS | BODY MASS INDEX: 31.69 KG/M2 | DIASTOLIC BLOOD PRESSURE: 68 MMHG | HEART RATE: 60 BPM | SYSTOLIC BLOOD PRESSURE: 134 MMHG

## 2023-12-18 DIAGNOSIS — Z98.890 HISTORY OF STENT INSERTION OF RENAL ARTERY: ICD-10-CM

## 2023-12-18 DIAGNOSIS — M54.2 CHRONIC NECK PAIN: Primary | ICD-10-CM

## 2023-12-18 DIAGNOSIS — Z79.4 TYPE 2 DIABETES MELLITUS WITH INSULIN THERAPY (MULTI): ICD-10-CM

## 2023-12-18 DIAGNOSIS — E78.5 HYPERLIPIDEMIA ASSOCIATED WITH TYPE 2 DIABETES MELLITUS (MULTI): ICD-10-CM

## 2023-12-18 DIAGNOSIS — N18.32 STAGE 3B CHRONIC KIDNEY DISEASE (MULTI): ICD-10-CM

## 2023-12-18 DIAGNOSIS — E11.69 HYPERLIPIDEMIA ASSOCIATED WITH TYPE 2 DIABETES MELLITUS (MULTI): ICD-10-CM

## 2023-12-18 DIAGNOSIS — I10 PRIMARY HYPERTENSION: ICD-10-CM

## 2023-12-18 DIAGNOSIS — E11.9 TYPE 2 DIABETES MELLITUS WITH INSULIN THERAPY (MULTI): ICD-10-CM

## 2023-12-18 DIAGNOSIS — G89.29 CHRONIC NECK PAIN: Primary | ICD-10-CM

## 2023-12-18 LAB — MAGNESIUM SERPL-MCNC: 2.67 MG/DL (ref 1.6–2.4)

## 2023-12-18 PROCEDURE — 1036F TOBACCO NON-USER: CPT | Performed by: INTERNAL MEDICINE

## 2023-12-18 PROCEDURE — 3078F DIAST BP <80 MM HG: CPT | Performed by: INTERNAL MEDICINE

## 2023-12-18 PROCEDURE — 3075F SYST BP GE 130 - 139MM HG: CPT | Performed by: INTERNAL MEDICINE

## 2023-12-18 PROCEDURE — 1126F AMNT PAIN NOTED NONE PRSNT: CPT | Performed by: INTERNAL MEDICINE

## 2023-12-18 PROCEDURE — 1160F RVW MEDS BY RX/DR IN RCRD: CPT | Performed by: INTERNAL MEDICINE

## 2023-12-18 PROCEDURE — 1159F MED LIST DOCD IN RCRD: CPT | Performed by: INTERNAL MEDICINE

## 2023-12-18 PROCEDURE — 99214 OFFICE O/P EST MOD 30 MIN: CPT | Performed by: INTERNAL MEDICINE

## 2023-12-18 RX ORDER — HYDROCHLOROTHIAZIDE 25 MG/1
25 TABLET ORAL DAILY
Qty: 90 TABLET | Refills: 3 | Status: SHIPPED | OUTPATIENT
Start: 2023-12-18 | End: 2024-03-04 | Stop reason: DRUGHIGH

## 2023-12-18 RX ORDER — AMLODIPINE BESYLATE 10 MG/1
10 TABLET ORAL DAILY
Qty: 90 TABLET | Refills: 3 | Status: SHIPPED | OUTPATIENT
Start: 2023-12-18 | End: 2024-06-05 | Stop reason: SDUPTHER

## 2023-12-18 RX ORDER — FLUVASTATIN 40 MG/1
40 CAPSULE ORAL NIGHTLY
Qty: 90 CAPSULE | Refills: 1 | Status: SHIPPED | OUTPATIENT
Start: 2023-12-18 | End: 2024-04-15 | Stop reason: SDUPTHER

## 2023-12-18 RX ORDER — CARVEDILOL 25 MG/1
25 TABLET ORAL
Qty: 180 TABLET | Refills: 1 | Status: SHIPPED | OUTPATIENT
Start: 2023-12-18 | End: 2024-12-17

## 2023-12-18 RX ORDER — EZETIMIBE 10 MG/1
10 TABLET ORAL DAILY
Qty: 90 TABLET | Refills: 3 | Status: SHIPPED | OUTPATIENT
Start: 2023-12-18 | End: 2024-12-17

## 2023-12-18 ASSESSMENT — PATIENT HEALTH QUESTIONNAIRE - PHQ9
1. LITTLE INTEREST OR PLEASURE IN DOING THINGS: NOT AT ALL
SUM OF ALL RESPONSES TO PHQ9 QUESTIONS 1 AND 2: 0
2. FEELING DOWN, DEPRESSED OR HOPELESS: NOT AT ALL

## 2023-12-18 ASSESSMENT — ENCOUNTER SYMPTOMS
LOSS OF SENSATION IN FEET: 0
DEPRESSION: 0
OCCASIONAL FEELINGS OF UNSTEADINESS: 0

## 2023-12-18 ASSESSMENT — PAIN SCALES - GENERAL: PAINLEVEL: 0-NO PAIN

## 2023-12-18 NOTE — PATIENT INSTRUCTIONS
Blood tests stable,   Recommend you get Dr Palacios's blood and urine test as ordered for your January appt. There: PTH, Vitamin D, urine albumin testing and renal panel.    Blood test for A1c and bmp after march 18 but before April 18. Orders are in.    Follow up every 3 months, set appointments for 2024. One of them would be your wellness visit and annual physical.

## 2023-12-19 ENCOUNTER — OFFICE VISIT (OUTPATIENT)
Dept: CARDIOLOGY | Facility: CLINIC | Age: 78
End: 2023-12-19
Payer: MEDICARE

## 2023-12-19 ENCOUNTER — NURSE ONLY (OUTPATIENT)
Dept: CARDIOLOGY | Facility: CLINIC | Age: 78
End: 2023-12-19

## 2023-12-19 VITALS
WEIGHT: 161 LBS | DIASTOLIC BLOOD PRESSURE: 75 MMHG | SYSTOLIC BLOOD PRESSURE: 185 MMHG | HEIGHT: 60 IN | HEART RATE: 68 BPM | OXYGEN SATURATION: 95 % | BODY MASS INDEX: 31.61 KG/M2

## 2023-12-19 DIAGNOSIS — Z79.4 TYPE 2 DIABETES MELLITUS WITH INSULIN THERAPY (MULTI): Primary | ICD-10-CM

## 2023-12-19 DIAGNOSIS — E11.9 TYPE 2 DIABETES MELLITUS WITH INSULIN THERAPY (MULTI): Primary | ICD-10-CM

## 2023-12-19 DIAGNOSIS — E78.49 OTHER HYPERLIPIDEMIA: ICD-10-CM

## 2023-12-19 DIAGNOSIS — I10 PRIMARY HYPERTENSION: ICD-10-CM

## 2023-12-19 DIAGNOSIS — E11.9 TYPE 2 DIABETES MELLITUS WITHOUT COMPLICATION, WITHOUT LONG-TERM CURRENT USE OF INSULIN (MULTI): ICD-10-CM

## 2023-12-19 PROCEDURE — 3077F SYST BP >= 140 MM HG: CPT | Performed by: INTERNAL MEDICINE

## 2023-12-19 PROCEDURE — 3078F DIAST BP <80 MM HG: CPT | Performed by: INTERNAL MEDICINE

## 2023-12-19 PROCEDURE — 1126F AMNT PAIN NOTED NONE PRSNT: CPT | Performed by: INTERNAL MEDICINE

## 2023-12-19 PROCEDURE — 1159F MED LIST DOCD IN RCRD: CPT | Performed by: INTERNAL MEDICINE

## 2023-12-19 PROCEDURE — 1036F TOBACCO NON-USER: CPT | Performed by: INTERNAL MEDICINE

## 2023-12-19 PROCEDURE — 99214 OFFICE O/P EST MOD 30 MIN: CPT | Performed by: INTERNAL MEDICINE

## 2023-12-19 PROCEDURE — 1160F RVW MEDS BY RX/DR IN RCRD: CPT | Performed by: INTERNAL MEDICINE

## 2023-12-19 RX ORDER — SEMAGLUTIDE 0.68 MG/ML
0.5 INJECTION, SOLUTION SUBCUTANEOUS
Qty: 3 ML | Refills: 1 | Status: SHIPPED | OUTPATIENT
Start: 2023-12-19 | End: 2024-02-23 | Stop reason: DRUGHIGH

## 2023-12-19 RX ORDER — INSULIN LISPRO 100 [IU]/ML
10 INJECTION, SOLUTION INTRAVENOUS; SUBCUTANEOUS
Qty: 10 ML | Refills: 9
Start: 2023-12-19 | End: 2024-12-18

## 2023-12-19 ASSESSMENT — PAIN SCALES - GENERAL: PAINLEVEL: 0-NO PAIN

## 2023-12-19 ASSESSMENT — ENCOUNTER SYMPTOMS
DEPRESSION: 0
LOSS OF SENSATION IN FEET: 0
OCCASIONAL FEELINGS OF UNSTEADINESS: 0

## 2023-12-19 NOTE — PROGRESS NOTES
[place] [facility] ([facility]) Note    PCP: Dr. Jennifer Kenney  Cardiologists: Dr. Ahsan Cook and Dr. Nj Bennett     Ms. Varela is a 79 yo F with hx as listed below who returns to [facility] for follow-up visit; last seen in 9/2023. Feels well overall and denies CP, SOB, dizziness, LH, LE edema, or palpitations. Excited that she hast lost 13 lbs in the interim and blood sugar control is a lot steadier than previously (less frequent lows with swimming; does 10 units humalog on swim or shot days to avoid hypoglycemia). Former teacher at [employer]. Eating healthily. CGM reviewed. No issues with ozempic injections or side effects from jardiance. -140s at home; high today in the office. Thrilled that HgbA1c 6.6%!!     PAST MEDICAL/SURGICAL HISTORY:  -CKD  -DM with retinopathy  -L renal artery stent for renal artery stenosis (2022): 60% stenosis on L followed by Dr. Bennett with surveillance US  -HTN  -DLD  -vertigo  -ASD vs PFO? (heart cath age 14)  -cataract surgery  -MGUS  -osteopenia  -carotid plaque without stenosis  -carpal tunnel surgery     PRIOR CARDIAC TESTING:  -Lexiscan (2023): no ischemia, EF 80%  -Lexiscan (2021): no ischemia  -CAC (2021): 797  -TTE (2015): EF 60-65%, impaired relaxation, aortic sclerosis, mild TR  -Treadmill stress ECG (2015): negative  -TTE (2012): EF 70%, impaired relaxation, mid-cavity obstruction 43mmHg with valsalva    Active Ambulatory Problems     Diagnosis Date Noted    Abnormal urine findings 01/20/2023    Adverse reaction to statin medication 01/20/2023    Atrial septal defect 01/20/2023    B12 nutritional deficiency 01/20/2023    Cervical radiculopathy 01/20/2023    Chronic constipation 01/20/2023    Stage 3b chronic kidney disease (CMS/HCC) 08/29/2014    Agatston CAC score, >400 01/20/2023    Coronary artery disease 01/20/2023    Dense breasts 01/20/2023    Ganglion of left wrist 01/20/2023    Hand muscle atrophy 01/20/2023     Herniated cervical disc 01/20/2023    Diabetic nephropathy associated with type 2 diabetes mellitus (CMS/[facility]) 01/20/2023    History of stent insertion of renal artery 01/20/2023    Hypertension 01/20/2023    Intervertebral disc disease 01/20/2023    Kappa light chain disease (CMS/[facility]) 01/20/2023    MGUS (monoclonal gammopathy of unknown significance) 01/20/2023    Diabetic retinopathy (CMS/[facility]) 01/20/2023    Moderate nonproliferative diabetic retinopathy of both eyes without macular edema associated with type 2 diabetes mellitus (CMS/[facility]) 01/20/2023    Murmur 01/20/2023    Neuralgia 01/20/2023    Neuropathy, 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    Osteopenia 02/23/2011    Proteinuria 01/20/2023    Renal artery stenosis (CMS/[facility]) 01/20/2023    Urethral polyp 01/20/2023    Verrucous lesion of skin 01/20/2023    Vitamin D deficiency 01/20/2023    Type 2 diabetes mellitus with insulin therapy (CMS/[facility]) 01/20/2023    History of colon polyps 01/20/2023    History of hyperkalemia 01/20/2023    Obesity, Class I, BMI 30-34.9 07/05/2020    Osteoarthritis 02/24/2023    Essential hypertension 02/23/2011    Monoclonal paraproteinemia 06/07/2023    Hyperlipidemia 02/23/2011    Axillary adenopathy 09/04/2023    Osteopenia of neck of left femur 09/04/2023    Aspirin long-term use 09/04/2023    Chest pain 09/04/2023     Resolved Ambulatory Problems     Diagnosis Date Noted    BPV (benign positional vertigo) 01/20/2023    Hyperlipidemia associated with type 2 diabetes mellitus (CMS/[facility]) 01/20/2023    Second degree burn of abdomen 01/20/2023    Situational anxiety 01/20/2023    Superficial bruising 01/20/2023    Vertigo 01/20/2023    Upper respiratory infection with cough and congestion 01/20/2023    Laryngitis 01/20/2023    Sore throat 04/08/2023    Strep pharyngitis 04/08/2023    Fever 04/08/2023    Osteoarthritis of spine with radiculopathy, cervical region 08/29/2014    Hyperparathyroidism due to renal insufficiency (CMS/[facility]) 06/07/2023     Burn 09/04/2023    Pain and swelling of right knee 09/04/2023    Retrosternal chest pain 09/04/2023    Right-sided chest pain 09/04/2023    Class 1 obesity due to excess calories with body mass index (BMI) of 30.0 to 30.9 in adult 09/04/2023    Laryngitis 09/04/2023     Past Medical History:   Diagnosis Date    Abnormal radiologic findings on diagnostic imaging of right kidney     Abnormal radiologic findings on diagnostic imaging of right kidney     Acute kidney failure, unspecified (CMS/HCC) 08/22/2020    Carpal tunnel syndrome, right upper limb 01/24/2017    Chest pain, unspecified 03/01/2022    Contact with and (suspected) exposure to covid-19 08/14/2021    Contact with and (suspected) exposure to other bacterial communicable diseases 02/10/2022    Elevation of levels of liver transaminase levels 11/29/2022    Encounter for gynecological examination (general) (routine) without abnormal findings 10/19/2015    Localized edema 08/15/2022    Nonspecific lymphadenitis, unspecified 07/02/2019    Other chest pain 05/10/2021    Other conditions influencing health status 02/11/2019    Other long term (current) drug therapy 12/03/2020    Other microscopic hematuria 02/01/2016    Other specified cardiac arrhythmias 07/31/2020    Other specified symptoms and signs involving the circulatory and respiratory systems 03/01/2022    Other symptoms and signs involving the musculoskeletal system 04/29/2014    Pain in right leg 02/05/2018    Personal history of colonic polyps 10/13/2014    Personal history of diseases of the skin and subcutaneous tissue 02/13/2019    Personal history of other diseases of the respiratory system 03/01/2022    Personal history of other drug therapy     Personal history of other drug therapy 10/20/2016    Personal history of other endocrine, nutritional and metabolic disease 11/29/2022    Personal history of other endocrine, nutritional and metabolic disease 11/29/2022    Pleurodynia 03/01/2022     Strain of unspecified muscle(s) and tendon(s) at lower leg level, right leg, initial encounter 12/05/2022    Toxic effect of venom of wasps, accidental (unintentional), initial encounter 08/10/2021    Type 2 diabetes mellitus with diabetic cataract (CMS/HCC) 03/10/2020     Family History:  Family History   Problem Relation Name Age of Onset    Prostate cancer Father      Diabetes type II Father      Breast cancer Sister          2 sisters now with breast cancer both post menopausal.    Diabetes type II Sister      Breast cancer Sister  62    Other (acute myocardial infraction) Brother  59        2021    Other (herion addiction) Daughter      Other (alcohol dependence) Daughter      Drug abuse Daughter      No Known Problems Father's Brother      Other (Large b cell lymphoma of lymph nodes on neck) Maternal Grandfather      Other (diffuse large b cell lymphoma of lymph nodes of neck) Half-Sister        Allergies:  Propoxyphene n-acetaminophen, Adhesive tape-silicones, Cefaclor, and Latex    Outpatient Medications:  Current Outpatient Medications   Medication Instructions    amLODIPine (NORVASC) 10 mg, oral, Daily    aspirin 81 mg EC tablet 1 tablet, oral, Daily    blood sugar diagnostic strip 1 strip, miscellaneous, 3 times daily, ONE TOUCH ULTRA 2 TEST STRIPS    carvedilol (COREG) 25 mg, oral, 2 times daily with meals    cholecalciferol (VITAMIN D-3) 25 mcg, oral, Daily    cyanocobalamin, vitamin B-12, 1,000 mcg tablet, sublingual sublingual, one tab sublingual daily five days per week, monday thru friday    diabetic supplies, miscellan. misc miscellaneous, Onetouch ultrasoft lancets misc    empagliflozin (Jardiance) 25 mg TAKE 1 TABLET EVERY MORNING    ezetimibe (ZETIA) 10 mg, oral, Daily, DAILY    fluvastatin (LESCOL) 40 mg, oral, Nightly    FreeStyle Cory sensor system (FreeStyle Cory 2 Sensor) kit Use as instructed    hydroCHLOROthiazide (HYDRODIURIL) 25 mg, oral, Daily, DAILY    insulin glargine (LANTUS  SOLOSTAR U-100 INSULIN) 20 Units, subcutaneous, Nightly, Take as directed per insulin instructions.    insulin lispro (HumaLOG) 100 unit/mL injection Inject insulin in divided doses at breakfast, lunch, and dinner. Total daily dose 75 units.    lancets (MICROLET LANCET MISC) miscellaneous, Test sugar 6 times a day    loratadine (CLARITIN REDITABS) 10 mg, oral, Daily    MAGNESIUM CITRATE ORAL 1 tablet, oral, Nightly, MAGNESIUM CITRATE 200 MG    meclizine (ANTIVERT) 12.5 mg, oral, Every 24 hours    omega-3/dha/epa/fish oil (OMEGA-3 ORAL) 1 capsule, oral, 2 times daily, 1,000 MG 2 TIMES A DAY    psyllium (Metamucil) 3.4 gram packet oral, Nightly, Dissolve 1 tsp in 8 oz of water and drink daily at bedtime and not near other medication, forconstipation    semaglutide (Ozempic) 0.25 mg or 0.5 mg (2 mg/3 mL) pen injector INJECT 0.25MG UNDER THE SKIN ONCE WEEKLY      Last Recorded Vitals:  Vitals:    12/19/23 1545   BP: (!) 185/75   BP Location: Right arm   Patient Position: Sitting   BP Cuff Size: Adult   Pulse: 68   SpO2: 95%   Weight: 73 kg (161 lb)   Height: 1.524 m (5')     Physical Exam  GENERAL: NAD  HEENT: no JVD  CV: RRR without m/r/g  PULM: CTAB  EXT: non-edematous bilateral lower extremities     Last Labs:  RENAL FUNCTION PANEL -   Lab Results   Component Value Date    GLUCOSE 233 (H) 12/16/2023     12/16/2023    K 4.3 12/16/2023    CL 99 12/16/2023    CO2 30 12/16/2023    ANIONGAP 15 12/16/2023    BUN 29 (H) 12/16/2023    CREATININE 1.59 (H) 12/16/2023    CALCIUM 10.0 12/16/2023    PHOS 3.7 01/03/2023    ALBUMIN 4.5 12/16/2023     CMP -  Lab Results   Component Value Date    CALCIUM 10.0 12/16/2023    PHOS 3.7 01/03/2023    PROT 7.4 12/16/2023    ALBUMIN 4.5 12/16/2023    AST 18 12/16/2023    ALT 18 12/16/2023    ALKPHOS 70 12/16/2023    BILITOT 1.2 12/16/2023     LIPID PANEL -   Lab Results   Component Value Date    CHOL 185 12/16/2023    TRIG 171 (H) 12/16/2023    HDL 49.3 12/16/2023    CHHDL 3.8  12/16/2023    LDLF 79 09/16/2023    VLDL 34 12/16/2023    NHDL 136 12/16/2023     HEMOGLOBIN A1c -  Lab Results   Component Value Date    HGBA1C 6.6 (H) 12/16/2023    HGBA1C 8.4 (A) 06/07/2023     Assessment/Plan   79 yo F with hx of DM, CKD, HTN, DLD, and L renal artery stent for renal artery stenosis (2022) with 60% stenosis on L followed by Dr. Bennett with surveillance US here for [facility] follow-up. Great improvement in blood sugar control!  HgbA1c 6.6%, , , Cr 1.6, LFTs fine. Will uptitrate ozempic to 0.5 mg weekly and decrease humalog to 10 units TID AC. Continue jardiance and lantus 40 units daily. Goal in the future as we uptitrate ozempic is to get off SSI and need even less basal insulin while achieving weight loss. Has CGM. BP controlled at home on amlodipine 10 mg daily, HCTZ 25 mg daily, and coreg 25 mg BID. LDL previously in the 70s and now up to 102 on ezetimibe 10 mg daily, omega 3, and fluvasatin 40 mg daily. Will continue to watch as not at goal for secondary prevention. If LDL remains elevated in the future, consider switching statin or [id number]. On ASA 81 mg daily given prior renal stent. Follows with Endovascular regarding elevated velocities in L renal artery with plan for repeat US in 6 months. RTC 3 months with labs prior.     Alma Pavon MD

## 2023-12-19 NOTE — PROGRESS NOTES
[facility] [place] [facility] ([facility]) Nursing Note    Name: Eleonora PATTERSON Avery    Referring Clinician: Pcp    Cardiometabolic Problem list: Elevated CAC, CAD, Diabetic nephropathy, retinopathy, HTN, HLD, T2DM, obesity, Murmur, Stage 3b CKD.         Past Medical History:  She has a past medical history of Abnormal radiologic findings on diagnostic imaging of right kidney, Abnormal radiologic findings on diagnostic imaging of right kidney, Acute kidney failure, unspecified (CMS/HCC) (08/22/2020), BPV (benign positional vertigo) (01/20/2023), Carpal tunnel syndrome, right upper limb (01/24/2017), Chest pain, unspecified (03/01/2022), Contact with and (suspected) exposure to covid-19 (08/14/2021), Contact with and (suspected) exposure to other bacterial communicable diseases (02/10/2022), Elevation of levels of liver transaminase levels (11/29/2022), Encounter for gynecological examination (general) (routine) without abnormal findings (10/19/2015), Localized edema (08/15/2022), Nonspecific lymphadenitis, unspecified (07/02/2019), Osteoarthritis of spine with radiculopathy, cervical region (08/29/2014), Other chest pain (05/10/2021), Other conditions influencing health status (02/11/2019), Other long term (current) drug therapy (12/03/2020), Other microscopic hematuria (02/01/2016), Other specified cardiac arrhythmias (07/31/2020), Other specified symptoms and signs involving the circulatory and respiratory systems (03/01/2022), Other symptoms and signs involving the musculoskeletal system (04/29/2014), Pain in right leg (02/05/2018), Personal history of colonic polyps (10/13/2014), Personal history of diseases of the skin and subcutaneous tissue (02/13/2019), Personal history of other diseases of the respiratory system (03/01/2022), Personal history of other drug therapy, Personal history of other drug therapy (10/20/2016), Personal history of other endocrine, nutritional and metabolic disease (11/29/2022), Personal history of other  endocrine, nutritional and metabolic disease (11/29/2022), Pleurodynia (03/01/2022), Strain of unspecified muscle(s) and tendon(s) at lower leg level, right leg, initial encounter (12/05/2022), Toxic effect of venom of wasps, accidental (unintentional), initial encounter (08/10/2021), and Type 2 diabetes mellitus with diabetic cataract (CMS/[facility]) (03/10/2020).    Past Surgical History:  She has a past surgical history that includes Other surgical history (03/29/2021); Other surgical history (03/29/2021); Other surgical history (11/29/2022); Other surgical history (05/10/2021); Other surgical history (05/11/2022); Carpal tunnel release (10/13/2014); Other surgical history (10/13/2014); and Incisional breast biopsy (Right).    Social History:  She reports that she has never smoked. She has never been exposed to tobacco smoke. She has never used smokeless tobacco. She reports that she does not currently use alcohol. She reports that she does not use drugs.    Family History:  Family History   Problem Relation Name Age of Onset    Prostate cancer Father      Diabetes type II Father      Breast cancer Sister          2 sisters now with breast cancer both post menopausal.    Diabetes type II Sister      Breast cancer Sister  62    Other (acute myocardial infraction) Brother  59        2021    Other (herion addiction) Daughter      Other (alcohol dependence) Daughter      Drug abuse Daughter      No Known Problems Father's Brother      Other (Large b cell lymphoma of lymph nodes on neck) Maternal Grandfather      Other (diffuse large b cell lymphoma of lymph nodes of neck) Half-Sister         Allergies:  Propoxyphene n-acetaminophen, Adhesive tape-silicones, Cefaclor, and Latex    Outpatient Medications:  Current Outpatient Medications   Medication Instructions    amLODIPine (NORVASC) 10 mg, oral, Daily    aspirin 81 mg EC tablet 1 tablet, oral, Daily    blood sugar diagnostic strip 1 strip, miscellaneous, 3 times daily,  ONE TOUCH ULTRA 2 TEST STRIPS    carvedilol (COREG) 25 mg, oral, 2 times daily with meals    cholecalciferol (VITAMIN D-3) 25 mcg, oral, Daily    cyanocobalamin, vitamin B-12, 1,000 mcg tablet, sublingual sublingual, one tab sublingual daily five days per week, monday thru friday    diabetic supplies, miscellan. misc miscellaneous, Onetouch ultrasoft lancets misc    empagliflozin (Jardiance) 25 mg TAKE 1 TABLET EVERY MORNING    ezetimibe (ZETIA) 10 mg, oral, Daily, DAILY    fluvastatin (LESCOL) 40 mg, oral, Nightly    FreeStyle Cory sensor system (FreeStyle Cory 2 Sensor) kit Use as instructed    hydroCHLOROthiazide (HYDRODIURIL) 25 mg, oral, Daily, DAILY    insulin glargine (LANTUS SOLOSTAR U-100 INSULIN) 20 Units, subcutaneous, Nightly, Take as directed per insulin instructions.    insulin lispro (HumaLOG) 100 unit/mL injection Inject insulin in divided doses at breakfast, lunch, and dinner. Total daily dose 75 units.    lancets (MICROLET LANCET MISC) miscellaneous, Test sugar 6 times a day    loratadine (CLARITIN REDITABS) 10 mg, oral, Daily    MAGNESIUM CITRATE ORAL 1 tablet, oral, Nightly, MAGNESIUM CITRATE 200 MG    meclizine (ANTIVERT) 12.5 mg, oral, Every 24 hours    omega-3/dha/epa/fish oil (OMEGA-3 ORAL) 1 capsule, oral, 2 times daily, 1,000 MG 2 TIMES A DAY    psyllium (Metamucil) 3.4 gram packet oral, Nightly, Dissolve 1 tsp in 8 oz of water and drink daily at bedtime and not near other medication, forconstipation       Last Recorded Vitals:  Vitals:     12/19/23 1545   BP: (!) 185/75   BP Location: Right arm   Patient Position: Sitting   BP Cuff Size: Adult   Pulse: 68   SpO2: 95%   Weight: 73 kg (161 lb)   Height: 1.524 m (5')         Laboratory Studies:  Lab Results   Component Value Date    GLUCOSE 233 (H) 12/16/2023    K 4.3 12/16/2023    CREATININE 1.59 (H) 12/16/2023     Lab Results   Component Value Date    ALT 18 12/16/2023    AST 18 12/16/2023    ALKPHOS 70 12/16/2023    BILITOT 1.2  "12/16/2023     Lab Results   Component Value Date    CHOL 185 12/16/2023    CHOL 150 09/16/2023    CHOL 132 05/19/2023     Lab Results   Component Value Date    HDL 49.3 12/16/2023    HDL 42.5 09/16/2023    HDL 37.9 (A) 05/19/2023     Lab Results   Component Value Date    LDLCALC 102 (H) 12/16/2023     Lab Results   Component Value Date    TRIG 171 (H) 12/16/2023    TRIG 143 09/16/2023    TRIG 184 (H) 05/19/2023     No components found for: \"CHOLHDL\"  No components found for: \"UACR\"  Lab Results   Component Value Date    HGBA1C 6.6 (H) 12/16/2023    HGBA1C 8.5 (A) 09/16/2023    HGBA1C 8.4 (A) 06/07/2023             Cardiology Tests:  ECG:No results found for this or any previous visit from the past 1095 days.    Echo:No results found for this or any previous visit from the past 1095 days.    Cath:No results found for this or any previous visit from the past 1095 days.    Stress Test:  Nuclear Stress Test 06/30/2023    Cardiac Imaging:  CT HEART CALCIUM SCORING WO IV CONTRAST 01/29/2021    Assessment/Plan     Patient-Centered Goals:    Notes:  Decrease lispro to 10 units tid, Increase Ozempic to 0.5 weekly, APC referral, titrate Ozempic.  FUV in 3 month with bloodwork.      Notable Changes:   HbA1c    Referral Orders Placed Today:    Clinical Pharmacist                "

## 2023-12-20 PROCEDURE — RXMED WILLOW AMBULATORY MEDICATION CHARGE

## 2023-12-22 ENCOUNTER — PHARMACY VISIT (OUTPATIENT)
Dept: PHARMACY | Facility: CLINIC | Age: 78
End: 2023-12-22
Payer: COMMERCIAL

## 2023-12-29 ENCOUNTER — TELEMEDICINE (OUTPATIENT)
Dept: PHARMACY | Facility: HOSPITAL | Age: 78
End: 2023-12-29
Payer: MEDICARE

## 2023-12-29 DIAGNOSIS — E11.9 TYPE 2 DIABETES MELLITUS WITH INSULIN THERAPY (MULTI): ICD-10-CM

## 2023-12-29 DIAGNOSIS — Z79.4 TYPE 2 DIABETES MELLITUS WITH INSULIN THERAPY (MULTI): ICD-10-CM

## 2023-12-29 ASSESSMENT — ENCOUNTER SYMPTOMS: DIABETIC ASSOCIATED SYMPTOMS: 0

## 2023-12-29 NOTE — PROGRESS NOTES
Pharmacy [facility]   Follow Up  Patient is sent at the request of Alma Pavon MD for medication management.  My final recommendations will be communicated back to the requesting provider by way of shared medical record.  Subjective   Diabetes  She presents for her initial diabetic visit. She has type 2 diabetes mellitus. Her disease course has been improving. There are no hypoglycemic associated symptoms. There are no diabetic associated symptoms. There are no hypoglycemic complications. Symptoms are stable. Risk factors for coronary artery disease include diabetes mellitus, dyslipidemia, hypertension and obesity. Current diabetic treatments: Ozempic 0.5 mg weekly, Jardiance 25 mg daily, Lantus 40 units daily, Humalog 10 units TID AC. She is compliant with treatment all of the time. Her overall blood glucose range is 130-140 mg/dl. An ACE inhibitor/angiotensin II receptor blocker is not being taken.       Cardiovascular risk factors include advanced age (older than 55 for men, 65 for women), diabetes mellitus, dyslipidemia, hypertension, and obesity (BMI >= 30 kg/m2). The patient is not on an ACE inhibitor or angiotensin II receptor blocker.  The patient has not been previously hospitalized due to diabetic ketoacidosis.     Allergies   Allergen Reactions    Propoxyphene N-Acetaminophen Drowsiness    Adhesive Tape-Silicones Other     Steristrips - takes skin off    Cefaclor Palpitations    Latex Rash     Current monitoring regimen:  Patient is using continuous glucose monitor; FreeStyle Cory 2; not connected to smartphone     Reported SMBG Measurements  PPBG: ranges 120-150 mg/dL  Notes BG has been a little high around the holidays     Any episodes of hypoglycemia? yes - one incident of 59; drinks some juice to bring back up      Adverse Effects:   Patient denies any GI adverse effects (Upset stomach/diarrhea/constipation/nausea/vomiting)  Patient denies any urinary side effects from the Jardiance      Reported Weight:  Baseline: 174 lbs   Current: 161 lbs   Patient reported goal: no set goal per patient     Adherence: no issues reported   Affordability/Accessibility: no issues affording     Objective   Pharmacotherapy:    Lantus solostar 20 units subcutaneous once daily AM -- Patient taking 40 units once daily   Humalog KwikPen 10 units TID AC   Ozempic 0.5 mg inject 0.5 mg under the skin once a week on Wednesdays (First dose 12/20/23)  Jardiance 25 mg once daily     Historical Pharmacotherapy:  Humulin N insulin   Metformin - d/c'ed by Dr. Shelley in 2020    Secondary Prevention  Statin? Yes   ACE-I/ARB? No    Pertinent PMH Review  PMH of Pancreatitis: No  PMH of Retinopathy: No  PMH of Recurrent Urinary Tract Infections: No  PMH of Medullary Thyroid Cancer (MTC): No  PMH of Multiple Endocrine Neoplasia (MEN) Type II: No    Health Maintenance:   Foot Exam: does not follow podiatrist, monitored by PCP  Eye Exam: follows optometrist   Lipid Panel:  mg/dL   Urine Albumin: 13 mg/L  Influenza Vaccine: 10/2023  Pneumonia Vaccine: PCV13 2015, PPSV23 2020    Labs:   Lab Results   Component Value Date    BILITOT 1.2 12/16/2023    CALCIUM 10.0 12/16/2023    CO2 30 12/16/2023    CL 99 12/16/2023    CREATININE 1.59 (H) 12/16/2023    GLUCOSE 233 (H) 12/16/2023    ALKPHOS 70 12/16/2023    K 4.3 12/16/2023    PROT 7.4 12/16/2023     12/16/2023    AST 18 12/16/2023    ALT 18 12/16/2023    BUN 29 (H) 12/16/2023    ANIONGAP 15 12/16/2023    MG 2.67 (H) 12/16/2023    PHOS 3.7 01/03/2023    ALBUMIN 4.5 12/16/2023    GFRF 37 (A) 09/16/2023     Lab Results   Component Value Date    TRIG 171 (H) 12/16/2023    CHOL 185 12/16/2023    LDLCALC 102 (H) 12/16/2023    HDL 49.3 12/16/2023     Lab Results   Component Value Date    HGBA1C 6.6 (H) 12/16/2023    HGBA1C 8.5 (A) 09/16/2023    HGBA1C 8.4 (A) 06/07/2023     The 10-year ASCVD risk score (Prince PLASCENCIA, et al., 2019) is: 69.7%    Values used to calculate the score:      Age:  78 years      Sex: Female      Is Non- : No      Diabetic: Yes      Tobacco smoker: No      Systolic Blood Pressure: 185 mmHg      Is BP treated: Yes      HDL Cholesterol: 49.3 mg/dL      Total Cholesterol: 185 mg/dL    Assessment/Plan   Problem List Items Addressed This Visit          Endocrine/Metabolic    Type 2 diabetes mellitus with insulin therapy (CMS/[facility])     Patients diabetes is well controlled with most recent A1c of 6.6% (goal < 7 %). Glycemic control is estimated to still be at goal. Patient's SMBG are slightly elevated. Patient has been tolerating both Ozempic and Jardiance with no side effects.   Provided Ozempic education including eating smaller meals and limiting greasy or deep fried foods as this increases risk of nausea. Counseled patient that Jardiance puts them at risk for UTI/genital candidiasis; maintaining and practicing proper hygiene will minimize risk of this side effect. Encouraged patient to increase fluid intake as Jardiance may cause dehydration due to potential frequent urination.   Compliance at present is estimated to be excellent.     PLAN  Continue Ozempic 0.5 mg weekly, Jardiance 25 mg daily, Lantus 40 units daily, Humalog 10 units TID AC  Follow up:  1 week  by telephone to titrate up the Ozempic and adjust insulin according to SMBG.    Elma Connor, PharmD    Continue all meds under the continuation of care with the referring provider and clinical pharmacy team.

## 2024-01-05 ENCOUNTER — TELEMEDICINE (OUTPATIENT)
Dept: PHARMACY | Facility: HOSPITAL | Age: 79
End: 2024-01-05
Payer: MEDICARE

## 2024-01-05 DIAGNOSIS — E11.9 TYPE 2 DIABETES MELLITUS WITH INSULIN THERAPY (MULTI): Primary | ICD-10-CM

## 2024-01-05 DIAGNOSIS — Z79.4 TYPE 2 DIABETES MELLITUS WITH INSULIN THERAPY (MULTI): Primary | ICD-10-CM

## 2024-01-05 ASSESSMENT — ENCOUNTER SYMPTOMS: DIABETIC ASSOCIATED SYMPTOMS: 0

## 2024-01-05 NOTE — PROGRESS NOTES
Pharmacy [facility]   Follow Up  Patient is sent at the request of Alma Pavon MD for medication management.  My final recommendations will be communicated back to the requesting provider by way of shared medical record.  Subjective   Diabetes  She presents for her follow-up diabetic visit. She has type 2 diabetes mellitus. Her disease course has been improving. There are no hypoglycemic associated symptoms. There are no diabetic associated symptoms. There are no hypoglycemic complications. Symptoms are stable. Risk factors for coronary artery disease include diabetes mellitus, dyslipidemia, hypertension and obesity. Current diabetic treatments: Ozempic 0.5 mg weekly, Jardiance 25 mg daily, Lantus 40 units daily, Humalog 10 units TID AC. She is compliant with treatment all of the time. Her overall blood glucose range is 130-140 mg/dl. An ACE inhibitor/angiotensin II receptor blocker is not being taken.     Cardiovascular risk factors include advanced age (older than 55 for men, 65 for women), diabetes mellitus, dyslipidemia, hypertension, and obesity (BMI >= 30 kg/m2). The patient is not on an ACE inhibitor or angiotensin II receptor blocker.  The patient has not been previously hospitalized due to diabetic ketoacidosis.     At last visit, no changes were made to patient's diabetic regimen. Patient presents today via telephone to assess glycemic control and adjust insulin as we titrate Ozempic.     Allergies   Allergen Reactions    Propoxyphene N-Acetaminophen Drowsiness    Adhesive Tape-Silicones Other     Steristrips - takes skin off    Cefaclor Palpitations    Latex Rash     Current monitoring regimen:  Patient is using continuous glucose monitor; FreeStyle Cory 2; not connected to smartphone     Reported SMBG Measurements  Usual PPBG: ranges 120-150 mg/dL  Notes BG has been a little high around the holidays   7 days average  6a -12p: 206 mg/dL  12p - 6p: 164 mg/dL  6p -12a: 256 mg/dL    Any episodes  of hypoglycemia? While on the phone patient reports accidentally taking 40 units of Humalog instead of the lantus right after her breakfast dose of humalog 10 units and her sugars dropped to 74 then 58 and drank some orange juice while speaking with me.     Adverse Effects:   Patient denies any GI adverse effects (Upset stomach/diarrhea/constipation/nausea/vomiting)  Patient denies any urinary side effects from the Jardiance     Reported Weight:  Baseline: 174 lbs   Current: 161 lbs   Patient reported goal: no set goal per patient     Adherence: no issues reported   Affordability/Accessibility: no issues affording     Objective   Pharmacotherapy:    Lantus solostar 20 units subcutaneous once daily AM -- Patient taking 40 units once daily   Humalog KwikPen 10 units TID AC   Ozempic 0.5 mg inject 0.5 mg under the skin once a week on Wednesdays (First dose 12/20/23)  Jardiance 25 mg once daily     Historical Pharmacotherapy:  Humulin N insulin   Metformin - d/c'ed by Dr. Shelley in 2020    Secondary Prevention  Statin? Yes   ACE-I/ARB? No    Labs:   Lab Results   Component Value Date    BILITOT 1.2 12/16/2023    CALCIUM 10.0 12/16/2023    CO2 30 12/16/2023    CL 99 12/16/2023    CREATININE 1.59 (H) 12/16/2023    GLUCOSE 233 (H) 12/16/2023    ALKPHOS 70 12/16/2023    K 4.3 12/16/2023    PROT 7.4 12/16/2023     12/16/2023    AST 18 12/16/2023    ALT 18 12/16/2023    BUN 29 (H) 12/16/2023    ANIONGAP 15 12/16/2023    MG 2.67 (H) 12/16/2023    PHOS 3.7 01/03/2023    ALBUMIN 4.5 12/16/2023    GFRF 37 (A) 09/16/2023     Lab Results   Component Value Date    TRIG 171 (H) 12/16/2023    CHOL 185 12/16/2023    LDLCALC 102 (H) 12/16/2023    HDL 49.3 12/16/2023     Lab Results   Component Value Date    HGBA1C 6.6 (H) 12/16/2023    HGBA1C 8.5 (A) 09/16/2023    HGBA1C 8.4 (A) 06/07/2023       Assessment/Plan   Problem List Items Addressed This Visit          Endocrine/Metabolic    Type 2 diabetes mellitus with insulin therapy  (CMS/[facility]) - Primary   Patients diabetes is stable with most recent A1c of 6.6% (goal < 7 %). Glycemic control is estimated to still be somewhat at goal. Patient's SMBG are slightly elevated especially with the holidays which is expected. Patient has been tolerating both Ozempic and Jardiance with no side effects. Patient has accidentally taken 40 units of Humalog instead of the Lantus right after her breakfast dose of humalog (10 units) and her sugars dropped to 74 then 58 while on the phone with me. Patient drank a cup of orange juice to bring sugars back up.     PLAN  Advised patient to keep orange juice/source of carb around her and keep a close eye on BG readings today. Advised to skip dose of Lantus for today and check BG before each meal and if BG between < 80 mg/dL to take 5 units of Humalog and make sure to eat. This will hopefully provide insulin coverage in place of the Lantus for the day. Resume regular dose of Lantus 40 units tomorrow 1/6/24. Patient is very health literate and verbalizes understanding in keeping a close eye on her sugars for today.   Continue Ozempic 0.5 mg weekly, Jardiance 25 mg daily, Lantus 40 units daily, Humalog 10 units TID AC  Follow up:  1 week  by telephone to titrate up the Ozempic and adjust insulins according to SMBG.  Strongly encouraged patient to reach out with questions prior to next appointment. Provided direct line to reach me.       Elma Connor, PharmD    Continue all meds under the continuation of care with the referring provider and clinical pharmacy team.

## 2024-01-09 ENCOUNTER — TELEPHONE (OUTPATIENT)
Dept: CARDIOLOGY | Facility: CLINIC | Age: 79
End: 2024-01-09
Payer: MEDICARE

## 2024-01-09 NOTE — TELEPHONE ENCOUNTER
[facility] follow up - phone call     Pt is doing well. She is exercising 5 days/week. MDI 40 units Lantus in AM, humalog 10 units BF, L and D  She is wearing the poly 2 sensor.  She had a low glucose event last week when she mixed up her lantus and humalog, but other than that she has not been experiencing many lows.     Ozempic .5 mg weekly. Tolerating fine. Less of an appetite.     She has not been on our Monday zoom calls yet because the time is difficult for her, but she will try to join in future.     Pt emailed instructions on how to uplaod her poly reader at home. She is encouraged to reach out if she experiences low blood sugar events so we can reduce her insulin.    Keiko Franklin RD, [id number]

## 2024-01-12 ENCOUNTER — LAB (OUTPATIENT)
Dept: LAB | Facility: LAB | Age: 79
End: 2024-01-12
Payer: MEDICARE

## 2024-01-12 DIAGNOSIS — D47.2 MONOCLONAL GAMMOPATHY: ICD-10-CM

## 2024-01-12 DIAGNOSIS — N25.81 SECONDARY HYPERPARATHYROIDISM OF RENAL ORIGIN (MULTI): ICD-10-CM

## 2024-01-12 DIAGNOSIS — I10 ESSENTIAL (PRIMARY) HYPERTENSION: Primary | ICD-10-CM

## 2024-01-12 DIAGNOSIS — N18.30 CHRONIC KIDNEY DISEASE, STAGE 3 UNSPECIFIED (MULTI): ICD-10-CM

## 2024-01-12 PROCEDURE — 36415 COLL VENOUS BLD VENIPUNCTURE: CPT

## 2024-01-12 PROCEDURE — 83970 ASSAY OF PARATHORMONE: CPT

## 2024-01-12 PROCEDURE — 82570 ASSAY OF URINE CREATININE: CPT

## 2024-01-12 PROCEDURE — 82306 VITAMIN D 25 HYDROXY: CPT

## 2024-01-12 PROCEDURE — 80069 RENAL FUNCTION PANEL: CPT

## 2024-01-12 PROCEDURE — RXMED WILLOW AMBULATORY MEDICATION CHARGE

## 2024-01-12 PROCEDURE — 82043 UR ALBUMIN QUANTITATIVE: CPT

## 2024-01-12 PROCEDURE — 84156 ASSAY OF PROTEIN URINE: CPT

## 2024-01-12 PROCEDURE — 81003 URINALYSIS AUTO W/O SCOPE: CPT

## 2024-01-13 LAB
25(OH)D3 SERPL-MCNC: 45 NG/ML (ref 30–100)
ALBUMIN SERPL BCP-MCNC: 4.8 G/DL (ref 3.4–5)
ANION GAP SERPL CALC-SCNC: 15 MMOL/L (ref 10–20)
APPEARANCE UR: CLEAR
BILIRUB UR STRIP.AUTO-MCNC: NEGATIVE MG/DL
BUN SERPL-MCNC: 27 MG/DL (ref 6–23)
CALCIUM SERPL-MCNC: 10.4 MG/DL (ref 8.6–10.6)
CHLORIDE SERPL-SCNC: 99 MMOL/L (ref 98–107)
CO2 SERPL-SCNC: 31 MMOL/L (ref 21–32)
COLOR UR: YELLOW
CREAT SERPL-MCNC: 1.5 MG/DL (ref 0.5–1.05)
CREAT UR-MCNC: 86.8 MG/DL (ref 20–320)
EGFRCR SERPLBLD CKD-EPI 2021: 36 ML/MIN/1.73M*2
GLUCOSE SERPL-MCNC: 71 MG/DL (ref 74–99)
GLUCOSE UR STRIP.AUTO-MCNC: ABNORMAL MG/DL
HOLD SPECIMEN: NORMAL
KETONES UR STRIP.AUTO-MCNC: NEGATIVE MG/DL
LEUKOCYTE ESTERASE UR QL STRIP.AUTO: NEGATIVE
MICROALBUMIN UR-MCNC: 35.3 MG/L
MICROALBUMIN/CREAT UR: 40.7 UG/MG CREAT
NITRITE UR QL STRIP.AUTO: NEGATIVE
PH UR STRIP.AUTO: 6 [PH]
PHOSPHATE SERPL-MCNC: 3.9 MG/DL (ref 2.5–4.9)
POTASSIUM SERPL-SCNC: 4.1 MMOL/L (ref 3.5–5.3)
PROT UR STRIP.AUTO-MCNC: NEGATIVE MG/DL
PROT UR-ACNC: 17 MG/DL (ref 5–25)
PTH-INTACT SERPL-MCNC: 73.6 PG/ML (ref 18.5–88)
RBC # UR STRIP.AUTO: NEGATIVE /UL
SODIUM SERPL-SCNC: 141 MMOL/L (ref 136–145)
SP GR UR STRIP.AUTO: 1.01
UROBILINOGEN UR STRIP.AUTO-MCNC: <2 MG/DL

## 2024-01-16 ENCOUNTER — PHARMACY VISIT (OUTPATIENT)
Dept: PHARMACY | Facility: CLINIC | Age: 79
End: 2024-01-16
Payer: MEDICARE

## 2024-01-19 ENCOUNTER — TELEMEDICINE (OUTPATIENT)
Dept: PHARMACY | Facility: HOSPITAL | Age: 79
End: 2024-01-19
Payer: MEDICARE

## 2024-01-19 DIAGNOSIS — Z79.4 TYPE 2 DIABETES MELLITUS WITH INSULIN THERAPY (MULTI): Primary | ICD-10-CM

## 2024-01-19 DIAGNOSIS — E11.9 TYPE 2 DIABETES MELLITUS WITH INSULIN THERAPY (MULTI): Primary | ICD-10-CM

## 2024-01-19 ASSESSMENT — ENCOUNTER SYMPTOMS: DIABETIC ASSOCIATED SYMPTOMS: 0

## 2024-01-19 NOTE — PROGRESS NOTES
Pharmacy [facility]   Follow Up  Patient is sent at the request of Alma Pavon MD for medication management.  My final recommendations will be communicated back to the requesting provider by way of shared medical record.  Subjective   Diabetes  She presents for her follow-up diabetic visit. She has type 2 diabetes mellitus. Her disease course has been improving. There are no hypoglycemic associated symptoms. There are no diabetic associated symptoms. There are no hypoglycemic complications. Symptoms are stable. Risk factors for coronary artery disease include diabetes mellitus, dyslipidemia, hypertension and obesity. Current diabetic treatments: Ozempic 0.5 mg weekly, Jardiance 25 mg daily, Lantus 40 units daily, Humalog 10 units TID AC. She is compliant with treatment all of the time. Her overall blood glucose range is 140-180 mg/dl. An ACE inhibitor/angiotensin II receptor blocker is not being taken.     Cardiovascular risk factors include advanced age (older than 55 for men, 65 for women), diabetes mellitus, dyslipidemia, hypertension, and obesity (BMI >= 30 kg/m2). The patient is not on an ACE inhibitor or angiotensin II receptor blocker.  The patient has not been previously hospitalized due to diabetic ketoacidosis.     At last visit, no changes were made to patient's diabetic regimen. Patient presents today via telephone to assess glycemic control and maybe adjust insulin and/or Ozempic.     Allergies   Allergen Reactions    Propoxyphene N-Acetaminophen Drowsiness    Adhesive Tape-Silicones Other     Steristrips - takes skin off    Cefaclor Palpitations    Latex Rash     Current monitoring regimen:  Patient is using continuous glucose monitor; FreeStyle Cory 2; not connected to smartphone     Reported SMBG Measurements  This morning:  mg/dL; PPBG 125 mg/dL    Current 7 day average: 176 mg/dL  Time Range BG (mg/dL)   6a -12p 177   12p - 6p 166   6p -12a 190     Current 14 day average: 187  mg/dL  Time Range BG (mg/dL)   6a -12p 191   12p - 6p 171   6p -12a 205     7-day AVG B mg/dL   7-day   Very High (>250) 0%   High (181-249) 42%   Target Range () 58%   Low (54-69) 0%   Very Low (<54) 0%     Previous 7 day average: 208 mg/dL  Time Range BG (mg/dL)   6a -12p 206   12p - 6p 164   6p -12a 256     Any episodes of hypoglycemia? No    Adverse Effects:   Patient denies any GI adverse effects (Upset stomach/diarrhea/constipation/nausea/vomiting)  Patient denies any urinary side effects from the Jardiance     Reported Weight:  Baseline: 174 lbs   Current: 157 lbs   Patient reported goal: no set goal per patient     Adherence: no issues reported   Affordability/Accessibility: no issues affording     Objective   Pharmacotherapy:    Lantus solostar 20 units subcutaneous once daily AM -- Patient taking 40 units once daily   Humalog KwikPen 10 units TID AC   Ozempic 0.5 mg inject 0.5 mg under the skin once a week on  (First dose 23)  Jardiance 25 mg once daily     Historical Pharmacotherapy:  Humulin N insulin   Metformin - d/c'ed by Dr. Shelley in     Secondary Prevention  Statin? Yes   ACE-I/ARB? No    Labs:   Lab Results   Component Value Date    BILITOT 1.2 2023    CALCIUM 10.4 2024    CO2 31 2024    CL 99 2024    CREATININE 1.50 (H) 2024    GLUCOSE 71 (L) 2024    ALKPHOS 70 2023    K 4.1 2024    PROT 7.4 2023     2024    AST 18 2023    ALT 18 2023    BUN 27 (H) 2024    ANIONGAP 15 2024    MG 2.67 (H) 2023    PHOS 3.9 2024    ALBUMIN 4.8 2024    GFRF 37 (A) 2023     Lab Results   Component Value Date    TRIG 171 (H) 2023    CHOL 185 2023    LDLCALC 102 (H) 2023    HDL 49.3 2023     Lab Results   Component Value Date    HGBA1C 6.6 (H) 2023    HGBA1C 8.5 (A) 2023    HGBA1C 8.4 (A) 2023       Assessment/Plan   Problem List Items  Addressed This Visit          Endocrine/Metabolic    Type 2 diabetes mellitus with insulin therapy (CMS/[facility]) - Primary   Patients diabetes is stable with most recent A1c of 6.6% (goal < 7 %). Glycemic control is estimated to be slightly above goal. Patient has been tolerating both Ozempic and Jardiance with no side effects ad reports losing ~5 lbs of weight since we last spoke. Patient has not experienced any hypoglycemic episodes except 2 instances when she accidentally took 40 units of Humalog along with 10 units of Humalog the last time we talked in which she managed with orange juice and kept close eye on sugars.     Patient has taken 4 doses of Ozempic 0.5 mg so far, discussed with patient the benefits of titrating up the Ozempic to the next dose including lowering her BG and experiencing more weight loss. Patient would like to take it slow and titrate up at follow up.     Patient reports her blood pressures have been well under control.     PLAN  Continue Ozempic 0.5 mg weekly, Jardiance 25 mg daily, Lantus 40 units daily, Humalog 10 units TID AC  Follow up:  2 weeks  by telephone to titrate up the Ozempic and adjust insulins according to SMBG.  Strongly encouraged patient to reach out with questions prior to next appointment. Provided direct line to reach me.     Elma Connor, PharmD    Continue all meds under the continuation of care with the referring provider and clinical pharmacy team.

## 2024-02-02 ENCOUNTER — TELEMEDICINE (OUTPATIENT)
Dept: PHARMACY | Facility: HOSPITAL | Age: 79
End: 2024-02-02
Payer: MEDICARE

## 2024-02-02 DIAGNOSIS — E11.9 TYPE 2 DIABETES MELLITUS WITH INSULIN THERAPY (MULTI): Primary | ICD-10-CM

## 2024-02-02 DIAGNOSIS — Z79.4 TYPE 2 DIABETES MELLITUS WITH INSULIN THERAPY (MULTI): Primary | ICD-10-CM

## 2024-02-02 ASSESSMENT — ENCOUNTER SYMPTOMS: DIABETIC ASSOCIATED SYMPTOMS: 0

## 2024-02-02 NOTE — PROGRESS NOTES
Pharmacy [facility]   Follow Up  Patient is sent at the request of Alma Pavon MD for medication management.  My final recommendations will be communicated back to the requesting provider by way of shared medical record.  Subjective   Diabetes  She presents for her follow-up diabetic visit. She has type 2 diabetes mellitus. Her disease course has been improving. There are no hypoglycemic associated symptoms. There are no diabetic associated symptoms. There are no hypoglycemic complications. Symptoms are stable. Risk factors for coronary artery disease include diabetes mellitus, dyslipidemia, hypertension and obesity. Current diabetic treatments: Ozempic 0.5 mg weekly, Jardiance 25 mg daily, Lantus 40 units daily, Humalog 10 units TID AC. She is compliant with treatment all of the time. Her overall blood glucose range is 140-180 mg/dl. An ACE inhibitor/angiotensin II receptor blocker is not being taken. Eye exam is current (Patient just saw her optometrist for her reitonpathy and she notes they are pleased with her progress.).     Cardiovascular risk factors include advanced age (older than 55 for men, 65 for women), diabetes mellitus, dyslipidemia, hypertension, and obesity (BMI >= 30 kg/m2). The patient is not on an ACE inhibitor or angiotensin II receptor blocker.  The patient has not been previously hospitalized due to diabetic ketoacidosis.     At last visit, no changes were made to patient's diabetic regimen. Patient presents today via telephone to assess glycemic control and maybe adjust insulin and/or Ozempic.     Pharmacotherapy   Lantus solostar 20 units subcutaneous once daily AM -- Patient taking 40 units once daily   Humalog KwikPen 10 units TID AC   Ozempic 0.5 mg inject 0.5 mg under the skin once a week on Wednesdays (First dose 12/20/23)  Jardiance 25 mg once daily    Previous Pharmacotherapy    Humulin N insulin   Metformin - d/c'ed by Dr. Shelley in 2020     Allergies   Allergen Reactions     Propoxyphene N-Acetaminophen Drowsiness    Adhesive Tape-Silicones Other     Steristrips - takes skin off    Cefaclor Palpitations    Latex Rash     Current monitoring regimen:  Patient is using continuous glucose monitor; FreeStyle Cory 2; not connected to smartphone     Reported SMBG Measurements  This morning:  mg/dL; PPB - and just finished exercising  7 day average: 203 mg/dL    Current 14 day average: 210 mg/dL  Time Range BG (mg/dL)   6a -12p 229   12p - 6p 208   6p -12a 206     Previous BG Measurements  24 -- 14 day average: 187 mg/dL  Time Range BG (mg/dL)   6a -12p 191   12p - 6p 171   6p -12a 205   TIR: 58%; High (181-249): 42%    24 -- 7 day average: 208 mg/dL  Time Range BG (mg/dL)   6a -12p 206   12p - 6p 164   6p -12a 256     Any episodes of hypoglycemia? No    Adverse Effects:   Patient denies any GI adverse effects (Upset stomach/diarrhea/constipation/nausea/vomiting)  Patient denies any urinary side effects from the Jardiance     Reported Weight:  Baseline: 174 lbs   Current: 157 lbs   Patient reported goal: no set goal per patient     Adherence: no issues reported   Affordability/Accessibility: Ozempic costs $300/month for patient; needs to meet a deductible.    Risk Reducing Meds  On ACEi/ARB: No   On Statin: Yes   On SGLT2i: Yes   On GLP1-RA: Yes     Objective   Labs:   Lab Results   Component Value Date    BILITOT 1.2 2023    CALCIUM 10.4 2024    CO2 31 2024    CL 99 2024    CREATININE 1.50 (H) 2024    GLUCOSE 71 (L) 2024    ALKPHOS 70 2023    K 4.1 2024    PROT 7.4 2023     2024    AST 18 2023    ALT 18 2023    BUN 27 (H) 2024    ANIONGAP 15 2024    MG 2.67 (H) 2023    PHOS 3.9 2024    ALBUMIN 4.8 2024    GFRF 37 (A) 2023     Lab Results   Component Value Date    TRIG 171 (H) 2023    CHOL 185 2023    LDLCALC 102 (H) 2023    HDL 49.3  12/16/2023     Lab Results   Component Value Date    HGBA1C 6.6 (H) 12/16/2023    HGBA1C 8.5 (A) 09/16/2023    HGBA1C 8.4 (A) 06/07/2023       Assessment/Plan   Problem List Items Addressed This Visit          Endocrine/Metabolic    Type 2 diabetes mellitus with insulin therapy (CMS/[facility]) - Primary   Patients diabetes is stable with most recent A1c of 6.6% (goal < 7 %). Glycemic control is estimated to be slightly above goal. Patient has been tolerating both Ozempic and Jardiance with no side effects ad reports losing ~5 lbs of weight since we last spoke. Patient has not experienced any hypoglycemic episodes except when she accidentally took 40 units of Humalog along with 10 units of Humalog the last time we talked in which she managed with orange juice and kept close eye on sugars.     Patient saw her opthalmology yesterday and notes provider is pleased with her retinopathy progression.    Patient has taken 6 doses of Ozempic 0.5 mg so far, discussed with patient the benefits of titrating up the Ozempic to the next dose including lowering her BG further and experiencing more weight loss. Patient just picked up a new fill for the 0.5 mg dose and notes she pays $300/month for it so she would like to hold off on titrating.     PLAN  Continue Ozempic 0.5 mg weekly, Jardiance 25 mg daily, Lantus 40 units daily,   Increase Humalog 10 units TID AC to 13 units with meals  Follow up:  3 weeks  by telephone to titrate up the Ozempic and adjust insulins according to SMBG.  Strongly encouraged patient to reach out with questions prior to next appointment.     Elma Connor, PharmD    Continue all meds under the continuation of care with the referring provider and clinical pharmacy team.

## 2024-02-19 ENCOUNTER — OFFICE VISIT (OUTPATIENT)
Dept: PRIMARY CARE | Facility: CLINIC | Age: 79
End: 2024-02-19
Payer: MEDICARE

## 2024-02-19 VITALS
BODY MASS INDEX: 31.04 KG/M2 | RESPIRATION RATE: 18 BRPM | HEIGHT: 59 IN | HEART RATE: 72 BPM | DIASTOLIC BLOOD PRESSURE: 78 MMHG | SYSTOLIC BLOOD PRESSURE: 156 MMHG | WEIGHT: 154 LBS

## 2024-02-19 DIAGNOSIS — E11.9 TYPE 2 DIABETES MELLITUS WITH INSULIN THERAPY (MULTI): ICD-10-CM

## 2024-02-19 DIAGNOSIS — Z63.4 GRIEF AT LOSS OF CHILD: Primary | ICD-10-CM

## 2024-02-19 DIAGNOSIS — Z79.4 TYPE 2 DIABETES MELLITUS WITH INSULIN THERAPY (MULTI): ICD-10-CM

## 2024-02-19 DIAGNOSIS — F43.21 GRIEF AT LOSS OF CHILD: Primary | ICD-10-CM

## 2024-02-19 DIAGNOSIS — N18.32 STAGE 3B CHRONIC KIDNEY DISEASE (MULTI): ICD-10-CM

## 2024-02-19 PROCEDURE — 1159F MED LIST DOCD IN RCRD: CPT | Performed by: INTERNAL MEDICINE

## 2024-02-19 PROCEDURE — 3078F DIAST BP <80 MM HG: CPT | Performed by: INTERNAL MEDICINE

## 2024-02-19 PROCEDURE — 1126F AMNT PAIN NOTED NONE PRSNT: CPT | Performed by: INTERNAL MEDICINE

## 2024-02-19 PROCEDURE — 1036F TOBACCO NON-USER: CPT | Performed by: INTERNAL MEDICINE

## 2024-02-19 PROCEDURE — 99214 OFFICE O/P EST MOD 30 MIN: CPT | Performed by: INTERNAL MEDICINE

## 2024-02-19 PROCEDURE — 3077F SYST BP >= 140 MM HG: CPT | Performed by: INTERNAL MEDICINE

## 2024-02-19 SDOH — SOCIAL STABILITY - SOCIAL INSECURITY: DISSAPEARANCE AND DEATH OF FAMILY MEMBER: Z63.4

## 2024-02-19 ASSESSMENT — PAIN SCALES - GENERAL: PAINLEVEL: 0-NO PAIN

## 2024-02-19 NOTE — PATIENT INSTRUCTIONS
It is ok to advance on the dose of ozempic at the appropriate time, which sounds like it will be next week. Or the week after.    Watch sugars very carefully when you do this, and adjust down insulin/contact pharm D or myself if running lower.    Keep March 25 appt here, call if problems.
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
## 2024-02-23 ENCOUNTER — TELEMEDICINE (OUTPATIENT)
Dept: PHARMACY | Facility: HOSPITAL | Age: 79
End: 2024-02-23
Payer: MEDICARE

## 2024-02-23 DIAGNOSIS — E11.9 TYPE 2 DIABETES MELLITUS WITH INSULIN THERAPY (MULTI): Primary | ICD-10-CM

## 2024-02-23 DIAGNOSIS — Z79.4 TYPE 2 DIABETES MELLITUS WITH INSULIN THERAPY (MULTI): Primary | ICD-10-CM

## 2024-02-23 PROCEDURE — RXMED WILLOW AMBULATORY MEDICATION CHARGE

## 2024-02-23 ASSESSMENT — ENCOUNTER SYMPTOMS: DIABETIC ASSOCIATED SYMPTOMS: 0

## 2024-02-23 NOTE — PROGRESS NOTES
Pharmacy [facility]   Follow Up  Patient is sent at the request of Alma Pavon MD for medication management.  My final recommendations will be communicated back to the requesting provider by way of shared medical record.  Subjective   Diabetes  She presents for her follow-up diabetic visit. She has type 2 diabetes mellitus. Her disease course has been improving. There are no hypoglycemic associated symptoms. There are no diabetic associated symptoms. There are no hypoglycemic complications. Symptoms are stable. Risk factors for coronary artery disease include diabetes mellitus, dyslipidemia, hypertension and obesity. She is compliant with treatment all of the time. Her overall blood glucose range is 140-180 mg/dl. An ACE inhibitor/angiotensin II receptor blocker is not being taken. Eye exam is current (Patient just saw her optometrist for her reitonpathy and she notes they are pleased with her progress.).     Cardiovascular risk factors include advanced age (older than 55 for men, 65 for women), diabetes mellitus, dyslipidemia, hypertension, and obesity (BMI >= 30 kg/m2). The patient is not on an ACE inhibitor or angiotensin II receptor blocker.  The patient has not been previously hospitalized due to diabetic ketoacidosis.     At last visit, we titrated up her meal time insulin from 10 units to 13 units. Patient presents today via telephone to assess glycemic control and maybe adjust insulin and/or Ozempic. Of note, patient's daughter has recently passed away.     Pharmacotherapy   Lantus solostar 40 units subcutaneous once daily AM  Humalog KwikPen 13 units TID AC -- Patient has self adjusted back down to 10 units d/t having a lower appetite since her daughter's passing.   Ozempic 0.5 mg inject 0.5 mg under the skin once a week on Wednesdays (First dose 12/20/23)  Jardiance 25 mg once daily    Previous Pharmacotherapy    Humulin N insulin   Metformin - d/c'ed by Dr. Shelley in 2020     Current monitoring  regimen:  Patient is using continuous glucose monitor; FreeStyle Cory 2; not connected to smartphone     Reported SMBG Measurements    14 day average: 174 mg/dL  Time Range BG (mg/dL)   6a -12p 169   12p - 6p 159   6p -12a 198   TIR: 52%  High (181-249): 48%  Low (54-69): 0%    Previous BG Measurements  2/2/24 -- 14 day average: 210 mg/dL  Time Range BG (mg/dL)   6a -12p 229   12p - 6p 208   6p -12a 206       1/19/24 -- 14 day average: 187 mg/dL  Time Range BG (mg/dL)   6a -12p 191   12p - 6p 171   6p -12a 205   TIR: 58%; High (181-249): 42%      1/5/24 -- 7 day average: 208 mg/dL  Time Range BG (mg/dL)   6a -12p 206   12p - 6p 164   6p -12a 256     Any episodes of hypoglycemia? Patient notes that since the passing of her daughter, she noticed a few low blood sugar readings 2 hours after breakfast.     Adverse Effects:   Patient denies any GI adverse effects (Upset stomach/diarrhea/constipation/nausea/vomiting)  Patient denies any urinary side effects from the Jardiance     Reported Weight:  Baseline: 174 lbs   Current: 157 lbs   Patient reported goal: no set goal per patient     Adherence: no issues reported   Affordability/Accessibility: Patient has met her deductible so now her Ozempic is $30/month.    Risk Reducing Meds  On ACEi/ARB: No   On Statin: Yes   On SGLT2i: Yes   On GLP1-RA: Yes     Objective   Labs:   Lab Results   Component Value Date    BILITOT 1.2 12/16/2023    CALCIUM 10.4 01/12/2024    CO2 31 01/12/2024    CL 99 01/12/2024    CREATININE 1.50 (H) 01/12/2024    GLUCOSE 71 (L) 01/12/2024    ALKPHOS 70 12/16/2023    K 4.1 01/12/2024    PROT 7.4 12/16/2023     01/12/2024    AST 18 12/16/2023    ALT 18 12/16/2023    BUN 27 (H) 01/12/2024    ANIONGAP 15 01/12/2024    MG 2.67 (H) 12/16/2023    PHOS 3.9 01/12/2024    ALBUMIN 4.8 01/12/2024    GFRF 37 (A) 09/16/2023     Lab Results   Component Value Date    TRIG 171 (H) 12/16/2023    CHOL 185 12/16/2023    LDLCALC 102 (H) 12/16/2023    HDL 49.3  12/16/2023     Lab Results   Component Value Date    HGBA1C 6.6 (H) 12/16/2023    HGBA1C 8.5 (A) 09/16/2023    HGBA1C 8.4 (A) 06/07/2023       Assessment/Plan   Problem List Items Addressed This Visit          Endocrine/Metabolic    Type 2 diabetes mellitus with insulin therapy (CMS/HCC) - Primary    Relevant Medications    semaglutide (OZEMPIC) 1 mg/dose (4 mg/3 mL) pen injector   Patients diabetes is controlled with most recent A1c of 6.6% (goal < 7 %). Glycemic control is estimated to be slightly above goal. Patient has been tolerating both Ozempic and Jardiance with no side effects. Patient has not been experiencing many low blood sugar readings.     Since the passing of patient's daughter, patient has not been eating as much so she went back down to Humalog 10 units with meals instead of 13 units. Patient understands she should not be fasting. I believe increasing the Ozempic and lowering the Lantus by ~10% will be beneficial and prevent patient's sugars from dropping too low as Ozempic provides glucose dependent insulin secretion.     PLAN  Continue Jardiance 25 mg daily, Humalog 10 units with meals   Decrease Lantus 40 units to 35 units once daily in the morning   Increase Ozempic from 0.5 mg to 1 mg once weekly   Follow up:  3 weeks  by telephone   Strongly encouraged patient to reach out with questions or frequently low BG's prior to next appointment.     Elma Connor, PharmD    Continue all meds under the continuation of care with the referring provider and clinical pharmacy team.

## 2024-02-24 ENCOUNTER — PHARMACY VISIT (OUTPATIENT)
Dept: PHARMACY | Facility: CLINIC | Age: 79
End: 2024-02-24
Payer: MEDICARE

## 2024-03-04 ENCOUNTER — OFFICE VISIT (OUTPATIENT)
Dept: CARDIOLOGY | Facility: CLINIC | Age: 79
End: 2024-03-04
Payer: MEDICARE

## 2024-03-04 VITALS
OXYGEN SATURATION: 96 % | WEIGHT: 150 LBS | BODY MASS INDEX: 30.24 KG/M2 | SYSTOLIC BLOOD PRESSURE: 138 MMHG | RESPIRATION RATE: 16 BRPM | HEART RATE: 76 BPM | TEMPERATURE: 97.7 F | DIASTOLIC BLOOD PRESSURE: 76 MMHG | HEIGHT: 59 IN

## 2024-03-04 DIAGNOSIS — Z79.4 TYPE 2 DIABETES MELLITUS WITH INSULIN THERAPY (MULTI): ICD-10-CM

## 2024-03-04 DIAGNOSIS — E11.9 TYPE 2 DIABETES MELLITUS WITH INSULIN THERAPY (MULTI): ICD-10-CM

## 2024-03-04 DIAGNOSIS — Z98.890 HISTORY OF STENT INSERTION OF RENAL ARTERY: ICD-10-CM

## 2024-03-04 DIAGNOSIS — E78.49 OTHER HYPERLIPIDEMIA: Primary | ICD-10-CM

## 2024-03-04 DIAGNOSIS — I10 PRIMARY HYPERTENSION: ICD-10-CM

## 2024-03-04 PROCEDURE — 1126F AMNT PAIN NOTED NONE PRSNT: CPT | Performed by: INTERNAL MEDICINE

## 2024-03-04 PROCEDURE — 1159F MED LIST DOCD IN RCRD: CPT | Performed by: INTERNAL MEDICINE

## 2024-03-04 PROCEDURE — 93005 ELECTROCARDIOGRAM TRACING: CPT | Performed by: INTERNAL MEDICINE

## 2024-03-04 PROCEDURE — 99214 OFFICE O/P EST MOD 30 MIN: CPT | Performed by: INTERNAL MEDICINE

## 2024-03-04 PROCEDURE — 3078F DIAST BP <80 MM HG: CPT | Performed by: INTERNAL MEDICINE

## 2024-03-04 PROCEDURE — 1036F TOBACCO NON-USER: CPT | Performed by: INTERNAL MEDICINE

## 2024-03-04 PROCEDURE — 93010 ELECTROCARDIOGRAM REPORT: CPT | Performed by: INTERNAL MEDICINE

## 2024-03-04 PROCEDURE — 3075F SYST BP GE 130 - 139MM HG: CPT | Performed by: INTERNAL MEDICINE

## 2024-03-04 PROCEDURE — 1160F RVW MEDS BY RX/DR IN RCRD: CPT | Performed by: INTERNAL MEDICINE

## 2024-03-04 RX ORDER — INSULIN GLARGINE 100 [IU]/ML
35 INJECTION, SOLUTION SUBCUTANEOUS EVERY MORNING
Qty: 8 EACH | Refills: 11 | OUTPATIENT
Start: 2024-03-04

## 2024-03-04 RX ORDER — INSULIN GLARGINE 100 [IU]/ML
35 INJECTION, SOLUTION SUBCUTANEOUS EVERY MORNING
Qty: 31.5 ML | Refills: 3 | Status: SHIPPED | OUTPATIENT
Start: 2024-03-04 | End: 2024-03-05 | Stop reason: SDUPTHER

## 2024-03-04 RX ORDER — HYDROCHLOROTHIAZIDE 25 MG/1
12.5 TABLET ORAL DAILY
Qty: 90 TABLET | Refills: 3 | Status: SHIPPED | OUTPATIENT
Start: 2024-03-04 | End: 2025-03-04

## 2024-03-04 NOTE — PROGRESS NOTES
CHIEF COMPLAINT: [facility] follow-up visit    HISTORY OF PRESENT ILLNESS:    PCP: Dr. Jennifer Kenney  Cardiologists: Dr. Ahsna Cook and Dr. Nj Bennett     Ms. Varela is a 79 yo F with hx as listed below who returns to [facility] for follow-up visit; last seen by me in 12/2023. Unfortunately, her daughter passed away from addiction recently and she is dealing with that grief. Denies CP, SOB, dizziness, LE edema, or palpitations. No lows at swimming. Former teacher at [employer]. No issues with ozempic injections or side effects from jardiance. Has lost 11 lbs in the interim. Through Pharmacy, ozempic uptitrated to 1 mg weekly and lantus decreased to 35 units daily. Has done one injection thus far. CGM renewed. Has some lightheadedness with standing and home BP some in the 90-100s systolic.     PAST MEDICAL/SURGICAL HISTORY:  -CKD  -DM with retinopathy  -L renal artery stent for renal artery stenosis (2022): 60% stenosis on L followed by Dr. Bennett with surveillance US  -HTN  -DLD  -vertigo  -ASD vs PFO? (heart cath age 14)  -cataract surgery  -MGUS  -osteopenia  -carotid plaque without stenosis  -carpal tunnel surgery     PRIOR CARDIAC TESTING:  -Lexiscan (2023): no ischemia, EF 80%  -Lexiscan (2021): no ischemia  -CAC (2021): 797  -TTE (2015): EF 60-65%, impaired relaxation, aortic sclerosis, mild TR  -Treadmill stress ECG (2015): negative  -TTE (2012): EF 70%, impaired relaxation, mid-cavity obstruction 43mmHg with valsalva    Allergies   Allergen Reactions    Propoxyphene N-Acetaminophen Drowsiness    Adhesive Tape-Silicones Other     Steristrips - takes skin off    Cefaclor Palpitations    Latex Rash     Current Outpatient Medications:     amLODIPine (Norvasc) 10 mg tablet, Take 1 tablet (10 mg) by mouth once daily., Disp: 90 tablet, Rfl: 3    aspirin 81 mg EC tablet, Take 1 tablet (81 mg) by mouth once daily., Disp: , Rfl:     blood sugar diagnostic strip, 1 strip 3 times a day. ONE TOUCH ULTRA 2 TEST STRIPS, Disp: 270  strip, Rfl: 3    carvedilol (Coreg) 25 mg tablet, Take 1 tablet (25 mg) by mouth 2 times a day with meals., Disp: 180 tablet, Rfl: 1    cholecalciferol (Vitamin D-3) 25 MCG (1000 UT) tablet, Take 1 tablet (25 mcg) by mouth once daily., Disp: , Rfl:     cyanocobalamin, vitamin B-12, 1,000 mcg tablet, sublingual, Place under the tongue. one tab sublingual daily five days per week, monday thru friday, Disp: , Rfl:     diabetic supplies, miscellan. misc, Onetouch ultrasoft lancets misc, Disp: , Rfl:     empagliflozin (Jardiance) 25 mg, TAKE 1 TABLET BY MOUTH EVERY MORNING, Disp: 30 tablet, Rfl: 11    ezetimibe (Zetia) 10 mg tablet, Take 1 tablet (10 mg) by mouth once daily. DAILY, Disp: 90 tablet, Rfl: 3    fluvastatin (Lescol) 40 mg capsule, Take 1 capsule (40 mg) by mouth once daily at bedtime., Disp: 90 capsule, Rfl: 1    FreeStyle Cory sensor system (FreeStyle Cory 2 Sensor) kit, Use as instructed, Disp: 1 each, Rfl: 0    hydroCHLOROthiazide (HYDRODiuril) 25 mg tablet, Take 1 tablet (25 mg) by mouth once daily. DAILY, Disp: 90 tablet, Rfl: 3    insulin glargine (Lantus Solostar U-100 Insulin) 100 unit/mL (3 mL) pen, Inject 20 Units under the skin once daily at bedtime. Take as directed per insulin instructions. (Patient taking differently: Inject 40 Units under the skin once daily in the morning. Take as directed per insulin instructions.), Disp: 30 mL, Rfl: 1    insulin lispro (HumaLOG U-100 Insulin) 100 unit/mL injection, Inject 0.1 mL (10 Units) under the skin 3 times a day with meals. Take as directed per insulin instructions., Disp: 10 mL, Rfl: 9    lancets (MICROLET LANCET MISC), Test sugar 6 times a day, Disp: , Rfl:     loratadine (Claritin Reditabs) 10 mg disintegrating tablet, Take 1 tablet (10 mg) by mouth once daily., Disp: , Rfl:     MAGNESIUM CITRATE ORAL, Take 1 tablet by mouth at bedtime. MAGNESIUM CITRATE 200 MG, Disp: , Rfl:     meclizine (Antivert) 12.5 mg tablet, Take 1 tablet (12.5 mg) by  "mouth once every 24 hours., Disp: , Rfl:     omega-3/dha/epa/fish oil (OMEGA-3 ORAL), Take 1 capsule by mouth in the morning and at bedtime. 1,000 MG 2 TIMES A DAY, Disp: , Rfl:     psyllium (Metamucil) 3.4 gram packet, Take by mouth at bedtime. Dissolve 1 tsp in 8 oz of water and drink daily at bedtime and not near other medication, forconstipation, Disp: , Rfl:     semaglutide (OZEMPIC) 1 mg/dose (4 mg/3 mL) pen injector, Inject 1 mg under the skin 1 (one) time per week., Disp: 3 mL, Rfl: 3    /76 (BP Location: Right arm, Patient Position: Sitting, BP Cuff Size: Adult)   Pulse 76   Temp 36.5 °C (97.7 °F) (Temporal)   Resp 16   Ht 1.499 m (4' 11\")   Wt 68 kg (150 lb)   SpO2 96%   BMI 30.30 kg/m²     PHYSICAL EXAM:  GENERAL: NAD  HEENT: no JVD  CV: RRR without m/r/g  PULM: CTAB  EXT: non-edematous bilateral lower extremities     LABS  WBC (x10*3/uL)   Date Value   12/04/2023 6.7     Hemoglobin (g/dL)   Date Value   12/04/2023 16.2 (H)     Platelets (x10*3/uL)   Date Value   12/04/2023 208     Sodium (mmol/L)   Date Value   01/12/2024 141     Potassium (mmol/L)   Date Value   01/12/2024 4.1     Chloride (mmol/L)   Date Value   01/12/2024 99     Bicarbonate (mmol/L)   Date Value   01/12/2024 31     Urea Nitrogen (mg/dL)   Date Value   01/12/2024 27 (H)     Creatinine (mg/dL)   Date Value   01/12/2024 1.50 (H)     Calcium (mg/dL)   Date Value   01/12/2024 10.4     Total Protein (g/dL)   Date Value   12/16/2023 7.4     Bilirubin, Total (mg/dL)   Date Value   12/16/2023 1.2     Alkaline Phosphatase (U/L)   Date Value   12/16/2023 70     ALT (U/L)   Date Value   12/16/2023 18     AST (U/L)   Date Value   12/16/2023 18     Glucose (mg/dL)   Date Value   01/12/2024 71 (L)     Cholesterol (mg/dL)   Date Value   12/16/2023 185   09/16/2023 150   05/19/2023 132   04/27/2022 129     LDL Calculated (mg/dL)   Date Value   12/16/2023 102 (H)     HDL-Cholesterol (mg/dL)   Date Value   12/16/2023 49.3     HDL (mg/dL) "   Date Value   09/16/2023 42.5   05/19/2023 37.9 (A)   04/27/2022 41.8     Triglycerides (mg/dL)   Date Value   12/16/2023 171 (H)   09/16/2023 143   05/19/2023 184 (H)   04/27/2022 160 (H)     POC HEMOGLOBIN A1c (%)   Date Value   06/07/2023 8.4 (A)   03/07/2023 8.2 (A)     Hemoglobin A1C (%)   Date Value   12/16/2023 6.6 (H)   09/16/2023 8.5 (A)   11/15/2021 7.4 (A)     Lab Results   Component Value Date    LDLF 79 09/16/2023     ASSESSMENT/PLAN: 77 yo F with hx of DM, CKD, HTN, DLD, and L renal artery stent for renal artery stenosis (2022) with 60% stenosis on L followed by Dr. Bennett with surveillance US here for [facility] follow-up. Great improvement in blood sugar control! HgbA1c 6.6%, , , Cr 1.5, LFTs fine. Continue uptitrated ozempic 1 mg weekly and continue humalog 10 units TID AC and lantus 35 units nightly. Continue jardiance 25 mg daily. Goal in the future as we uptitrate ozempic is to get off SSI and need even less basal insulin while achieving weight loss. Has CGM. BP running on lower side with some LH so decrease hydrochlorothiazide from 25 to 12.5 mg daily and continue amlodipine 10 mg daily and coreg 25 mg BID. Noted not on ACE-I. LDL previously in the 70s and now up to 102 on ezetimibe 10 mg daily, omega 3, and fluvasatin 40 mg daily. Will continue to watch as not at goal for secondary prevention. If LDL remains elevated in the future, consider switching statin or [id number]. On ASA 81 mg daily given prior renal stent. Follows with Endovascular regarding elevated velocities in L renal artery with plan for repeat US in 6 months. RTC 3 months with labs as per PCP      Alma Pavon MD

## 2024-03-05 DIAGNOSIS — E11.9 TYPE 2 DIABETES MELLITUS WITH INSULIN THERAPY (MULTI): ICD-10-CM

## 2024-03-05 DIAGNOSIS — Z79.4 TYPE 2 DIABETES MELLITUS WITH INSULIN THERAPY (MULTI): ICD-10-CM

## 2024-03-05 RX ORDER — INSULIN GLARGINE 100 [IU]/ML
35 INJECTION, SOLUTION SUBCUTANEOUS EVERY MORNING
Qty: 31.5 ML | Refills: 3 | Status: SHIPPED | OUTPATIENT
Start: 2024-03-05 | End: 2025-03-05

## 2024-03-06 LAB
ATRIAL RATE: 79 BPM
P AXIS: -12 DEGREES
P OFFSET: 189 MS
P ONSET: 136 MS
PR INTERVAL: 160 MS
Q ONSET: 216 MS
QRS COUNT: 13 BEATS
QRS DURATION: 80 MS
QT INTERVAL: 370 MS
QTC CALCULATION(BAZETT): 424 MS
QTC FREDERICIA: 405 MS
R AXIS: -10 DEGREES
T AXIS: 66 DEGREES
T OFFSET: 401 MS
VENTRICULAR RATE: 79 BPM

## 2024-03-15 ENCOUNTER — TELEMEDICINE (OUTPATIENT)
Dept: PHARMACY | Facility: HOSPITAL | Age: 79
End: 2024-03-15
Payer: MEDICARE

## 2024-03-15 DIAGNOSIS — Z79.4 TYPE 2 DIABETES MELLITUS WITH INSULIN THERAPY (MULTI): Primary | ICD-10-CM

## 2024-03-15 DIAGNOSIS — E11.9 TYPE 2 DIABETES MELLITUS WITH INSULIN THERAPY (MULTI): Primary | ICD-10-CM

## 2024-03-15 PROCEDURE — RXMED WILLOW AMBULATORY MEDICATION CHARGE

## 2024-03-15 ASSESSMENT — ENCOUNTER SYMPTOMS: DIABETIC ASSOCIATED SYMPTOMS: 0

## 2024-03-15 NOTE — PROGRESS NOTES
Pharmacy [facility]   Follow Up  Patient is sent at the request of Alma Pavon MD for medication management.  My final recommendations will be communicated back to the requesting provider by way of shared medical record.  Subjective   Diabetes  She presents for her follow-up diabetic visit. She has type 2 diabetes mellitus. Her disease course has been improving. There are no hypoglycemic associated symptoms. There are no diabetic associated symptoms. There are no hypoglycemic complications. Symptoms are stable. Risk factors for coronary artery disease include diabetes mellitus, dyslipidemia, hypertension and obesity. She is compliant with treatment all of the time. Her overall blood glucose range is 140-180 mg/dl. An ACE inhibitor/angiotensin II receptor blocker is not being taken. Eye exam is current (Patient just saw her optometrist for her reitonpathy and she notes they are pleased with her progress.).     Cardiovascular risk factors include advanced age (older than 55 for men, 65 for women), diabetes mellitus, dyslipidemia, hypertension, and obesity (BMI >= 30 kg/m2). The patient is not on an ACE inhibitor or angiotensin II receptor blocker.  The patient has not been previously hospitalized due to diabetic ketoacidosis.     At last visit, we titrated up her Ozempic to 1 mg weekly and titrated her Lantus down to 35 units. Patient presents today via telephone to assess glycemic control and maybe adjust insulin and/or Ozempic. Of note, patient's daughter has recently passed away so her diet has been a little off and she stopped going swimming.     Pharmacotherapy   Lantus solostar 35 units subcutaneous once daily AM  Humalog KwikPen 10 units TID AC   Ozempic 1 mg inject 1 mg under the skin once a week on Wednesdays -- Doses taken: 2  Jardiance 25 mg once daily    Previous Pharmacotherapy    Humulin N insulin   Metformin - d/c'ed by Dr. Shelley in 2020     Current monitoring regimen:  Patient is using  continuous glucose monitor; FreeStyle Cory 2; not connected to smartphone     Reported SMBG Measurements  14 day average: 204 mg/dL  Time Range BG (mg/dL)   6a -12p 192   12p - 6p 197   6p -12a 233   TIR: 37%  High: 63%  Low: 0%    Previous BG Measurements  2/23/24 -- 14 day average: 174 mg/dL  Time Range BG (mg/dL)   6a -12p 169   12p - 6p 159   6p -12a 198   TIR: 52%  High (181-249): 48%  Low (54-69): 0%      2/2/24 -- 14 day average: 210 mg/dL  Time Range BG (mg/dL)   6a -12p 229   12p - 6p 208   6p -12a 206       1/19/24 -- 14 day average: 187 mg/dL  Time Range BG (mg/dL)   6a -12p 191   12p - 6p 171   6p -12a 205   TIR: 58%; High (181-249): 42%      Any episodes of hypoglycemia? Patient notes that since the passing of her daughter, she noticed a few low blood sugar readings 2 hours after breakfast.     Physical Activity: Goes to a virtual exercise class 3 times a week and will go back to swimming 2 times a week next week    Adverse Effects:   Patient denies any GI adverse effects (Upset stomach/diarrhea/constipation/nausea/vomiting)  Patient denies any urinary side effects from the Jardiance     Reported Weight:  Baseline: 174 lbs   Current: 150 lbs     Adherence: no issues reported   Affordability/Accessibility: Patient has met her deductible so now her Ozempic is $30/month.    Risk Reducing Meds  On ACEi/ARB: No   On Statin: Yes   On SGLT2i: Yes   On GLP1-RA: Yes     Objective   Labs:   Lab Results   Component Value Date    BILITOT 1.2 12/16/2023    CALCIUM 10.4 01/12/2024    CO2 31 01/12/2024    CL 99 01/12/2024    CREATININE 1.50 (H) 01/12/2024    GLUCOSE 71 (L) 01/12/2024    ALKPHOS 70 12/16/2023    K 4.1 01/12/2024    PROT 7.4 12/16/2023     01/12/2024    AST 18 12/16/2023    ALT 18 12/16/2023    BUN 27 (H) 01/12/2024    ANIONGAP 15 01/12/2024    MG 2.67 (H) 12/16/2023    PHOS 3.9 01/12/2024    ALBUMIN 4.8 01/12/2024    GFRF 37 (A) 09/16/2023     Lab Results   Component Value Date    TRIG 171 (H)  12/16/2023    CHOL 185 12/16/2023    LDLCALC 102 (H) 12/16/2023    HDL 49.3 12/16/2023     Lab Results   Component Value Date    HGBA1C 6.6 (H) 12/16/2023    HGBA1C 8.5 (A) 09/16/2023    HGBA1C 8.4 (A) 06/07/2023       Assessment/Plan   Problem List Items Addressed This Visit          Endocrine/Metabolic    Type 2 diabetes mellitus with insulin therapy (CMS/[facility]) - Primary   Patients diabetes is controlled with most recent A1c of 6.6% (goal < 7 %). Glycemic control is estimated to be not at goal. Patient has been tolerating both Ozempic and Jardiance with no side effects.     Since titrating Ozempic and decreasing Lantus and patient's daughter passing, patient's blood sugars have gone up slightly since we last spoke. Patient has not experienced low blood sugar readings anymore. Patient does admit to losing about 25 lbs since starting Ozempic. Will have patient continue on current doses and give Ozempic 1 mg a little bit more time to lower blood sugars.     Patient will be getting her A1c checked a week prior to her appt with her PCP on 3/25/24. Anticipate it to be a little higher than in December.     Patient has not been going swimming since the passing of her daughter, but notes she will start back up next week and go twice a week. She continues to do her virtual exercise class 3 times a week.     Future Consideration: Would like to essentially get patient off of at least the meal time insulin and have her titrated to max dose Ozempic along with continuing her weekly physical activity regimen.     PLAN  Continue Jardiance 25 mg daily, Humalog 10 units with meals, Lantus 35 units once daily and Ozempic 1 mg once weekly   Follow up:  5 weeks  by telephone   Strongly encouraged patient to reach out with questions or frequently low BG's prior to next appointment.     Elma Connor, PharmD    Continue all meds under the continuation of care with the referring provider and clinical pharmacy team.

## 2024-03-18 ENCOUNTER — LAB (OUTPATIENT)
Dept: LAB | Facility: LAB | Age: 79
End: 2024-03-18
Payer: MEDICARE

## 2024-03-18 DIAGNOSIS — Z79.4 TYPE 2 DIABETES MELLITUS WITH INSULIN THERAPY (MULTI): ICD-10-CM

## 2024-03-18 DIAGNOSIS — E11.9 TYPE 2 DIABETES MELLITUS WITH INSULIN THERAPY (MULTI): ICD-10-CM

## 2024-03-18 LAB
EST. AVERAGE GLUCOSE BLD GHB EST-MCNC: 174 MG/DL
HBA1C MFR BLD: 7.7 %

## 2024-03-18 PROCEDURE — 36415 COLL VENOUS BLD VENIPUNCTURE: CPT

## 2024-03-18 PROCEDURE — 83036 HEMOGLOBIN GLYCOSYLATED A1C: CPT

## 2024-03-19 ENCOUNTER — PHARMACY VISIT (OUTPATIENT)
Dept: PHARMACY | Facility: CLINIC | Age: 79
End: 2024-03-19
Payer: MEDICARE

## 2024-03-24 NOTE — PROGRESS NOTES
Subjective   Patient ID: Eleonora Varela is a 78 y.o. female who presents for Follow-up (Pt here for  follow up. Pt saw Dr Smith March 4th and she lowered the hydrochlorothiazide to half. ).  LAST VISIT PLAN 2/19/24  Eleonora was seen today for a work in visit.  Eleonora was seen today for follow-up.  Diagnoses and all orders for this visit:  Grief at loss of child (Primary)  Type 2 diabetes mellitus with insulin therapy (CMS/[facility])  Stage 3b chronic kidney disease (CMS/[facility])  Plan:  It is ok to advance on the dose of ozempic at the appropriate time, which sounds like it will be next week. Or the week after.    Watch sugars very carefully when you do this, and adjust down insulin/contact pharm D or myself if running lower.    Keep March 25 appt here, call if problems.  END INFO FROM PRIOR VISIT  HPI  Here to follow up on gried, htn, pre clinical cad, dm2 hld.  Was under a lot of stress and out of town due to dtrs death and not surprising her A1c is up.  Dr smith lowered her hydrochlorothiazide by 50%  Is on 1mg with ozempic, next adjustment will be April 19. To move it up.    Was able to get her dtrs ashes. Wanted me to look at death certificate-cirrhosis    Other dtr dissolution/divorce process not going well.    GLP-1 agonists (Trulicity, Victoza, Ozempic, Wegovy, Rybelsus, Bydureon): No nausea, vomiting, abdominal pain and anterior neck pain/swelling.     SGLT-2 inhibitors (Farxiga, Jardiance, Invokana): No hematuria, dysuria, or yeast infections.     Mood is doing ok.  No cp palpitations sob n odizziness.  Sleeping ok.    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    Scribe Transcription:  She denies abdominal pain and nausea. Her HCTZ was dropped March 4. Her bp is now running 114-130 systolic. She denies any urinary issues or yeast infections.     Cardiac: No CP , palpitations, increased snyder  Resp: No sob, wheezing, cough  Extremities: No leg or ankle swelling, no claudication  Neuro: No dizziness,  syncope, blurred vision. No new headaches.     GLP-1 agonists (Trulicity, Victoza, Ozempic, Wegovy, Rybelsus, Bydureon): No nausea, vomiting, abdominal pain and anterior neck pain/swelling.     Scribe Attestation  By signing my name below, I, Boyd Montillase, Sukhdev   attest that this documentation has been prepared under the direction and in the presence of Jennifer Kenney MD.   Review of Systems  See ROS in HPI    Current Outpatient Medications   Medication Instructions    amLODIPine (NORVASC) 10 mg, oral, Daily    aspirin 81 mg EC tablet 1 tablet, oral, Daily    blood sugar diagnostic strip 1 strip, miscellaneous, 3 times daily, ONE TOUCH ULTRA 2 TEST STRIPS    carvedilol (COREG) 25 mg, oral, 2 times daily with meals    cholecalciferol (VITAMIN D-3) 25 mcg, oral, Daily    cyanocobalamin, vitamin B-12, 1,000 mcg tablet, sublingual sublingual, one tab sublingual daily five days per week, monday thru friday    diabetic supplies, miscellan. misc miscellaneous, Onetouch ultrasoft lancets misc    empagliflozin (Jardiance) 25 mg TAKE 1 TABLET BY MOUTH EVERY MORNING    ezetimibe (ZETIA) 10 mg, oral, Daily, DAILY    fluvastatin (LESCOL) 40 mg, oral, Nightly    FreeStyle Cory sensor system (FreeStyle Cory 2 Sensor) kit Use as instructed    hydroCHLOROthiazide (HYDRODIURIL) 12.5 mg, oral, Daily, DAILY    insulin lispro (HUMALOG U-100 INSULIN) 10 Units, subcutaneous, 3 times daily with meals, Take as directed per insulin instructions.    lancets (MICROLET LANCET MISC) miscellaneous, Test sugar 6 times a day    Lantus Solostar U-100 Insulin 35 Units, subcutaneous, Every morning, Take as directed per insulin instructions.    loratadine (CLARITIN REDITABS) 10 mg, oral, Daily    MAGNESIUM CITRATE ORAL 1 tablet, oral, Nightly, MAGNESIUM CITRATE 200 MG    meclizine (ANTIVERT) 12.5 mg, oral, Every 24 hours    omega-3/dha/epa/fish oil (OMEGA-3 ORAL) 1 capsule, oral, 2 times daily, 1,000 MG 2 TIMES A DAY    Ozempic 1 mg,  "subcutaneous, Weekly    psyllium (Metamucil) 3.4 gram packet oral, Nightly, Dissolve 1 tsp in 8 oz of water and drink daily at bedtime and not near other medication, forconstipation     Allergies   Allergen Reactions    Propoxyphene N-Acetaminophen Drowsiness    Adhesive Tape-Silicones Other     Steristrips - takes skin off    Cefaclor Palpitations    Latex Rash     Objective   Lab Results   Component Value Date    WBC 6.7 12/04/2023    HGB 16.2 (H) 12/04/2023    HCT 48.1 (H) 12/04/2023     12/04/2023    CHOL 185 12/16/2023    TRIG 171 (H) 12/16/2023    HDL 49.3 12/16/2023    ALT 18 12/16/2023    AST 18 12/16/2023     01/12/2024    K 4.1 01/12/2024    CL 99 01/12/2024    CREATININE 1.50 (H) 01/12/2024    BUN 27 (H) 01/12/2024    CO2 31 01/12/2024    TSH 1.48 05/19/2023    INR 0.9 05/09/2022    HGBA1C 7.7 (H) 03/18/2024     .lastgfr    Physical ExamBP 126/64 (BP Location: Left arm, Patient Position: Sitting)   Pulse 72   Resp 18   Ht 1.499 m (4' 11\")   Wt 68.9 kg (152 lb)   BMI 30.70 kg/m²       12/18/2023    10:32 AM 12/18/2023    11:48 AM 12/19/2023     3:45 PM 2/19/2024    11:35 AM 3/4/2024     8:27 AM 3/25/2024     1:32 PM 3/25/2024     2:17 PM   Vitals   Systolic 146 134 185 156 138 104 126   Diastolic 74 68 75 78 76 50 64   Heart Rate 77 60 68 72 76 72    Temp     36.5 °C (97.7 °F)     Resp    18 16 18    Height (in) 1.511 m (4' 11.5\")  1.524 m (5') 1.499 m (4' 11\") 1.499 m (4' 11\") 1.499 m (4' 11\")    Weight (lb) 161.4  161 154 150 152    BMI 32.05 kg/m2  31.44 kg/m2 31.1 kg/m2 30.3 kg/m2 30.7 kg/m2    BSA (m2) 1.75 m2  1.76 m2 1.71 m2 1.68 m2 1.69 m2    Visit Report Report Report Report Report Report Report Report     Home bp's reviewed  Patient looks well and is in no obvious distress.  HEENT:   Normocephalic, no facial asymmetry  Eyes: Sclera and conjunctiva are clear.  Neck: No adenopathy cervical (Ant/post/lat), no Supraclavicular nodes.   Thyroid normal.   Carotid pulses normal, no " carotid bruits.  Lungs : RR normal. Clear to auscultation anterior, posterior and lateral. No rales, wheezes rhonchi or rubs. Good air exchange.  Heart: RRR. Same systolic  Murmur,1/6: No gallop, click or rub.  Vascular:  Posterior tibialis  pulses within normal limits bilaterally.   Extremities: no upper extremity edema. No lower extremity edema.   Musculoskeletal: no synovitis of joints seen. No new deformity.  Neuro: CN 2-12 intact. Alert, appropriate.   Ambulates independently.  No gross motor deficit.   No tremors.  Psych: normal mood and affect.  Skin: No rash, bruising petechiae or jaundice.      Eleonora was seen today for follow-up.  Diagnoses and all orders for this visit:  Essential hypertension (Primary)  Need for hepatitis C screening test  -     Cancel: Hepatitis C antibody; Future  -     Hepatitis C antibody; Future  Kappa light chain disease (CMS/HCC)  Atherosclerosis of renal artery (CMS/HCC)  Hyperlipidemia associated with type 2 diabetes mellitus (CMS/HCC)  Grief at loss of child  History of stent insertion of renal artery  Type 2 diabetes mellitus with insulin therapy (CMS/HCC)  Moderate nonproliferative diabetic retinopathy of both eyes without macular edema associated with type 2 diabetes mellitus (CMS/HCC)  Stage 3b chronic kidney disease (CMS/HCC)  -     Renal function panel; Future  Stressful life event affecting family    She is feeling better mood wise, is active in support of her dtr and granddtr as they go through a divorce.   Sugars have been up some but suspect this was related more to the events surrounding her dtr gilbert's death and she is getting back on track.  She is on less hydrochlorothiazide per dr Pavon her cinema cardiologist.  Is on track to see heme about the kappa light chain disease   Her eyes retinopathy are improving. Per pt.    See wrap up section for patient instructions and  additional treatment plan.   Will monitor renal fxn with sglt2i and glp 1 and ckd3b  See  orders. She will wait to see if order from dr harris.  Keep follow up here.    I am the primary care physician for this patient's ongoing medical care, which is managed during and in between office visits.

## 2024-03-25 ENCOUNTER — OFFICE VISIT (OUTPATIENT)
Dept: PRIMARY CARE | Facility: CLINIC | Age: 79
End: 2024-03-25
Payer: MEDICARE

## 2024-03-25 VITALS
RESPIRATION RATE: 18 BRPM | SYSTOLIC BLOOD PRESSURE: 126 MMHG | HEIGHT: 59 IN | WEIGHT: 152 LBS | HEART RATE: 72 BPM | BODY MASS INDEX: 30.64 KG/M2 | DIASTOLIC BLOOD PRESSURE: 64 MMHG

## 2024-03-25 DIAGNOSIS — Z11.59 NEED FOR HEPATITIS C SCREENING TEST: ICD-10-CM

## 2024-03-25 DIAGNOSIS — Z63.4 GRIEF AT LOSS OF CHILD: ICD-10-CM

## 2024-03-25 DIAGNOSIS — E11.69 HYPERLIPIDEMIA ASSOCIATED WITH TYPE 2 DIABETES MELLITUS (MULTI): ICD-10-CM

## 2024-03-25 DIAGNOSIS — E78.5 HYPERLIPIDEMIA ASSOCIATED WITH TYPE 2 DIABETES MELLITUS (MULTI): ICD-10-CM

## 2024-03-25 DIAGNOSIS — F43.21 GRIEF AT LOSS OF CHILD: ICD-10-CM

## 2024-03-25 DIAGNOSIS — Z79.4 TYPE 2 DIABETES MELLITUS WITH INSULIN THERAPY (MULTI): ICD-10-CM

## 2024-03-25 DIAGNOSIS — E11.3393 MODERATE NONPROLIFERATIVE DIABETIC RETINOPATHY OF BOTH EYES WITHOUT MACULAR EDEMA ASSOCIATED WITH TYPE 2 DIABETES MELLITUS (MULTI): ICD-10-CM

## 2024-03-25 DIAGNOSIS — I70.1 ATHEROSCLEROSIS OF RENAL ARTERY (CMS-HCC): ICD-10-CM

## 2024-03-25 DIAGNOSIS — I10 ESSENTIAL HYPERTENSION: Primary | ICD-10-CM

## 2024-03-25 DIAGNOSIS — E11.9 TYPE 2 DIABETES MELLITUS WITH INSULIN THERAPY (MULTI): ICD-10-CM

## 2024-03-25 DIAGNOSIS — Z63.79 STRESSFUL LIFE EVENT AFFECTING FAMILY: ICD-10-CM

## 2024-03-25 DIAGNOSIS — Z98.890 HISTORY OF STENT INSERTION OF RENAL ARTERY: ICD-10-CM

## 2024-03-25 DIAGNOSIS — D89.89 KAPPA LIGHT CHAIN DISEASE (MULTI): ICD-10-CM

## 2024-03-25 DIAGNOSIS — N18.32 STAGE 3B CHRONIC KIDNEY DISEASE (MULTI): ICD-10-CM

## 2024-03-25 PROCEDURE — 1159F MED LIST DOCD IN RCRD: CPT | Performed by: INTERNAL MEDICINE

## 2024-03-25 PROCEDURE — 99214 OFFICE O/P EST MOD 30 MIN: CPT | Performed by: INTERNAL MEDICINE

## 2024-03-25 PROCEDURE — 1160F RVW MEDS BY RX/DR IN RCRD: CPT | Performed by: INTERNAL MEDICINE

## 2024-03-25 PROCEDURE — G2211 COMPLEX E/M VISIT ADD ON: HCPCS | Performed by: INTERNAL MEDICINE

## 2024-03-25 PROCEDURE — 3078F DIAST BP <80 MM HG: CPT | Performed by: INTERNAL MEDICINE

## 2024-03-25 PROCEDURE — 1126F AMNT PAIN NOTED NONE PRSNT: CPT | Performed by: INTERNAL MEDICINE

## 2024-03-25 PROCEDURE — 3074F SYST BP LT 130 MM HG: CPT | Performed by: INTERNAL MEDICINE

## 2024-03-25 SDOH — SOCIAL STABILITY - SOCIAL INSECURITY: DISSAPEARANCE AND DEATH OF FAMILY MEMBER: Z63.4

## 2024-03-25 ASSESSMENT — PAIN SCALES - GENERAL: PAINLEVEL: 0-NO PAIN

## 2024-03-25 NOTE — PATIENT INSTRUCTIONS
Blood test, does not have to be fasting, Kayleen: wait and see if Dr harris orders lab and you can do these at the same time. If he does not order blood tests, do these before your June 18 appt. With me.  Renal panel and screening for hepatitis C.    Keep next appt.

## 2024-04-13 ENCOUNTER — PHARMACY VISIT (OUTPATIENT)
Dept: PHARMACY | Facility: CLINIC | Age: 79
End: 2024-04-13
Payer: MEDICARE

## 2024-04-13 PROCEDURE — RXMED WILLOW AMBULATORY MEDICATION CHARGE

## 2024-04-15 DIAGNOSIS — E78.5 HYPERLIPIDEMIA ASSOCIATED WITH TYPE 2 DIABETES MELLITUS (MULTI): ICD-10-CM

## 2024-04-15 DIAGNOSIS — E11.69 HYPERLIPIDEMIA ASSOCIATED WITH TYPE 2 DIABETES MELLITUS (MULTI): ICD-10-CM

## 2024-04-15 RX ORDER — FLUVASTATIN 40 MG/1
40 CAPSULE ORAL NIGHTLY
Qty: 90 CAPSULE | Refills: 1 | Status: SHIPPED | OUTPATIENT
Start: 2024-04-15

## 2024-04-19 ENCOUNTER — TELEMEDICINE (OUTPATIENT)
Dept: PHARMACY | Facility: HOSPITAL | Age: 79
End: 2024-04-19
Payer: MEDICARE

## 2024-04-19 DIAGNOSIS — E11.9 TYPE 2 DIABETES MELLITUS WITH INSULIN THERAPY (MULTI): Primary | ICD-10-CM

## 2024-04-19 DIAGNOSIS — Z79.4 TYPE 2 DIABETES MELLITUS WITH INSULIN THERAPY (MULTI): Primary | ICD-10-CM

## 2024-04-19 PROCEDURE — RXMED WILLOW AMBULATORY MEDICATION CHARGE

## 2024-04-19 ASSESSMENT — ENCOUNTER SYMPTOMS: DIABETIC ASSOCIATED SYMPTOMS: 0

## 2024-04-19 NOTE — PROGRESS NOTES
Pharmacy [facility]   Follow Up  Patient is sent at the request of Alma Pavon MD for medication management.  My final recommendations will be communicated back to the requesting provider by way of shared medical record.  Subjective   Diabetes  She presents for her follow-up diabetic visit. She has type 2 diabetes mellitus. Her disease course has been improving. There are no hypoglycemic associated symptoms. There are no diabetic associated symptoms. There are no hypoglycemic complications. Symptoms are stable. Risk factors for coronary artery disease include diabetes mellitus, dyslipidemia, hypertension and obesity. She is compliant with treatment all of the time. Her overall blood glucose range is 140-180 mg/dl. An ACE inhibitor/angiotensin II receptor blocker is not being taken. Eye exam is current (Patient just saw her optometrist for her reitonpathy and she notes they are pleased with her progress.).     Cardiovascular risk factors include advanced age (older than 55 for men, 65 for women), diabetes mellitus, dyslipidemia, hypertension, and obesity (BMI >= 30 kg/m2). The patient is not on an ACE inhibitor or angiotensin II receptor blocker.  The patient has not been previously hospitalized due to diabetic ketoacidosis.     At last visit, we titrated up her Ozempic to 1 mg weekly and titrated her Lantus down to 35 units. Patient presents today via telephone to assess glycemic control and maybe adjust insulin and/or Ozempic. Of note, patient's daughter has recently passed away so her diet has been a little off and she stopped going swimming.     Pharmacotherapy   Lantus solostar 35 units subcutaneous once daily AM  Humalog KwikPen 10 units TID AC   Ozempic 1 mg inject 1 mg under the skin once a week on  -- Doses remainin  Jardiance 25 mg once daily    Previous Pharmacotherapy    Humulin N insulin   Metformin - d/c'ed by Dr. Shelley in      Current monitoring regimen:  Patient is using  continuous glucose monitor; FreeStyle Cory 2; not connected to smartphone     Reported SMBG Measurements   and states they have been running consistently elevated       Previous BG Measurements  3/15/24 -- 14 day average: 204 mg/dL  Time Range BG (mg/dL)   6a -12p 192   12p - 6p 197   6p -12a 233   TIR: 37%  High: 63%  Low: 0%    2/23/24 -- 14 day average: 174 mg/dL  Time Range BG (mg/dL)   6a -12p 169   12p - 6p 159   6p -12a 198   TIR: 52%  High (181-249): 48%  Low (54-69): 0%      2/2/24 -- 14 day average: 210 mg/dL  Time Range BG (mg/dL)   6a -12p 229   12p - 6p 208   6p -12a 206       1/19/24 -- 14 day average: 187 mg/dL  Time Range BG (mg/dL)   6a -12p 191   12p - 6p 171   6p -12a 205   TIR: 58%; High (181-249): 42%      Any episodes of hypoglycemia? Patient notes that since the passing of her daughter, she noticed a few low blood sugar readings 2 hours after breakfast.     Physical Activity: Goes to a virtual exercise class 3 times a week and will go back to swimming 2 times a week next week    Adverse Effects:   Patient denies any GI adverse effects (Upset stomach/diarrhea/constipation/nausea/vomiting)  Patient denies any urinary side effects from the Jardiance     Reported Weight:  Baseline: 174 lbs   Current: 150 lbs     Adherence: no issues reported   Affordability/Accessibility: Patient has met her deductible so now her Ozempic is $30/month.    Risk Reducing Meds  On ACEi/ARB: No   On Statin: Yes   On SGLT2i: Yes   On GLP1-RA: Yes     Objective   Labs:   Lab Results   Component Value Date    BILITOT 1.2 12/16/2023    CALCIUM 10.4 01/12/2024    CO2 31 01/12/2024    CL 99 01/12/2024    CREATININE 1.50 (H) 01/12/2024    GLUCOSE 71 (L) 01/12/2024    ALKPHOS 70 12/16/2023    K 4.1 01/12/2024    PROT 7.4 12/16/2023     01/12/2024    AST 18 12/16/2023    ALT 18 12/16/2023    BUN 27 (H) 01/12/2024    ANIONGAP 15 01/12/2024    MG 2.67 (H) 12/16/2023    PHOS 3.9 01/12/2024    ALBUMIN 4.8 01/12/2024     GFRF 37 (A) 09/16/2023     Lab Results   Component Value Date    TRIG 171 (H) 12/16/2023    CHOL 185 12/16/2023    LDLCALC 102 (H) 12/16/2023    HDL 49.3 12/16/2023     Lab Results   Component Value Date    HGBA1C 7.7 (H) 03/18/2024    HGBA1C 6.6 (H) 12/16/2023    HGBA1C 8.5 (A) 09/16/2023       Assessment/Plan   Problem List Items Addressed This Visit          Endocrine/Metabolic    Type 2 diabetes mellitus with insulin therapy (Multi) - Primary    Relevant Medications    semaglutide 2 mg/dose (8 mg/3 mL) pen injector (Start on 4/21/2024)     Patients diabetes is worsening with most recent A1c of 7.7% (goal < 7 %) up from 6.6% in December. Patient's SMBG are not at goal. Of note, patient's daughter has recently passed so it has taken a toll on her health. Patient has been tolerating both Ozempic and Jardiance with no side effects.     Since patient's daughter passing, patient's blood sugars have gone up along with an increased A1c. Patient has not experienced low blood sugar readings anymore. Patient does admit to losing about 25 lbs since starting Ozempic. Will have patient titrate Ozempic up to 2 mg today and will adjust insulin at follow up since patient has started back up swimming regularly twice a week since the passing of her daughter and has been doing virtual dynamic stretch exercises ([employer]) three times a week with her sister.     Future Consideration: Would like to essentially get patient off of at least the meal time insulin and have her titrated to max dose Ozempic along with continuing her weekly physical activity regimen.     Plan:  INCREASE  - Ozempic 1 mg to 2 mg once weekly     CONTINUE  - Jardiance 25 mg daily (eGFR 36)  - Humalog 10 units with meals  - Lantus 35 units once daily       Labs ordered: none     Follow up: 5/17/24  [facility] Follow Up: 6/10/24    Provided contact info and encouraged patient to reach out with any concerns or questions regarding medications.        Elma Connor,  PharmD    Continue all meds under the continuation of care with the referring provider and clinical pharmacy team.

## 2024-04-22 ENCOUNTER — PHARMACY VISIT (OUTPATIENT)
Dept: PHARMACY | Facility: CLINIC | Age: 79
End: 2024-04-22
Payer: MEDICARE

## 2024-05-09 DIAGNOSIS — Z98.890 HISTORY OF STENT INSERTION OF RENAL ARTERY: ICD-10-CM

## 2024-05-15 PROCEDURE — RXMED WILLOW AMBULATORY MEDICATION CHARGE

## 2024-05-17 ENCOUNTER — PHARMACY VISIT (OUTPATIENT)
Dept: PHARMACY | Facility: CLINIC | Age: 79
End: 2024-05-17
Payer: MEDICARE

## 2024-05-17 ENCOUNTER — TELEMEDICINE (OUTPATIENT)
Dept: PHARMACY | Facility: HOSPITAL | Age: 79
End: 2024-05-17
Payer: MEDICARE

## 2024-05-17 DIAGNOSIS — E11.9 TYPE 2 DIABETES MELLITUS WITH INSULIN THERAPY (MULTI): Primary | ICD-10-CM

## 2024-05-17 DIAGNOSIS — Z79.4 TYPE 2 DIABETES MELLITUS WITH INSULIN THERAPY (MULTI): Primary | ICD-10-CM

## 2024-05-17 ASSESSMENT — ENCOUNTER SYMPTOMS: DIABETIC ASSOCIATED SYMPTOMS: 0

## 2024-05-17 NOTE — PROGRESS NOTES
Pharmacy [facility]   Follow Up  Patient is sent at the request of Alma Pavon MD for medication management.  My final recommendations will be communicated back to the requesting provider by way of shared medical record.  Subjective   Diabetes  She presents for her follow-up diabetic visit. She has type 2 diabetes mellitus. Her disease course has been improving. There are no hypoglycemic associated symptoms. There are no diabetic associated symptoms. There are no hypoglycemic complications. Symptoms are stable. Risk factors for coronary artery disease include diabetes mellitus, dyslipidemia, hypertension and obesity. She is compliant with treatment all of the time. Her overall blood glucose range is 140-180 mg/dl. An ACE inhibitor/angiotensin II receptor blocker is not being taken. Eye exam is current (Patient just saw her optometrist for her reitonpathy and she notes they are pleased with her progress.).     Cardiovascular risk factors include advanced age (older than 55 for men, 65 for women), diabetes mellitus, dyslipidemia, hypertension, and obesity (BMI >= 30 kg/m2). The patient is not on an ACE inhibitor or angiotensin II receptor blocker.  The patient has not been previously hospitalized due to diabetic ketoacidosis.     At last visit, we titrated up her Ozempic to 1 mg weekly and titrated her Lantus down to 35 units. Patient presents today via telephone to assess glycemic control and maybe adjust insulin and/or Ozempic. Of note, patient's daughter has recently passed away so her diet has been a little off and she stopped going swimming.     Pharmacotherapy   Lantus solostar 35 units subcutaneous once daily AM  Humalog KwikPen 10 units TID AC   Ozempic 2 mg inject 2 mg under the skin once a week on  -- Doses remainin  Jardiance 25 mg once daily    Previous Pharmacotherapy    Humulin N insulin   Metformin - d/c'ed by Dr. Shelley in      Current monitoring regimen:  Patient is using  continuous glucose monitor; FreeStyle Cory 2; not connected to smartphone     Reported SMBG Measurements  - This morning 160    5/17/24 -- 14 day average: 184 mg/dL  Time Range BG (mg/dL)   6a -12p 199   12p - 6p 161   6p -12a 184       Previous BG Measurements  3/15/24 -- 14 day average: 204 mg/dL  Time Range BG (mg/dL)   6a -12p 192   12p - 6p 197   6p -12a 233   TIR: 37%  High: 63%  Low: 0%    2/23/24 -- 14 day average: 174 mg/dL  Time Range BG (mg/dL)   6a -12p 169   12p - 6p 159   6p -12a 198   TIR: 52%  High (181-249): 48%  Low (54-69): 0%      2/2/24 -- 14 day average: 210 mg/dL  Time Range BG (mg/dL)   6a -12p 229   12p - 6p 208   6p -12a 206       1/19/24 -- 14 day average: 187 mg/dL  Time Range BG (mg/dL)   6a -12p 191   12p - 6p 171   6p -12a 205   TIR: 58%; High (181-249): 42%      Any episodes of hypoglycemia? Patient notes that since the passing of her daughter, she noticed a few low blood sugar readings 2 hours after breakfast.     Physical Activity: Goes to a virtual exercise class 3 times a week and will go back to swimming 2 times a week next week    Adverse Effects:   Patient denies any GI adverse effects (Upset stomach/diarrhea/constipation/nausea/vomiting)  Patient denies any urinary side effects from the Jardiance     Reported Weight:  Baseline: 174 lbs   Current: 150 lbs     Adherence: no issues reported   Affordability/Accessibility: Patient has met her deductible so now her Ozempic is $30/month.    Objective   Labs:   Lab Results   Component Value Date    BILITOT 1.2 12/16/2023    CALCIUM 10.4 01/12/2024    CO2 31 01/12/2024    CL 99 01/12/2024    CREATININE 1.50 (H) 01/12/2024    GLUCOSE 71 (L) 01/12/2024    ALKPHOS 70 12/16/2023    K 4.1 01/12/2024    PROT 7.4 12/16/2023     01/12/2024    AST 18 12/16/2023    ALT 18 12/16/2023    BUN 27 (H) 01/12/2024    ANIONGAP 15 01/12/2024    MG 2.67 (H) 12/16/2023    PHOS 3.9 01/12/2024    ALBUMIN 4.8 01/12/2024    GFRF 37 (A) 09/16/2023      Lab Results   Component Value Date    TRIG 171 (H) 12/16/2023    CHOL 185 12/16/2023    LDLCALC 102 (H) 12/16/2023    HDL 49.3 12/16/2023     Lab Results   Component Value Date    HGBA1C 7.7 (H) 03/18/2024    HGBA1C 6.6 (H) 12/16/2023    HGBA1C 8.5 (A) 09/16/2023       Assessment/Plan   Problem List Items Addressed This Visit          Endocrine/Metabolic    Type 2 diabetes mellitus with insulin therapy (Multi) - Primary     Patients diabetes is worsening with most recent A1c of 7.7% (goal < 7 %) up from 6.6% in December. Patient's SMBG are not at goal. Of note, patient's daughter has recently passed so it has taken a toll on her health and caused her BG averages to become elevated. Patient has been tolerating both Ozempic and Jardiance with no side effects.     Since patient's daughter passing, patient's blood sugars have gone up along with an increased A1c. Patient has not experienced low blood sugar readings anymore. Patient does admit to losing about 25 lbs since starting Ozempic. Patient has continued to do virtual dynamic stretch exercises ([employer]) three times a week with her sister and water aerobics the other 2 days of the week.     Future Consideration: Would like to essentially get patient off of at least the meal time insulin and have her titrated to max dose Ozempic along with continuing her weekly physical activity regimen.     Plan:  - Will have patient continue current regimen and give max dose Ozempic to work a little longer, and at fuv will make sure she provides a more extensive glucose report.     CONTINUE  - Jardiance 25 mg daily (eGFR 36)  - Humalog 10 units with meals  - Lantus 35 units once daily  - Ozempic 2 mg once weekly     Labs ordered: none     Follow up: 6/14/24  CINEMA Follow Up: 6/10/24    Provided contact info and encouraged patient to reach out with any concerns or questions regarding medications.        Elma Connor, PharmD    Continue all meds under the continuation of  care with the referring provider and clinical pharmacy team.

## 2024-05-23 ENCOUNTER — LAB (OUTPATIENT)
Dept: LAB | Facility: LAB | Age: 79
End: 2024-05-23
Payer: MEDICARE

## 2024-05-23 DIAGNOSIS — D47.2 MGUS (MONOCLONAL GAMMOPATHY OF UNKNOWN SIGNIFICANCE): ICD-10-CM

## 2024-05-23 DIAGNOSIS — N18.32 STAGE 3B CHRONIC KIDNEY DISEASE (MULTI): ICD-10-CM

## 2024-05-23 DIAGNOSIS — D47.2 MONOCLONAL GAMMOPATHY: ICD-10-CM

## 2024-05-23 DIAGNOSIS — N25.81 SECONDARY HYPERPARATHYROIDISM OF RENAL ORIGIN (MULTI): ICD-10-CM

## 2024-05-23 DIAGNOSIS — Z11.59 NEED FOR HEPATITIS C SCREENING TEST: ICD-10-CM

## 2024-05-23 DIAGNOSIS — N18.30 CHRONIC KIDNEY DISEASE, STAGE 3 UNSPECIFIED (MULTI): ICD-10-CM

## 2024-05-23 DIAGNOSIS — D89.89 KAPPA LIGHT CHAIN DISEASE (MULTI): ICD-10-CM

## 2024-05-23 DIAGNOSIS — I10 ESSENTIAL (PRIMARY) HYPERTENSION: ICD-10-CM

## 2024-05-23 LAB
BASOPHILS # BLD AUTO: 0.05 X10*3/UL (ref 0–0.1)
BASOPHILS NFR BLD AUTO: 0.7 %
EOSINOPHIL # BLD AUTO: 0.19 X10*3/UL (ref 0–0.4)
EOSINOPHIL NFR BLD AUTO: 2.5 %
ERYTHROCYTE [DISTWIDTH] IN BLOOD BY AUTOMATED COUNT: 11.9 % (ref 11.5–14.5)
HCT VFR BLD AUTO: 48.8 % (ref 36–46)
HGB BLD-MCNC: 16.1 G/DL (ref 12–16)
IMM GRANULOCYTES # BLD AUTO: 0.01 X10*3/UL (ref 0–0.5)
IMM GRANULOCYTES NFR BLD AUTO: 0.1 % (ref 0–0.9)
LYMPHOCYTES # BLD AUTO: 2.67 X10*3/UL (ref 0.8–3)
LYMPHOCYTES NFR BLD AUTO: 34.8 %
MCH RBC QN AUTO: 29.4 PG (ref 26–34)
MCHC RBC AUTO-ENTMCNC: 33 G/DL (ref 32–36)
MCV RBC AUTO: 89 FL (ref 80–100)
MONOCYTES # BLD AUTO: 0.54 X10*3/UL (ref 0.05–0.8)
MONOCYTES NFR BLD AUTO: 7 %
NEUTROPHILS # BLD AUTO: 4.22 X10*3/UL (ref 1.6–5.5)
NEUTROPHILS NFR BLD AUTO: 54.9 %
NRBC BLD-RTO: 0 /100 WBCS (ref 0–0)
PLATELET # BLD AUTO: 219 X10*3/UL (ref 150–450)
RBC # BLD AUTO: 5.48 X10*6/UL (ref 4–5.2)
WBC # BLD AUTO: 7.7 X10*3/UL (ref 4.4–11.3)

## 2024-05-23 PROCEDURE — 85025 COMPLETE CBC W/AUTO DIFF WBC: CPT

## 2024-05-23 PROCEDURE — 80053 COMPREHEN METABOLIC PANEL: CPT

## 2024-05-23 PROCEDURE — 86334 IMMUNOFIX E-PHORESIS SERUM: CPT

## 2024-05-23 PROCEDURE — 84100 ASSAY OF PHOSPHORUS: CPT

## 2024-05-23 PROCEDURE — 86803 HEPATITIS C AB TEST: CPT

## 2024-05-23 PROCEDURE — 36415 COLL VENOUS BLD VENIPUNCTURE: CPT

## 2024-05-23 PROCEDURE — 83521 IG LIGHT CHAINS FREE EACH: CPT

## 2024-05-23 PROCEDURE — 84155 ASSAY OF PROTEIN SERUM: CPT

## 2024-05-23 PROCEDURE — 84165 PROTEIN E-PHORESIS SERUM: CPT

## 2024-05-24 LAB
ALBUMIN SERPL BCP-MCNC: 4.4 G/DL (ref 3.4–5)
ALP SERPL-CCNC: 75 U/L (ref 33–136)
ALT SERPL W P-5'-P-CCNC: 31 U/L (ref 7–45)
ANION GAP SERPL CALC-SCNC: 14 MMOL/L (ref 10–20)
AST SERPL W P-5'-P-CCNC: 25 U/L (ref 9–39)
BILIRUB SERPL-MCNC: 1.1 MG/DL (ref 0–1.2)
BUN SERPL-MCNC: 18 MG/DL (ref 6–23)
CALCIUM SERPL-MCNC: 9.8 MG/DL (ref 8.6–10.6)
CHLORIDE SERPL-SCNC: 99 MMOL/L (ref 98–107)
CO2 SERPL-SCNC: 29 MMOL/L (ref 21–32)
CREAT SERPL-MCNC: 1.42 MG/DL (ref 0.5–1.05)
EGFRCR SERPLBLD CKD-EPI 2021: 38 ML/MIN/1.73M*2
GLUCOSE SERPL-MCNC: 108 MG/DL (ref 74–99)
HCV AB SER QL: NONREACTIVE
KAPPA LC SERPL-MCNC: 2.46 MG/DL (ref 0.33–1.94)
KAPPA LC/LAMBDA SER: 0.96 {RATIO} (ref 0.26–1.65)
LAMBDA LC SERPL-MCNC: 2.55 MG/DL (ref 0.57–2.63)
PHOSPHATE SERPL-MCNC: 3.8 MG/DL (ref 2.5–4.9)
POTASSIUM SERPL-SCNC: 3.8 MMOL/L (ref 3.5–5.3)
PROT SERPL-MCNC: 6.9 G/DL (ref 6.4–8.2)
PROT SERPL-MCNC: 6.9 G/DL (ref 6.4–8.2)
SODIUM SERPL-SCNC: 138 MMOL/L (ref 136–145)

## 2024-05-30 LAB
ALBUMIN: 4.3 G/DL (ref 3.4–5)
ALPHA 1 GLOBULIN: 0.3 G/DL (ref 0.2–0.6)
ALPHA 2 GLOBULIN: 0.7 G/DL (ref 0.4–1.1)
BETA GLOBULIN: 0.8 G/DL (ref 0.5–1.2)
GAMMA GLOBULIN: 0.8 G/DL (ref 0.5–1.4)
IMMUNOFIXATION COMMENT: NORMAL
PATH REVIEW - SERUM IMMUNOFIXATION: NORMAL
PATH REVIEW-SERUM PROTEIN ELECTROPHORESIS: NORMAL
PROTEIN ELECTROPHORESIS COMMENT: NORMAL

## 2024-06-05 ENCOUNTER — OFFICE VISIT (OUTPATIENT)
Dept: CARDIOLOGY | Facility: CLINIC | Age: 79
End: 2024-06-05
Payer: MEDICARE

## 2024-06-05 ENCOUNTER — HOSPITAL ENCOUNTER (OUTPATIENT)
Dept: VASCULAR MEDICINE | Facility: CLINIC | Age: 79
Discharge: HOME | End: 2024-06-05
Payer: MEDICARE

## 2024-06-05 VITALS
OXYGEN SATURATION: 98 % | BODY MASS INDEX: 29.23 KG/M2 | HEIGHT: 59 IN | WEIGHT: 145 LBS | SYSTOLIC BLOOD PRESSURE: 123 MMHG | HEART RATE: 83 BPM | DIASTOLIC BLOOD PRESSURE: 74 MMHG

## 2024-06-05 DIAGNOSIS — I70.1 ATHEROSCLEROSIS OF RENAL ARTERY (CMS-HCC): ICD-10-CM

## 2024-06-05 DIAGNOSIS — Z98.890 HISTORY OF STENT INSERTION OF RENAL ARTERY: Primary | ICD-10-CM

## 2024-06-05 DIAGNOSIS — I10 PRIMARY HYPERTENSION: ICD-10-CM

## 2024-06-05 DIAGNOSIS — Z98.890 HISTORY OF STENT INSERTION OF RENAL ARTERY: ICD-10-CM

## 2024-06-05 PROCEDURE — 99213 OFFICE O/P EST LOW 20 MIN: CPT | Performed by: NURSE PRACTITIONER

## 2024-06-05 PROCEDURE — 1126F AMNT PAIN NOTED NONE PRSNT: CPT | Performed by: NURSE PRACTITIONER

## 2024-06-05 PROCEDURE — 3078F DIAST BP <80 MM HG: CPT | Performed by: NURSE PRACTITIONER

## 2024-06-05 PROCEDURE — 93975 VASCULAR STUDY: CPT | Performed by: SURGERY

## 2024-06-05 PROCEDURE — 1159F MED LIST DOCD IN RCRD: CPT | Performed by: NURSE PRACTITIONER

## 2024-06-05 PROCEDURE — 3074F SYST BP LT 130 MM HG: CPT | Performed by: NURSE PRACTITIONER

## 2024-06-05 PROCEDURE — 1036F TOBACCO NON-USER: CPT | Performed by: NURSE PRACTITIONER

## 2024-06-05 PROCEDURE — 93975 VASCULAR STUDY: CPT

## 2024-06-05 RX ORDER — AMLODIPINE BESYLATE 10 MG/1
10 TABLET ORAL DAILY
Qty: 90 TABLET | Refills: 3 | Status: SHIPPED | OUTPATIENT
Start: 2024-06-05 | End: 2025-06-05

## 2024-06-05 ASSESSMENT — PAIN SCALES - GENERAL: PAINLEVEL: 0-NO PAIN

## 2024-06-05 NOTE — PROGRESS NOTES
Eleonora Varela is a 78 y.o. female     History Of Present Illness   Mrs Varela is a 78 year here for a follow up of her renal artery stenosis.  Prior to this appt, she underwent a renal artery duplex that does show improved velocities.  Her BP is now well controlled.  She denies any complaints of chest pain, shortness of breath, palpitations, dizziness or syncope.  She is consuming a healthy diet and exercises daily.  She is now on ozempic and jardience and has lost weight.        Social HX  Social History     Tobacco Use    Smoking status: Never     Passive exposure: Never    Smokeless tobacco: Never   Vaping Use    Vaping status: Never Used   Substance Use Topics    Alcohol use: Not Currently    Drug use: Never          Family HX  Family History   Problem Relation Name Age of Onset    Prostate cancer Father      Diabetes type II Father      Breast cancer Sister          2 sisters now with breast cancer both post menopausal.    Diabetes type II Sister      Breast cancer Sister  62    Other (acute myocardial infraction) Brother  59        2021    Other (herion addiction) Daughter      Other (alcohol dependence) Daughter      Drug abuse Daughter      No Known Problems Father's Brother      Other (Large b cell lymphoma of lymph nodes on neck) Maternal Grandfather      Other (diffuse large b cell lymphoma of lymph nodes of neck) Half-Sister            Review Of Systems   Constitutional: not feeling tired.   Eyes: no eyesight problems.  No vision loss or change in vision  ENT: no hearing loss and no nosebleeds.   Cardiovascular: No intermittent leg claudication,   No chest pain, no tightness or heavy pressure  No shortness of breath,  No palpitations,  No lower extremity edema  The heart rate is regular  Respiratory: no chronic cough and no shortness of breath.   Gastrointestinal: no change in bowel habits and no blood in stools.   Genitourinary: no urinary frequency.   Skin: no skin rashes.   Neurological: No frequent  falls.   No dizziness  No weakness  Denies headaches  Psychiatric: no depression and not suicidal.   All other systems have been reviewed and are negative for complaint.        Allergies  Allergies   Allergen Reactions    Propoxyphene N-Acetaminophen Drowsiness    Adhesive Tape-Silicones Other     Steristrips - takes skin off    Cefaclor Palpitations    Latex Rash          Vitals  Visit Vitals  /74 (BP Location: Left arm, Patient Position: Sitting)   Pulse 83           Physical Exam  Constitutional: alert and in no acute distress.   Eyes: no erythema, swelling or discharge from the eye .   Neck: neck is supple, symmetric, trachea midline, no masses  and no thyromegaly .   Pulmonary: No increased work of breathing or signs of respiratory distress    Lungs clear to auscultation.    No friction rub.  Cardiovascular: carotid pulses 2+ bilaterally with no bruit    JVP was normal, no thrills ,   Regular rhythm, normal S1 and S2, no murmurs    Pedal pulses 2+ bilaterally    No edema .   Abdomen: abdomen non-tender, no masses  and no hepatomegaly . No pulsatile mass noted  Skin: skin warm and dry, normal skin turgor .   Psychiatric judgment and insight is normal  and oriented to person, place and time .           Current/Home Meds    Current Outpatient Medications:     aspirin 81 mg EC tablet, Take 1 tablet (81 mg) by mouth once daily., Disp: , Rfl:     blood sugar diagnostic strip, 1 strip 3 times a day. ONE TOUCH ULTRA 2 TEST STRIPS, Disp: 270 strip, Rfl: 3    carvedilol (Coreg) 25 mg tablet, Take 1 tablet (25 mg) by mouth 2 times a day with meals., Disp: 180 tablet, Rfl: 1    cholecalciferol (Vitamin D-3) 25 MCG (1000 UT) tablet, Take 1 tablet (25 mcg) by mouth once daily., Disp: , Rfl:     cyanocobalamin, vitamin B-12, 1,000 mcg tablet, sublingual, Place under the tongue. one tab sublingual daily five days per week, monday thru friday, Disp: , Rfl:     diabetic supplies, miscellan. misc, Onetouch ultrasoft lancets  misc, Disp: , Rfl:     empagliflozin (Jardiance) 25 mg, TAKE 1 TABLET BY MOUTH EVERY MORNING, Disp: 30 tablet, Rfl: 11    ezetimibe (Zetia) 10 mg tablet, Take 1 tablet (10 mg) by mouth once daily. DAILY, Disp: 90 tablet, Rfl: 3    fluvastatin (Lescol) 40 mg capsule, Take 1 capsule (40 mg) by mouth once daily at bedtime., Disp: 90 capsule, Rfl: 1    FreeStyle Cory sensor system (FreeStyle Cory 2 Sensor) kit, Use as instructed, Disp: 1 each, Rfl: 0    hydroCHLOROthiazide (HYDRODiuril) 25 mg tablet, Take 0.5 tablets (12.5 mg) by mouth once daily. DAILY, Disp: 90 tablet, Rfl: 3    insulin glargine (Lantus Solostar U-100 Insulin) 100 unit/mL (3 mL) pen, Inject 35 Units under the skin once daily in the morning. Take as directed per insulin instructions., Disp: 31.5 mL, Rfl: 3    insulin lispro (HumaLOG U-100 Insulin) 100 unit/mL injection, Inject 0.1 mL (10 Units) under the skin 3 times a day with meals. Take as directed per insulin instructions., Disp: 10 mL, Rfl: 9    lancets (MICROLET LANCET MISC), Test sugar 6 times a day, Disp: , Rfl:     loratadine (Claritin Reditabs) 10 mg disintegrating tablet, Take 1 tablet (10 mg) by mouth once daily., Disp: , Rfl:     MAGNESIUM CITRATE ORAL, Take 1 tablet by mouth at bedtime. MAGNESIUM CITRATE 200 MG, Disp: , Rfl:     meclizine (Antivert) 12.5 mg tablet, Take 1 tablet (12.5 mg) by mouth once every 24 hours., Disp: , Rfl:     omega-3/dha/epa/fish oil (OMEGA-3 ORAL), Take 1 capsule by mouth in the morning and at bedtime. 1,000 MG 2 TIMES A DAY, Disp: , Rfl:     psyllium (Metamucil) 3.4 gram packet, Take by mouth at bedtime. Dissolve 1 tsp in 8 oz of water and drink daily at bedtime and not near other medication, forconstipation, Disp: , Rfl:     semaglutide 2 mg/dose (8 mg/3 mL) pen injector, Inject 2 mg under the skin 1 (one) time per week., Disp: 3 mL, Rfl: 5    amLODIPine (Norvasc) 10 mg tablet, Take 1 tablet (10 mg) by mouth once daily., Disp: 90 tablet, Rfl: 3        Labs      Cardiac Service Results:  6/5/2024 renal artery duplex scan  Renal Artery Duplex Right Kidney: 9.5 cm                           Left Kidney: 9.9 cm        Systolic       Diastolic     ARTERY           Systolic       Diastolic        64 cm/s         20 cm/s      Origin            66 cm/s        22 cm/s        59 cm/s         19 cm/s       Prox             48 cm/s        16 cm/s        74 cm/s         12 cm/s        Mid             59 cm/s        8 cm/s        36 cm/s         10 cm/s      Distal            54 cm/s        17 cm/s        22 cm/s         9 cm/s      Superior           22 cm/s        8 cm/s        16 cm/s         4 cm/s      Inferior           16 cm/s        6 cm/s                         Right                          Left                          1.5       R/A Ratio            1.3                          0.6    Resistive Index         0.6        Ao Dist Diam 1.10 cm  Mid Ao       51 cm/s        Assessment/Plan    Renal artery stenosis: S/P Left renal artery stent:  Doing very well with improved velocities.  The stent is patent without evidence of stenosis.  BP today is 123/74.  She is to continue her asa, statin, healthy diet and exercise regimen.  Patient to have a repeat renal duplex scan in 6 months then follow up with me after her duplex scan.  She can call me sooner for any questions, concerns or complaints.

## 2024-06-10 ENCOUNTER — OFFICE VISIT (OUTPATIENT)
Dept: CARDIOLOGY | Facility: CLINIC | Age: 79
End: 2024-06-10
Payer: MEDICARE

## 2024-06-10 VITALS
HEART RATE: 75 BPM | SYSTOLIC BLOOD PRESSURE: 114 MMHG | DIASTOLIC BLOOD PRESSURE: 71 MMHG | HEIGHT: 59 IN | OXYGEN SATURATION: 97 % | WEIGHT: 145 LBS | BODY MASS INDEX: 29.23 KG/M2

## 2024-06-10 DIAGNOSIS — Z98.890 HISTORY OF STENT INSERTION OF RENAL ARTERY: ICD-10-CM

## 2024-06-10 DIAGNOSIS — E11.9 TYPE 2 DIABETES MELLITUS WITH INSULIN THERAPY (MULTI): ICD-10-CM

## 2024-06-10 DIAGNOSIS — Z79.4 TYPE 2 DIABETES MELLITUS WITH INSULIN THERAPY (MULTI): ICD-10-CM

## 2024-06-10 DIAGNOSIS — I10 PRIMARY HYPERTENSION: Primary | ICD-10-CM

## 2024-06-10 DIAGNOSIS — E78.49 OTHER HYPERLIPIDEMIA: ICD-10-CM

## 2024-06-10 PROCEDURE — 1159F MED LIST DOCD IN RCRD: CPT | Performed by: INTERNAL MEDICINE

## 2024-06-10 PROCEDURE — 1126F AMNT PAIN NOTED NONE PRSNT: CPT | Performed by: INTERNAL MEDICINE

## 2024-06-10 PROCEDURE — 1157F ADVNC CARE PLAN IN RCRD: CPT | Performed by: INTERNAL MEDICINE

## 2024-06-10 PROCEDURE — 1160F RVW MEDS BY RX/DR IN RCRD: CPT | Performed by: INTERNAL MEDICINE

## 2024-06-10 PROCEDURE — 99214 OFFICE O/P EST MOD 30 MIN: CPT | Performed by: INTERNAL MEDICINE

## 2024-06-10 PROCEDURE — 3074F SYST BP LT 130 MM HG: CPT | Performed by: INTERNAL MEDICINE

## 2024-06-10 PROCEDURE — 3078F DIAST BP <80 MM HG: CPT | Performed by: INTERNAL MEDICINE

## 2024-06-10 ASSESSMENT — ENCOUNTER SYMPTOMS
LOSS OF SENSATION IN FEET: 0
DEPRESSION: 0
OCCASIONAL FEELINGS OF UNSTEADINESS: 0

## 2024-06-10 ASSESSMENT — PAIN SCALES - GENERAL: PAINLEVEL: 0-NO PAIN

## 2024-06-10 NOTE — PROGRESS NOTES
CHIEF COMPLAINT: routine follow-up visit    HISTORY OF PRESENT ILLNESS:    PCP: Dr. Jennifer Kenney  Cardiologists: Dr. Ahsan Cook and Dr. Nj Bennett     Ms. Varela is a 79 yo F with hx as listed below who returns for follow-up visit; last seen by me in 3/2024. Unfortunately, her daughter passed away from addiction recently and she is dealing with that grief. Denies CP, SOB, dizziness, LE edema, or palpitations. No lows at swimming. Former teacher at [employer]. No issues with ozempic injections or side effects from jardiance. Continues to lose some weight. Has lost 30 lbs since starting ozempic.     PAST MEDICAL/SURGICAL HISTORY:  -CKD  -DM with retinopathy  -L renal artery stent for renal artery stenosis (2022): no stenosis and patent stent on surveillance US  -HTN  -DLD  -vertigo  -ASD vs PFO? (heart cath age 14)  -cataract surgery  -MGUS  -osteopenia  -carotid plaque without stenosis  -carpal tunnel surgery     PRIOR CARDIAC TESTING:  -Lexiscan (2023): no ischemia, EF 80%  -Lexiscan (2021): no ischemia  -CAC (2021): 797  -TTE (2015): EF 60-65%, impaired relaxation, aortic sclerosis, mild TR  -Treadmill stress ECG (2015): negative  -TTE (2012): EF 70%, impaired relaxation, mid-cavity obstruction 43mmHg with valsalva    Allergies   Allergen Reactions    Propoxyphene N-Acetaminophen Drowsiness    Adhesive Tape-Silicones Other     Steristrips - takes skin off    Cefaclor Palpitations    Latex Rash     Current Outpatient Medications:     amLODIPine (Norvasc) 10 mg tablet, Take 1 tablet (10 mg) by mouth once daily., Disp: 90 tablet, Rfl: 3    aspirin 81 mg EC tablet, Take 1 tablet (81 mg) by mouth once daily., Disp: , Rfl:     blood sugar diagnostic strip, 1 strip 3 times a day. ONE TOUCH ULTRA 2 TEST STRIPS, Disp: 270 strip, Rfl: 3    carvedilol (Coreg) 25 mg tablet, Take 1 tablet (25 mg) by mouth 2 times a day with meals., Disp: 180 tablet, Rfl: 1    cholecalciferol (Vitamin D-3) 25 MCG (1000 UT) tablet, Take 1  tablet (25 mcg) by mouth once daily., Disp: , Rfl:     cyanocobalamin, vitamin B-12, 1,000 mcg tablet, sublingual, Place under the tongue. one tab sublingual daily five days per week, monday thru friday, Disp: , Rfl:     diabetic supplies, miscellan. misc, Onetouch ultrasoft lancets misc, Disp: , Rfl:     empagliflozin (Jardiance) 25 mg, TAKE 1 TABLET BY MOUTH EVERY MORNING, Disp: 30 tablet, Rfl: 11    ezetimibe (Zetia) 10 mg tablet, Take 1 tablet (10 mg) by mouth once daily. DAILY, Disp: 90 tablet, Rfl: 3    fluvastatin (Lescol) 40 mg capsule, Take 1 capsule (40 mg) by mouth once daily at bedtime., Disp: 90 capsule, Rfl: 1    FreeStyle Cory sensor system (FreeStyle Cory 2 Sensor) kit, Use as instructed, Disp: 1 each, Rfl: 0    hydroCHLOROthiazide (HYDRODiuril) 25 mg tablet, Take 0.5 tablets (12.5 mg) by mouth once daily. DAILY, Disp: 90 tablet, Rfl: 3    insulin glargine (Lantus Solostar U-100 Insulin) 100 unit/mL (3 mL) pen, Inject 35 Units under the skin once daily in the morning. Take as directed per insulin instructions., Disp: 31.5 mL, Rfl: 3    insulin lispro (HumaLOG U-100 Insulin) 100 unit/mL injection, Inject 0.1 mL (10 Units) under the skin 3 times a day with meals. Take as directed per insulin instructions., Disp: 10 mL, Rfl: 9    lancets (MICROLET LANCET MISC), Test sugar 6 times a day, Disp: , Rfl:     loratadine (Claritin Reditabs) 10 mg disintegrating tablet, Take 1 tablet (10 mg) by mouth once daily., Disp: , Rfl:     MAGNESIUM CITRATE ORAL, Take 1 tablet by mouth at bedtime. MAGNESIUM CITRATE 200 MG, Disp: , Rfl:     meclizine (Antivert) 12.5 mg tablet, Take 1 tablet (12.5 mg) by mouth once every 24 hours., Disp: , Rfl:     omega-3/dha/epa/fish oil (OMEGA-3 ORAL), Take 1 capsule by mouth in the morning and at bedtime. 1,000 MG 2 TIMES A DAY, Disp: , Rfl:     psyllium (Metamucil) 3.4 gram packet, Take by mouth at bedtime. Dissolve 1 tsp in 8 oz of water and drink daily at bedtime and not near  "other medication, forconstipation, Disp: , Rfl:     semaglutide 2 mg/dose (8 mg/3 mL) pen injector, Inject 2 mg under the skin 1 (one) time per week., Disp: 3 mL, Rfl: 5    /71 (BP Location: Left arm, Patient Position: Sitting)   Pulse 75   Ht 1.499 m (4' 11\")   Wt 65.8 kg (145 lb)   SpO2 97%   BMI 29.29 kg/m²     PHYSICAL EXAM:  GENERAL: NAD  HEENT: no JVD  CV: RRR without m/r/g  PULM: CTAB  EXT: non-edematous bilateral lower extremities     LABS  WBC (x10*3/uL)   Date Value   05/23/2024 7.7     Hemoglobin (g/dL)   Date Value   05/23/2024 16.1 (H)     Platelets (x10*3/uL)   Date Value   05/23/2024 219     Sodium (mmol/L)   Date Value   05/23/2024 138     Potassium (mmol/L)   Date Value   05/23/2024 3.8     Chloride (mmol/L)   Date Value   05/23/2024 99     Bicarbonate (mmol/L)   Date Value   05/23/2024 29     Urea Nitrogen (mg/dL)   Date Value   05/23/2024 18     Creatinine (mg/dL)   Date Value   05/23/2024 1.42 (H)     Calcium (mg/dL)   Date Value   05/23/2024 9.8     Total Protein (g/dL)   Date Value   05/23/2024 6.9   05/23/2024 6.9     Bilirubin, Total (mg/dL)   Date Value   05/23/2024 1.1     Alkaline Phosphatase (U/L)   Date Value   05/23/2024 75     ALT (U/L)   Date Value   05/23/2024 31     AST (U/L)   Date Value   05/23/2024 25     Glucose (mg/dL)   Date Value   05/23/2024 108 (H)     Cholesterol (mg/dL)   Date Value   12/16/2023 185   09/16/2023 150   05/19/2023 132   04/27/2022 129     LDL Calculated (mg/dL)   Date Value   12/16/2023 102 (H)     HDL-Cholesterol (mg/dL)   Date Value   12/16/2023 49.3     HDL (mg/dL)   Date Value   09/16/2023 42.5   05/19/2023 37.9 (A)   04/27/2022 41.8     Triglycerides (mg/dL)   Date Value   12/16/2023 171 (H)   09/16/2023 143   05/19/2023 184 (H)   04/27/2022 160 (H)     POC HEMOGLOBIN A1c (%)   Date Value   06/07/2023 8.4 (A)   03/07/2023 8.2 (A)     Hemoglobin A1C (%)   Date Value   03/18/2024 7.7 (H)   12/16/2023 6.6 (H)   09/16/2023 8.5 (A)   11/15/2021 " 7.4 (A)     Lab Results   Component Value Date    LDLF 79 09/16/2023     ASSESSMENT/PLAN:  77 yo F with hx of DM, CKD, HTN, DLD, and L renal artery stent for renal artery stenosis (2022) here for [facility] follow-up. HgbA1c up to 8.5% likely from dietary indiscretion due to personal stressors. , , Cr 1.4, LFTs fine. Work on diet/lifestyle changes given recent indiscretion and continue same medications: ozempic 2 mg weekly, empagliflozin 25 mg daily, 35 units lantus daily, and meal-time insulin 10 units with meals. In the future, would like to get off meal-time insulin and uptitrate lantus. Has CGM. BP controlled on amlodipine 10 mg daily, coreg 25 mg BID, and hydrochlorothiazide 12.5 mg daily. Noted not on ACE-I. LDL previously in the 70s and now up to 102 on ezetimibe 10 mg daily, omega 3, and fluvasatin 40 mg daily. Will continue to watch as not at goal for secondary prevention. If LDL remains elevated in the future, consider switching statin or [id number]. On ASA 81 mg daily given prior renal stent. Follows with Endovascular regarding elevated velocities in L renal artery with plan for repeat US in 6 months. RTC November.    ADDENDUM (6/27/2024): Patient has CGM and uses 100% of the time; greatly benefits from it and blood sugar control much better with its use.

## 2024-06-11 ENCOUNTER — OFFICE VISIT (OUTPATIENT)
Dept: HEMATOLOGY/ONCOLOGY | Facility: CLINIC | Age: 79
End: 2024-06-11
Payer: MEDICARE

## 2024-06-11 VITALS
SYSTOLIC BLOOD PRESSURE: 148 MMHG | DIASTOLIC BLOOD PRESSURE: 72 MMHG | HEART RATE: 66 BPM | WEIGHT: 148.15 LBS | TEMPERATURE: 98.4 F | OXYGEN SATURATION: 95 % | BODY MASS INDEX: 29.92 KG/M2

## 2024-06-11 DIAGNOSIS — D89.89 KAPPA LIGHT CHAIN DISEASE (MULTI): ICD-10-CM

## 2024-06-11 DIAGNOSIS — D47.2 MGUS (MONOCLONAL GAMMOPATHY OF UNKNOWN SIGNIFICANCE): ICD-10-CM

## 2024-06-11 PROCEDURE — 99214 OFFICE O/P EST MOD 30 MIN: CPT | Performed by: INTERNAL MEDICINE

## 2024-06-11 PROCEDURE — 3078F DIAST BP <80 MM HG: CPT | Performed by: INTERNAL MEDICINE

## 2024-06-11 PROCEDURE — G2211 COMPLEX E/M VISIT ADD ON: HCPCS | Performed by: INTERNAL MEDICINE

## 2024-06-11 PROCEDURE — 1126F AMNT PAIN NOTED NONE PRSNT: CPT | Performed by: INTERNAL MEDICINE

## 2024-06-11 PROCEDURE — 3077F SYST BP >= 140 MM HG: CPT | Performed by: INTERNAL MEDICINE

## 2024-06-11 ASSESSMENT — PATIENT HEALTH QUESTIONNAIRE - PHQ9
1. LITTLE INTEREST OR PLEASURE IN DOING THINGS: NOT AT ALL
2. FEELING DOWN, DEPRESSED OR HOPELESS: NOT AT ALL
SUM OF ALL RESPONSES TO PHQ9 QUESTIONS 1 AND 2: 0

## 2024-06-11 ASSESSMENT — ENCOUNTER SYMPTOMS
OCCASIONAL FEELINGS OF UNSTEADINESS: 0
LOSS OF SENSATION IN FEET: 0
DEPRESSION: 0

## 2024-06-11 ASSESSMENT — PAIN SCALES - GENERAL: PAINLEVEL: 0-NO PAIN

## 2024-06-11 NOTE — PROGRESS NOTES
Patient ID: Eleonora Varela is a 78 y.o. female.  Referring Physician: Eyad Yi MD  [address] [facility] [facility] [address]  [place] [address]  Primary Care Provider: Jennifer Kenney MD    ORDERS & PATIENT INSTRUCTIONS:        RTC 12 m  CBC, CMP,S free light chains            ASSESSMENT, PROBLEM LIST, DECISION MAKING, PLAN.       1.MGUS with  Elevated serum kappa light chain but negative serum protein electrophoresis, negative 24-hour urine immunoelectrophoresis. Bone marrow aspirate and biopsy was negative other than mildly decreased M:E ratio  1.8:1, plasma cell 1%, normal flow cytometry and cytogenetics at [facility] in February 2017, negative for Amylodosis   2. Proteinuria most likely related to diabetic nephropathy.   3. Diabetes mellitus,   4. Diabetic retinopathy.   5. Hypertension.   6. History of carpal tunnel surgery.   Patient had a renal artery stent placed on May 9, 2022 at [facility]     INTERVAL HISTORY:   Patient returns today for follow-up on MGUS  Patient has been doing well denies any headache fever cough chest pain shortness of breath urinary symptoms, denies any new complaint.   denies any headache fever cough chest pain shortness of breath urinary symptoms        PHYSICAL EXAMINATION:    General: Conscious, alert, oriented x3, not in acute distress.  HEENT:    No icterus.    Chest:Bilateral symmetrical,     CVS:  S1, S2.    Abdomen:  Soft, no palpable mass   Extremities: No clubbing, cyanosis,     Skin: No petechial rash.    ASSESSMENT/PLAN:   MGUS, with mildly elevated serum free light chains   - She has 1% chance per year of progressing, does not require any further workup from hematology oncology standpoint and we will recommend watchful waiting and periodic lab works.  Her serum free light chain is reasonably stable, continue watchful waiting  Proteinuria, negative for Bence-Gomez proteinuria, most likely related to diabetic nephropathy  Chronic  renal insufficiency,    Patient had a renal artery stent placed on May 9, 2022 at [facility]     Her serum free light chain is overall stable, no evidence of progression of MGUS    Continue watchful waiting and will return for follow-up in 6 months    Patient has a diabetic retinopathy for which she is receiving injections in the Lt eye    Diabetes mellitus and hypercalcemia, has been followed by Dr. Pavon under Cinema program and now on Jardiance and Ozempic         Return for follow-up in 12 months          VITALS:   1.67 meters squared /72   Pulse 66   Temp 36.9 °C (98.4 °F) (Temporal)   Wt 67.2 kg (148 lb 2.4 oz)   SpO2 95%   BMI 29.92 kg/m²     LABS:    CBC:  Recent Labs     05/23/24  1045 12/04/23  0717 03/27/23  0820 09/16/22  0944 06/23/22  0913   WBC 7.7 6.7 6.6 5.9 5.3   HGB 16.1* 16.2* 14.4 14.4 14.8   HCT 48.8* 48.1* 43.3 42.2 43.6    208 180 196 185   MCV 89 88 90 89 89       CMP:  Recent Labs     05/23/24  1045 01/12/24  1153 12/16/23  0805 12/04/23  0717 10/14/23  0806 05/12/21  0702 02/17/21  0829    141 140 140 139   < > 137   K 3.8 4.1 4.3 3.9 3.9   < > 4.0   CL 99 99 99 101 98   < > 101   CO2 29 31 30 33* 30   < > 29   ANIONGAP 14 15 15 10 15   < > 11   BUN 18 27* 29* 28* 22   < > 28*   CREATININE 1.42* 1.50* 1.59* 1.49* 1.54*   < > 1.46*   EGFR 38* 36* 33* 36* 35*  --   --    MG  --   --  2.67*  --  2.61*  --  2.08    < > = values in this interval not displayed.     Recent Labs     05/23/24  1045 01/12/24  1153 12/16/23  0805 12/04/23  0717 09/16/23  0900 05/19/23  0705   ALBUMIN 4.4 4.8 4.5 4.2 4.4 4.2   ALKPHOS 75  --  70 73 97 114   ALT 31  --  18 21 27 29   AST 25  --  18 22 22 24   BILITOT 1.1  --  1.2 1.0 1.0 0.9       HEME/ENDO:  Recent Labs     05/19/23  0705 04/27/22  0701 05/12/21  0702 07/25/20  0806 01/09/20  0755 09/13/19  0727   TSH 1.48 2.38 2.15 1.49  --  1.66   [id number] 770 >2000* 776  --   --  524   FOLATE  --   --   --   --  11.6  --      "    MICRO: No results for input(s): \"ESR\", \"CRP\", \"PROCAL\" in the last 20373 hours.  No results found for the last 90 days.        TUMOR MARKERS:  No results found for: \"LABCA2\", \"CEA\", \"\", \"PSA\", \"AFPTM\", \"HCGTM\", \"\"             IMAGING:         Vascular US renal artery duplex complete                   [facility]  [address], [address], [place] [address]        Tel [phone] and Fax [phone]       Vascular Lab Report  [facility] US RENAL ARTERY DUPLEX COMPLETE       Patient Name:      ALYSON Echeverria Physician: [id number] Aidan Buenrostro MD  Study Date:        6/5/2024           Ordering           [id number] HAYES POWELL                                        Physician:  MRN/PID:           [id number]           Technologist:      Sirena Porras RVT,                                                           [facility]  Accession#:        [id number]       Technologist 2:  Date of Birth/Age: 1945 / 78      Encounter#:        [id number]                     years  Gender:            F  Admission Status:  Outpatient         Location           [facility]                                        Performed:       Diagnosis/ICD: Atherosclerosis of renal artery-I70.1  CPT Codes:     17078 Abdominal Visceral Renal       CONCLUSIONS:  Right Renal Artery: Right renal arteries demonstrate no evidence of hemodynamically significant stenosis.  Left Renal Artery: Left renal arteries demonstrate no evidence of hemodynamically significant stenosis. Stent not well visualized, however appears patent.  Stent: Renal Artery - the stent located in the renal artery origin left is patent without stenosis. Renal Artery - the stent located in the renal artery proximal left is patent without stenosis.     Imaging & Doppler Findings:     AORTA    AP  Distal 1.05 cm       Renal Artery Duplex Right Kidney: 9.5 cm                           Left Kidney: 9.9 cm        Systolic       Diastolic     ARTERY           " Systolic       Diastolic        64 cm/s         20 cm/s      Origin            66 cm/s        22 cm/s        59 cm/s         19 cm/s       Prox             48 cm/s        16 cm/s        74 cm/s         12 cm/s        Mid             59 cm/s        8 cm/s        36 cm/s         10 cm/s      Distal            54 cm/s        17 cm/s        22 cm/s         9 cm/s      Superior           22 cm/s        8 cm/s        16 cm/s         4 cm/s      Inferior           16 cm/s        6 cm/s                         Right                          Left                          1.5       R/A Ratio            1.3                          0.6    Resistive Index         0.6       Ao Dist Diam 1.10 cm  Mid Ao       51 cm/s          [id number] Aidan Buenrostro MD  Electronically signed by [id number] Aidan Buenrostro MD on 6/6/2024 at 10:04:06 AM       ** Final **       Current Outpatient Medications   Medication Sig Dispense Refill    amLODIPine (Norvasc) 10 mg tablet Take 1 tablet (10 mg) by mouth once daily. 90 tablet 3    aspirin 81 mg EC tablet Take 1 tablet (81 mg) by mouth once daily.      blood sugar diagnostic strip 1 strip 3 times a day. ONE TOUCH ULTRA 2 TEST STRIPS 270 strip 3    carvedilol (Coreg) 25 mg tablet Take 1 tablet (25 mg) by mouth 2 times a day with meals. 180 tablet 1    cholecalciferol (Vitamin D-3) 25 MCG (1000 UT) tablet Take 1 tablet (25 mcg) by mouth once daily.      cyanocobalamin, vitamin B-12, 1,000 mcg tablet, sublingual Place under the tongue. one tab sublingual daily five days per week, monday thru friday      diabetic supplies, miscellan. misc Onetouch ultrasoft lancets misc      empagliflozin (Jardiance) 25 mg TAKE 1 TABLET BY MOUTH EVERY MORNING 30 tablet 11    ezetimibe (Zetia) 10 mg tablet Take 1 tablet (10 mg) by mouth once daily. DAILY 90 tablet 3    fluvastatin (Lescol) 40 mg capsule Take 1 capsule (40 mg) by mouth once daily at bedtime. 90 capsule 1    FreeStyle Cory sensor system (FreeStyle Cory 2 Sensor) kit  Use as instructed 1 each 0    hydroCHLOROthiazide (HYDRODiuril) 25 mg tablet Take 0.5 tablets (12.5 mg) by mouth once daily. DAILY 90 tablet 3    insulin glargine (Lantus Solostar U-100 Insulin) 100 unit/mL (3 mL) pen Inject 35 Units under the skin once daily in the morning. Take as directed per insulin instructions. 31.5 mL 3    insulin lispro (HumaLOG U-100 Insulin) 100 unit/mL injection Inject 0.1 mL (10 Units) under the skin 3 times a day with meals. Take as directed per insulin instructions. 10 mL 9    lancets (MICROLET LANCET MISC) Test sugar 6 times a day      loratadine (Claritin Reditabs) 10 mg disintegrating tablet Take 1 tablet (10 mg) by mouth once daily.      MAGNESIUM CITRATE ORAL Take 1 tablet by mouth at bedtime. MAGNESIUM CITRATE 200 MG      meclizine (Antivert) 12.5 mg tablet Take 1 tablet (12.5 mg) by mouth once every 24 hours.      omega-3/dha/epa/fish oil (OMEGA-3 ORAL) Take 1 capsule by mouth in the morning and at bedtime. 1,000 MG 2 TIMES A DAY      psyllium (Metamucil) 3.4 gram packet Take by mouth at bedtime. Dissolve 1 tsp in 8 oz of water and drink daily at bedtime and not near other medication, forconstipation      semaglutide 2 mg/dose (8 mg/3 mL) pen injector Inject 2 mg under the skin 1 (one) time per week. 3 mL 5     No current facility-administered medications for this visit.            FAMILY HISTORY:   Family History   Problem Relation Name Age of Onset    Prostate cancer Father      Diabetes type II Father      Breast cancer Sister          2 sisters now with breast cancer both post menopausal.    Diabetes type II Sister      Breast cancer Sister  62    Other (acute myocardial infraction) Brother  59        2021    Other (herion addiction) Daughter      Other (alcohol dependence) Daughter      Drug abuse Daughter      No Known Problems Father's Brother      Other (Large b cell lymphoma of lymph nodes on neck) Maternal Grandfather      Other (diffuse large b cell lymphoma of lymph  nodes of neck) Half-Sister          ALLERGY: Propoxyphene n-acetaminophen, Adhesive tape-silicones, Cefaclor, and Latex    SOCIAL HISTORY: She  reports that she does not currently use alcohol. She  reports no history of drug use.        ALLERGIES: Allergic to LATEX, DARVOCET, CECLOR, AND NEOSPORIN.   FAMILY HISTORY: Father had pancreatic cancer, sister had breast cancer, other sister had lymphoma, brother had testicular cancer, and mother  in childbirth.   SOCIAL HISTORY: Never smoked. Denies any alcohol or illicit drug use. (1)              Medication reviewed in e-chart  Patient is monitored for medication toxicity  labs reviewed and interpreted independently, X rays independently reviewed  Notes from other physicians involved in care were reviewed    Charting was completed using voice recognition technology and may include unintended errors.    CAESAR BROWN MD, ANGELA.    Obed Boyd Master Clinician in Hematology and Oncology  Medical Director, [facility] at [facility].  [place] office  Phone [phone]  Fax      [phone]  [facility] /[place].  Phone [phone]  Fax     [phone]

## 2024-06-11 NOTE — PROGRESS NOTES
Follow-up in 12 months with labs prior. Call back instructions reviewed.  Patient verbalized understanding.

## 2024-06-14 ENCOUNTER — APPOINTMENT (OUTPATIENT)
Dept: PHARMACY | Facility: HOSPITAL | Age: 79
End: 2024-06-14
Payer: MEDICARE

## 2024-06-14 DIAGNOSIS — Z79.4 TYPE 2 DIABETES MELLITUS WITH INSULIN THERAPY (MULTI): Primary | ICD-10-CM

## 2024-06-14 DIAGNOSIS — E11.9 TYPE 2 DIABETES MELLITUS WITH INSULIN THERAPY (MULTI): Primary | ICD-10-CM

## 2024-06-14 PROCEDURE — RXMED WILLOW AMBULATORY MEDICATION CHARGE

## 2024-06-14 NOTE — PROGRESS NOTES
Pharmacy [facility]   Follow Up  Patient is sent at the request of Alma Pavon MD for medication management.  My final recommendations will be communicated back to the requesting provider by way of shared medical record.     Subjective   Diabetes  She presents for her follow-up diabetic visit. She has type 2 diabetes mellitus. Her disease course has been improving. There are no hypoglycemic associated symptoms. There are no diabetic associated symptoms. There are no hypoglycemic complications. Symptoms are stable. Risk factors for coronary artery disease include diabetes mellitus, dyslipidemia, hypertension and obesity. She is compliant with treatment all of the time. Her overall blood glucose range is 140-180 mg/dl. An ACE inhibitor/angiotensin II receptor blocker is not being taken. Eye exam is current (Patient just saw her optometrist for her reitonpathy and she notes they are pleased with her progress.).      Cardiovascular risk factors include advanced age (older than 55 for men, 65 for women), diabetes mellitus, dyslipidemia, hypertension, and obesity (BMI >= 30 kg/m2). The patient is not on an ACE inhibitor or angiotensin II receptor blocker.  The patient has not been previously hospitalized due to diabetic ketoacidosis.      At last visit, determined to continue Ozempic 2 mg/week (maximum dosing). At today's visit, will evaluate patient's BG values more extensively. Ideally, would like to move towards discontinuing patient's mealtime insulin if possible.     Pharmacotherapy   Lantus solostar 35 units subcutaneous once daily AM  Humalog KwikPen 10 units TID AC   Ozempic 2 mg inject 2 mg under the skin once a week on  -- Doses remainin  Jardiance 25 mg once daily    Previous Pharmacotherapy    Humulin N insulin   Metformin - d/c'ed by Dr. Shelley in       Current monitoring regimen:  Patient is using continuous glucose monitor; FreeStyle Cory 2; not connected to smartphone       Reported SMBG Measurements  - This morning >150 mg/dL     6/14/24 -- 14 day average: 214 mg/dL  Time Range BG (mg/dL)   6a -12p 212   12p - 6p 212   6p -12a 230   12a - 6am 205   TIR 36%  Above 64%  Below 0%      5/17/24 -- 14 day average: 184 mg/dL  Time Range BG (mg/dL)   6a -12p 199   12p - 6p 161   6p -12a 184         Previous BG Measurements  3/15/24 -- 14 day average: 204 mg/dL  Time Range BG (mg/dL)   6a -12p 192   12p - 6p 197   6p -12a 233   TIR: 37%  High: 63%  Low: 0%     2/23/24 -- 14 day average: 174 mg/dL  Time Range BG (mg/dL)   6a -12p 169   12p - 6p 159   6p -12a 198   TIR: 52%  High (181-249): 48%  Low (54-69): 0%        2/2/24 -- 14 day average: 210 mg/dL  Time Range BG (mg/dL)   6a -12p 229   12p - 6p 208   6p -12a 206         1/19/24 -- 14 day average: 187 mg/dL  Time Range BG (mg/dL)   6a -12p 191   12p - 6p 171   6p -12a 205   TIR: 58%; High (181-249): 42%        Any episodes of hypoglycemia? Patient notes that since the passing of her daughter, she noticed a few low blood sugar readings 2 hours after breakfast.      Physical Activity: Goes to a virtual exercise class 3 times a week and will go back to swimming 2 times a week next week     Adverse Effects:   Patient denies any GI adverse effects (Upset stomach/diarrhea/constipation/nausea/vomiting)  Patient denies any urinary side effects from the Jardiance      Reported Weight:  Baseline: 174 lbs   Current: 150 lbs      Adherence: no issues reported   Affordability/Accessibility: Patient has met her deductible so now her Ozempic is $30/month.    Laboratory Results  Lab Results   Component Value Date    BILITOT 1.1 05/23/2024    CALCIUM 9.8 05/23/2024    CO2 29 05/23/2024    CL 99 05/23/2024    CREATININE 1.42 (H) 05/23/2024    GLUCOSE 108 (H) 05/23/2024    ALKPHOS 75 05/23/2024    K 3.8 05/23/2024    PROT 6.9 05/23/2024    PROT 6.9 05/23/2024     05/23/2024    AST 25 05/23/2024    ALT 31 05/23/2024    BUN 18 05/23/2024    ANIONGAP 14  05/23/2024    MG 2.67 (H) 12/16/2023    PHOS 3.8 05/23/2024    ALBUMIN 4.4 05/23/2024    GFRF 37 (A) 09/16/2023    EGFR 38 (L) 05/23/2024     Lab Results   Component Value Date    TRIG 171 (H) 12/16/2023    CHOL 185 12/16/2023    HDL 49.3 12/16/2023     Lab Results   Component Value Date    HGBA1C 7.7 (H) 03/18/2024       Assessment/Plan   Problem List Items Addressed This Visit       Type 2 diabetes mellitus with insulin therapy (Multi) - Primary    Relevant Orders    Follow Up In Clinical Pharmacy     General Patient Discussion  Patient saw Dr. Alma Pavon this past Monday. Per patient, Dr. Pavon wishes to continue Lantus 35 units daily and Humalog TID for now- therefore, will not make any medication dose adjustments at this time.   Infrequent hypoglycemia (~once a month after swimming). If BG <=150 pre-swimming, patient will take a glucose tablet before swimming to prevent hypoglycemia.   Patient feels mealtime insulin is adequate if she is 'doing well' with her diet. Discussed possibility of carbohydrate tracking/counting, patient feels this is too difficult for her.   As patient utilizes [employer] system with  device (no phone integration), difficult to determine true efficacy of patient's insulin regimen throughout the day- discussed with patient keeping a log of BG values over this next month to assist in recording pre- and post- mealtime values, in addition to AM FPG and bedtime values, if possible.     Plan/Changes   Continue all meds under the continuation of care with the referring provider and clinical pharmacy team  Continue Lantus 35 units once daily  Continue Humalog 10 units TID    Upcoming Appointments  Clinical Pharmacy: 1 month, 7/12/24 11:30 am  [facility]: 11/18/24  PCP: 12/9/24      Avani Pedroza PharmD     Verbal consent to manage patient's drug therapy was obtained from the patient. They were informed they may decline to participate or withdraw from participation in  pharmacy services at any time.

## 2024-06-18 ENCOUNTER — PHARMACY VISIT (OUTPATIENT)
Dept: PHARMACY | Facility: CLINIC | Age: 79
End: 2024-06-18
Payer: MEDICARE

## 2024-06-18 ENCOUNTER — APPOINTMENT (OUTPATIENT)
Dept: PRIMARY CARE | Facility: CLINIC | Age: 79
End: 2024-06-18
Payer: MEDICARE

## 2024-06-18 VITALS
WEIGHT: 147 LBS | DIASTOLIC BLOOD PRESSURE: 52 MMHG | HEIGHT: 60 IN | RESPIRATION RATE: 18 BRPM | SYSTOLIC BLOOD PRESSURE: 112 MMHG | HEART RATE: 72 BPM | BODY MASS INDEX: 28.86 KG/M2

## 2024-06-18 DIAGNOSIS — Z78.9 HEPATITIS C ANTIBODY TEST NEGATIVE: ICD-10-CM

## 2024-06-18 DIAGNOSIS — R93.1 AGATSTON CAC SCORE, >400: ICD-10-CM

## 2024-06-18 DIAGNOSIS — N18.32 STAGE 3B CHRONIC KIDNEY DISEASE (MULTI): ICD-10-CM

## 2024-06-18 DIAGNOSIS — E53.8 B12 NUTRITIONAL DEFICIENCY: ICD-10-CM

## 2024-06-18 DIAGNOSIS — Z78.9 FULL CODE STATUS: ICD-10-CM

## 2024-06-18 DIAGNOSIS — I25.10 CORONARY ARTERY DISEASE INVOLVING NATIVE CORONARY ARTERY OF NATIVE HEART WITHOUT ANGINA PECTORIS: ICD-10-CM

## 2024-06-18 DIAGNOSIS — R01.1 SYSTOLIC MURMUR: ICD-10-CM

## 2024-06-18 DIAGNOSIS — Z98.890 HISTORY OF STENT INSERTION OF RENAL ARTERY: ICD-10-CM

## 2024-06-18 DIAGNOSIS — E55.9 VITAMIN D DEFICIENCY: ICD-10-CM

## 2024-06-18 DIAGNOSIS — E78.5 HYPERLIPIDEMIA ASSOCIATED WITH TYPE 2 DIABETES MELLITUS (MULTI): ICD-10-CM

## 2024-06-18 DIAGNOSIS — E11.9 TYPE 2 DIABETES MELLITUS WITH INSULIN THERAPY (MULTI): ICD-10-CM

## 2024-06-18 DIAGNOSIS — M85.852 OSTEOPENIA OF NECK OF LEFT FEMUR: ICD-10-CM

## 2024-06-18 DIAGNOSIS — Z79.4 TYPE 2 DIABETES MELLITUS WITH INSULIN THERAPY (MULTI): ICD-10-CM

## 2024-06-18 DIAGNOSIS — Z79.899 ENCOUNTER FOR MONITORING STATIN THERAPY: ICD-10-CM

## 2024-06-18 DIAGNOSIS — Z00.00 ENCOUNTER FOR SUBSEQUENT ANNUAL WELLNESS VISIT (AWV) IN MEDICARE PATIENT: Primary | ICD-10-CM

## 2024-06-18 DIAGNOSIS — E11.65 TYPE 2 DIABETES MELLITUS WITH HYPERGLYCEMIA, WITH LONG-TERM CURRENT USE OF INSULIN (MULTI): ICD-10-CM

## 2024-06-18 DIAGNOSIS — Z79.4 TYPE 2 DIABETES MELLITUS WITH HYPERGLYCEMIA, WITH LONG-TERM CURRENT USE OF INSULIN (MULTI): ICD-10-CM

## 2024-06-18 DIAGNOSIS — Z51.81 ENCOUNTER FOR MONITORING STATIN THERAPY: ICD-10-CM

## 2024-06-18 DIAGNOSIS — Z00.01 ANNUAL VISIT FOR GENERAL ADULT MEDICAL EXAMINATION WITH ABNORMAL FINDINGS: ICD-10-CM

## 2024-06-18 DIAGNOSIS — Z12.31 VISIT FOR SCREENING MAMMOGRAM: ICD-10-CM

## 2024-06-18 DIAGNOSIS — D47.2 MGUS (MONOCLONAL GAMMOPATHY OF UNKNOWN SIGNIFICANCE): ICD-10-CM

## 2024-06-18 DIAGNOSIS — E11.69 HYPERLIPIDEMIA ASSOCIATED WITH TYPE 2 DIABETES MELLITUS (MULTI): ICD-10-CM

## 2024-06-18 LAB — POC HEMOGLOBIN A1C: 8.5 % (ref 4.2–6.5)

## 2024-06-18 PROCEDURE — G0439 PPPS, SUBSEQ VISIT: HCPCS | Performed by: INTERNAL MEDICINE

## 2024-06-18 PROCEDURE — 3074F SYST BP LT 130 MM HG: CPT | Performed by: INTERNAL MEDICINE

## 2024-06-18 PROCEDURE — 99397 PER PM REEVAL EST PAT 65+ YR: CPT | Performed by: INTERNAL MEDICINE

## 2024-06-18 PROCEDURE — 1170F FXNL STATUS ASSESSED: CPT | Performed by: INTERNAL MEDICINE

## 2024-06-18 PROCEDURE — 1159F MED LIST DOCD IN RCRD: CPT | Performed by: INTERNAL MEDICINE

## 2024-06-18 PROCEDURE — 1157F ADVNC CARE PLAN IN RCRD: CPT | Performed by: INTERNAL MEDICINE

## 2024-06-18 PROCEDURE — 1160F RVW MEDS BY RX/DR IN RCRD: CPT | Performed by: INTERNAL MEDICINE

## 2024-06-18 PROCEDURE — 1123F ACP DISCUSS/DSCN MKR DOCD: CPT | Performed by: INTERNAL MEDICINE

## 2024-06-18 PROCEDURE — 1036F TOBACCO NON-USER: CPT | Performed by: INTERNAL MEDICINE

## 2024-06-18 PROCEDURE — 1158F ADVNC CARE PLAN TLK DOCD: CPT | Performed by: INTERNAL MEDICINE

## 2024-06-18 PROCEDURE — 3078F DIAST BP <80 MM HG: CPT | Performed by: INTERNAL MEDICINE

## 2024-06-18 PROCEDURE — 83036 HEMOGLOBIN GLYCOSYLATED A1C: CPT | Performed by: INTERNAL MEDICINE

## 2024-06-18 PROCEDURE — 99213 OFFICE O/P EST LOW 20 MIN: CPT | Performed by: INTERNAL MEDICINE

## 2024-06-18 RX ORDER — CRANBERRY FRUIT 450 MG
1 TABLET ORAL 2 TIMES DAILY
COMMUNITY

## 2024-06-18 SDOH — ECONOMIC STABILITY: INCOME INSECURITY: IN THE LAST 12 MONTHS, WAS THERE A TIME WHEN YOU WERE NOT ABLE TO PAY THE MORTGAGE OR RENT ON TIME?: NO

## 2024-06-18 SDOH — ECONOMIC STABILITY: TRANSPORTATION INSECURITY
IN THE PAST 12 MONTHS, HAS LACK OF TRANSPORTATION KEPT YOU FROM MEETINGS, WORK, OR FROM GETTING THINGS NEEDED FOR DAILY LIVING?: NO

## 2024-06-18 SDOH — ECONOMIC STABILITY: HOUSING INSECURITY
IN THE LAST 12 MONTHS, WAS THERE A TIME WHEN YOU DID NOT HAVE A STEADY PLACE TO SLEEP OR SLEPT IN A SHELTER (INCLUDING NOW)?: NO

## 2024-06-18 SDOH — ECONOMIC STABILITY: FOOD INSECURITY: WITHIN THE PAST 12 MONTHS, THE FOOD YOU BOUGHT JUST DIDN'T LAST AND YOU DIDN'T HAVE MONEY TO GET MORE.: NEVER TRUE

## 2024-06-18 SDOH — ECONOMIC STABILITY: FOOD INSECURITY: WITHIN THE PAST 12 MONTHS, YOU WORRIED THAT YOUR FOOD WOULD RUN OUT BEFORE YOU GOT MONEY TO BUY MORE.: NEVER TRUE

## 2024-06-18 SDOH — ECONOMIC STABILITY: TRANSPORTATION INSECURITY
IN THE PAST 12 MONTHS, HAS THE LACK OF TRANSPORTATION KEPT YOU FROM MEDICAL APPOINTMENTS OR FROM GETTING MEDICATIONS?: NO

## 2024-06-18 SDOH — HEALTH STABILITY: PHYSICAL HEALTH: ON AVERAGE, HOW MANY MINUTES DO YOU ENGAGE IN EXERCISE AT THIS LEVEL?: 50 MIN

## 2024-06-18 SDOH — HEALTH STABILITY: PHYSICAL HEALTH: ON AVERAGE, HOW MANY DAYS PER WEEK DO YOU ENGAGE IN MODERATE TO STRENUOUS EXERCISE (LIKE A BRISK WALK)?: 5 DAYS

## 2024-06-18 SDOH — ECONOMIC STABILITY: HOUSING INSECURITY: IN THE LAST 12 MONTHS, HOW MANY PLACES HAVE YOU LIVED?: 1

## 2024-06-18 ASSESSMENT — SOCIAL DETERMINANTS OF HEALTH (SDOH)
WITHIN THE LAST YEAR, HAVE YOU BEEN KICKED, HIT, SLAPPED, OR OTHERWISE PHYSICALLY HURT BY YOUR PARTNER OR EX-PARTNER?: NO
WITHIN THE LAST YEAR, HAVE YOU BEEN AFRAID OF YOUR PARTNER OR EX-PARTNER?: NO
HOW OFTEN DO YOU ATTEND CHURCH OR RELIGIOUS SERVICES?: MORE THAN 4 TIMES PER YEAR
HOW OFTEN DO YOU ATTENT MEETINGS OF THE CLUB OR ORGANIZATION YOU BELONG TO?: MORE THAN 4 TIMES PER YEAR
IN A TYPICAL WEEK, HOW MANY TIMES DO YOU TALK ON THE PHONE WITH FAMILY, FRIENDS, OR NEIGHBORS?: MORE THAN THREE TIMES A WEEK
HOW OFTEN DO YOU GET TOGETHER WITH FRIENDS OR RELATIVES?: MORE THAN THREE TIMES A WEEK
WITHIN THE LAST YEAR, HAVE YOU BEEN HUMILIATED OR EMOTIONALLY ABUSED IN OTHER WAYS BY YOUR PARTNER OR EX-PARTNER?: NO
WITHIN THE LAST YEAR, HAVE TO BEEN RAPED OR FORCED TO HAVE ANY KIND OF SEXUAL ACTIVITY BY YOUR PARTNER OR EX-PARTNER?: NO
HOW HARD IS IT FOR YOU TO PAY FOR THE VERY BASICS LIKE FOOD, HOUSING, MEDICAL CARE, AND HEATING?: NOT HARD AT ALL
DO YOU BELONG TO ANY CLUBS OR ORGANIZATIONS SUCH AS CHURCH GROUPS UNIONS, FRATERNAL OR ATHLETIC GROUPS, OR SCHOOL GROUPS?: YES

## 2024-06-18 ASSESSMENT — ACTIVITIES OF DAILY LIVING (ADL)
PATIENT'S MEMORY ADEQUATE TO SAFELY COMPLETE DAILY ACTIVITIES?: YES
JUDGMENT_ADEQUATE_SAFELY_COMPLETE_DAILY_ACTIVITIES: YES
FEEDING YOURSELF: INDEPENDENT
DOING_HOUSEWORK: INDEPENDENT
HEARING - RIGHT EAR: FUNCTIONAL
MANAGING_FINANCES: INDEPENDENT
ADEQUATE_TO_COMPLETE_ADL: YES
WALKS IN HOME: INDEPENDENT
GROCERY_SHOPPING: INDEPENDENT
TAKING_MEDICATION: INDEPENDENT
DRESSING: INDEPENDENT
HEARING - LEFT EAR: FUNCTIONAL
TOILETING: INDEPENDENT
GROOMING: INDEPENDENT
BATHING: INDEPENDENT
DRESSING YOURSELF: INDEPENDENT
BATHING: INDEPENDENT

## 2024-06-18 ASSESSMENT — PATIENT HEALTH QUESTIONNAIRE - PHQ9
1. LITTLE INTEREST OR PLEASURE IN DOING THINGS: NOT AT ALL
SUM OF ALL RESPONSES TO PHQ9 QUESTIONS 1 AND 2: 0
SUM OF ALL RESPONSES TO PHQ9 QUESTIONS 1 AND 2: 0
2. FEELING DOWN, DEPRESSED OR HOPELESS: NOT AT ALL
2. FEELING DOWN, DEPRESSED OR HOPELESS: NOT AT ALL
1. LITTLE INTEREST OR PLEASURE IN DOING THINGS: NOT AT ALL

## 2024-06-18 ASSESSMENT — ANXIETY QUESTIONNAIRES
7. FEELING AFRAID AS IF SOMETHING AWFUL MIGHT HAPPEN: NOT AT ALL
1. FEELING NERVOUS, ANXIOUS, OR ON EDGE: NOT AT ALL
5. BEING SO RESTLESS THAT IT IS HARD TO SIT STILL: NOT AT ALL
3. WORRYING TOO MUCH ABOUT DIFFERENT THINGS: NOT AT ALL
GAD7 TOTAL SCORE: 0
6. BECOMING EASILY ANNOYED OR IRRITABLE: NOT AT ALL
4. TROUBLE RELAXING: NOT AT ALL
2. NOT BEING ABLE TO STOP OR CONTROL WORRYING: NOT AT ALL

## 2024-06-18 ASSESSMENT — LIFESTYLE VARIABLES
HOW MANY STANDARD DRINKS CONTAINING ALCOHOL DO YOU HAVE ON A TYPICAL DAY: PATIENT DOES NOT DRINK
HOW OFTEN DO YOU HAVE SIX OR MORE DRINKS ON ONE OCCASION: NEVER
AUDIT-C TOTAL SCORE: 0
SKIP TO QUESTIONS 9-10: 1
HOW OFTEN DO YOU HAVE A DRINK CONTAINING ALCOHOL: NEVER

## 2024-06-18 NOTE — PROGRESS NOTES
Subjective   Reason for Visit: Eleonora Varela is an 78 y.o. female here for a Medicare Wellness visit.     Past Medical, Surgical, and Family History reviewed and updated in chart.    Reviewed all medications by prescribing practitioner or clinical pharmacist (such as prescriptions, OTCs, herbal therapies and supplements) and documented in the medical record.    HPI    Patient Care Team:  Jennifer Kenney MD as PCP - General  Jennifer Kenney MD as PCP - [employer] Medicare Advantage PCP  Eyad Yi MD as Consulting Physician (Hematology and Oncology)  Ahsan Cook MD PhD as Consulting Physician (Cardiology)  Alma Pavon MD as Consulting Physician (Cardiology)  Cayden Palacios MD as Consulting Physician (Nephrology)  Ernesto Orantes MD as Referring Physician (Retina Ophthalmology)  Remberto Evans Chi, MD as Referring Physician (Ophthalmology)     Review of Systems    Objective   Vitals:  /52 (BP Location: Left arm, Patient Position: Sitting, BP Cuff Size: Adult)   Pulse 72   Resp 18   Ht 1.524 m (5')   Wt 66.7 kg (147 lb)   BMI 28.71 kg/m²       Physical Exam    Assessment/Plan   Problem List Items Addressed This Visit     Agatston CAC score, >400    Overview     1/29/2021 = 797 (LM 0, .3, LCx 0, .7)          B12 nutritional deficiency    Relevant Orders    Vitamin B12    Coronary artery disease    Relevant Orders    Lipid Panel    Aspartate Aminotransferase    Alanine Aminotransferase    Creatine Kinase    Cholesterol, LDL Direct    History of stent insertion of renal artery    MGUS (monoclonal gammopathy of unknown significance)    Osteopenia    Relevant Orders    XR DEXA bone density    Stage 3b chronic kidney disease (Multi)    Overview     Last Assessment & Plan: Formatting of this note might be different from the original. Assessment: acute on chronic PLAN: Trend indices, consider renal consult         Type 2 diabetes mellitus with insulin therapy (Multi)     Relevant Orders    POCT glycosylated hemoglobin (Hb A1C) manually resulted (Completed)    Collection of Capillary Blood Specimen    Vitamin D deficiency   Other Visit Diagnoses     Encounter for subsequent annual wellness visit (AWV) in Medicare patient    -  Primary    Annual visit for general adult medical examination with abnormal findings        Systolic murmur        Hepatitis C antibody test negative        Visit for screening mammogram        Relevant Orders    BI mammo bilateral screening tomosynthesis    Full code status        Encounter for monitoring statin therapy        Relevant Orders    Lipid Panel    Aspartate Aminotransferase    Alanine Aminotransferase    Creatine Kinase    Cholesterol, LDL Direct    Routine general medical examination at health care facility

## 2024-06-18 NOTE — PATIENT INSTRUCTIONS
Talk to a dietician. Look at increasing humalog to 11 units instead of 10 am and possibly supper on days you are not exercising. Compare sugars pre and 2 hours after meals on days not exercising vs days you do exercise.   Walking or similar for at least  15 minutes after a meal can help . I can place a referral to the dietician if need be.    Cut back on candy.    Goal for next A1c is getting back down to 7.5 .    Fasting blood test prior to September visit-just a few days prior:  Instructions for obtaining lab tests:   You do not need a paper requisition when obtaining tests at a  facility. No appointment is needed for lab tests.  For fasting blood tests:  Please do not consume calories for 10-12 hours prior to this blood test. It is ok to drink water, you should be hydrated.  Please do not take vitamins, supplements or thyroid medication before your blood is drawn this day.   Most lab results should be available for your review on the  My Chart portal within 48 hours. Please contact the office if you have not received results within 2 weeks of obtaining the test.    Mammogram November, can do bone density then or before. No vitamin D or calcium for 48 hours prior to bone density.

## 2024-06-18 NOTE — ACP (ADVANCE CARE PLANNING)
Confirming Previous Code Status: Yes    Full code ok cpr  HCPOA Aylin dtr.  2nd: does not have one. Suggest.

## 2024-07-12 ENCOUNTER — APPOINTMENT (OUTPATIENT)
Dept: PHARMACY | Facility: HOSPITAL | Age: 79
End: 2024-07-12
Payer: MEDICARE

## 2024-07-12 DIAGNOSIS — Z79.4 TYPE 2 DIABETES MELLITUS WITH INSULIN THERAPY (MULTI): ICD-10-CM

## 2024-07-12 DIAGNOSIS — E11.9 TYPE 2 DIABETES MELLITUS WITH INSULIN THERAPY (MULTI): ICD-10-CM

## 2024-07-12 PROCEDURE — RXMED WILLOW AMBULATORY MEDICATION CHARGE

## 2024-07-12 NOTE — PROGRESS NOTES
Pharmacy [facility]   Follow Up  Patient is sent at the request of Alma Pavon MD for medication management.  My final recommendations will be communicated back to the requesting provider by way of shared medical record.     Subjective   Diabetes  She presents for her follow-up diabetic visit. She has type 2 diabetes mellitus. Her disease course has been improving. There are no hypoglycemic associated symptoms. There are no diabetic associated symptoms. There are no hypoglycemic complications. Symptoms are stable. Risk factors for coronary artery disease include diabetes mellitus, dyslipidemia, hypertension and obesity. She is compliant with treatment all of the time. Her overall blood glucose range is 140-180 mg/dl. An ACE inhibitor/angiotensin II receptor blocker is not being taken. Eye exam is current (Patient just saw her optometrist for her reitonpathy and she notes they are pleased with her progress.).      Cardiovascular risk factors include advanced age (older than 55 for men, 65 for women), diabetes mellitus, dyslipidemia, hypertension, and obesity (BMI >= 30 kg/m2). The patient is not on an ACE inhibitor or angiotensin II receptor blocker.  The patient has not been previously hospitalized due to diabetic ketoacidosis.      At last visit, continued Lantus 35 units once daily and Humalog 10 units TID at the request of referring [facility] provider Dr. Pavon. In addition, Clinical Pharmacy requested that patient begin keeping a log of pre- and post- meal blood glucose numbers, in addition to AM FPG and bedtime values to better understand patient's daily BG values. Ideally, would like to move towards discontinuing patient's mealtime insulin if possible.     Pharmacotherapy   Lantus solostar 35 units subcutaneous once daily AM  Humalog KwikPen 10 units TID AC   Ozempic 2 mg inject 2 mg under the skin once a week on  -- Doses remainin  Jardiance 25 mg once daily    Previous  Pharmacotherapy    Humulin N insulin   Metformin - d/c'ed by Dr. Shelley in 2020      Current monitoring regimen:  Patient is using continuous glucose monitor; FreeStyle Cory 2; not connected to smartphone      Reported SMBG Measurements    BG Assorted (14 day) Notes   Pre-Breakfast (7-7:30 am) 160s    Post-Breakfast 2 hour 120s-160s    Swimming (11-11:30 am) N/A Drinks apple-juice before swimming if BG lower ~90   Post-Swimming (12 pm) 128, 121    Pre-Lunch (12 pm) 96, 110    Post-Lunch 2 hour 137, 132, 138    3-4 pm notices BG drops unknown May eat popcorn or fruit   Pre-Dinner (5:30-6 pm) 109, 150, 142, 157    Post-Dinner 2 hour 150, 198, 265    Bedtime (10:30 pm) 144, 157, 188      One instance of hypoglycemia on July 7th, 59 mg/dL, mid-afternoon       7/12/24 -- 14 day average: 168 mg/dL  Time Range BG (mg/dL)   6a -12p 162   12p - 6p 148   6p -12a 192   12a - 6am 174   TIR 52%- Range adjusted to  mg/dL by PCP  Above 48%  Below 0%     6/14/24 -- 14 day average: 214 mg/dL  Time Range BG (mg/dL)   6a -12p 212   12p - 6p 212   6p -12a 230   12a - 6am 205   TIR 36%  Above 64%  Below 0%     5/17/24 -- 14 day average: 184 mg/dL  Time Range BG (mg/dL)   6a -12p 199   12p - 6p 161   6p -12a 184         Previous BG Measurements  3/15/24 -- 14 day average: 204 mg/dL  Time Range BG (mg/dL)   6a -12p 192   12p - 6p 197   6p -12a 233   TIR: 37%  High: 63%  Low: 0%     2/23/24 -- 14 day average: 174 mg/dL  Time Range BG (mg/dL)   6a -12p 169   12p - 6p 159   6p -12a 198   TIR: 52%  High (181-249): 48%  Low (54-69): 0%        2/2/24 -- 14 day average: 210 mg/dL  Time Range BG (mg/dL)   6a -12p 229   12p - 6p 208   6p -12a 206         1/19/24 -- 14 day average: 187 mg/dL  Time Range BG (mg/dL)   6a -12p 191   12p - 6p 171   6p -12a 205   TIR: 58%; High (181-249): 42%        Any episodes of hypoglycemia? Patient notes that since the passing of her daughter, she noticed a few low blood sugar readings 2 hours after breakfast.       Physical Activity: Goes to a virtual exercise class 3 times a week and will go back to swimming 2 times a week next week     Adverse Effects:   Patient denies any GI adverse effects (Upset stomach/diarrhea/constipation/nausea/vomiting)  Patient denies any urinary side effects from the Jardiance      Reported Weight:  Baseline: 174 lbs   Current: 147 lbs      Adherence: no issues reported   Affordability/Accessibility: Patient has met her deductible so now her Ozempic is $30/month.    Laboratory Results  Lab Results   Component Value Date    BILITOT 1.1 05/23/2024    CALCIUM 9.8 05/23/2024    CO2 29 05/23/2024    CL 99 05/23/2024    CREATININE 1.42 (H) 05/23/2024    GLUCOSE 108 (H) 05/23/2024    ALKPHOS 75 05/23/2024    K 3.8 05/23/2024    PROT 6.9 05/23/2024    PROT 6.9 05/23/2024     05/23/2024    AST 25 05/23/2024    ALT 31 05/23/2024    BUN 18 05/23/2024    ANIONGAP 14 05/23/2024    MG 2.67 (H) 12/16/2023    PHOS 3.8 05/23/2024    ALBUMIN 4.4 05/23/2024    GFRF 37 (A) 09/16/2023    EGFR 38 (L) 05/23/2024     Lab Results   Component Value Date    TRIG 171 (H) 12/16/2023    CHOL 185 12/16/2023    HDL 49.3 12/16/2023     Lab Results   Component Value Date    HGBA1C 8.5 (A) 06/18/2024       Assessment/Plan   Problem List Items Addressed This Visit    None    General Patient Discussion  As requested, patient has provided a very detailed blood glucose log (see above)!  Appears that patient may have prolonged hyperglycemia following dinner- patient amenable to increasing dinnertime Humalog to 12 units.  Appears that patient may also have lower blood sugar following lunch/swimming- patient to observe over the next few days/weeks- if BG consistently lower 80s/70s during the 3-4 pm time, patient may decrease lunchtime Humalog to 8 units.   Patient to continue logging BG values.   At recent PCP appointment, goal BG also adjusted from  mg/dL on a daily basis to  mg/dL on a daily basis.   Despite this  change, patient's TIR has improved since last visit (see above)    Plan/Changes   Continue all meds under the continuation of care with the referring provider and clinical pharmacy team  Continue pre-breakfast Humalog at 10 units  Patient may decrease pre-lunch Humalog to 8 units if consistently observed lower BG around 3-4 pm.   Increase pre-dinner Humalog to 12 units    Upcoming Appointments  [facility]: 11/18/24  PCP: 12/9/24    Next Clinical Pharmacy Follow-Up  ~1 month, 8/9/24 at 11:00 am  Continue to assess BG and mealtime insulin dosing      Avani Pedroza, Kirill     Verbal consent to manage patient's drug therapy was obtained from the patient. They were informed they may decline to participate or withdraw from participation in pharmacy services at any time.

## 2024-07-17 ENCOUNTER — PHARMACY VISIT (OUTPATIENT)
Dept: PHARMACY | Facility: CLINIC | Age: 79
End: 2024-07-17
Payer: MEDICARE

## 2024-08-07 PROCEDURE — RXMED WILLOW AMBULATORY MEDICATION CHARGE

## 2024-08-07 NOTE — PROGRESS NOTES
"Today 08/12/2024:  Patient presents today for follow up after recent ER visit for SOB / bruised ribs.    ER visit  08/05/2024:  Bruised her right side (ribs) 1 week ago Saturday 08/10/2024 leaning into the washing machine.  She heard a pop.  She presented to the ER due to difficulty breathing and underwent CT chest 08/05/2024 showing incidental lung nodules (See Objective).  No evidence of pneumonia.  We discussed these results and reviewed the Fleischer Society recommendations of no follow up imaging recommended.  She denies coughing or wheezing.  Her pain level is improving.  She states she is able to move now. Recommended Salonpas 12 hours on and 12 hours off as needed for pain.  Advised to continue deep breathing.  ER notes reviewed:  \" Encounter Summary - SOB, right side rib pain, jumped in to washer (Last Received: 8/5/2024  9:31 AM) - Currently Viewing   ADT Interface Encounter: (Last Received: 8/5/2024  1:43 PM)  Reason for Visit    Reason for Visit -  Reason Comments   Shortness of Breath     Rib Injury Trying to catch her breath this am is hard. Sts had to climb into washing machine sat am and felt and heard a cracking on right rib side. Had immediate pain and has since but today woke w diff breathing.     Encounter Details    Encounter Details  Date Type Department Care Team (Latest Contact Info) Description   08/05/2024 7:53 AM EDT - Present Emergency [facility]   [address] [place] [address]   [phone]  Rony Rhodes MD   [address] [place] [address] [phone] (Work)   [phone] (Fax)  SOB, right side rib pain, jumped in to washer   --------------------    Diabetes:  Type II:  Is taking medications regularly, denies side effects.  Denies hypoglycemia, polyuria, polydipsia.  No new numbness or paresthesias of extremities. No syncope or dizziness. No blurred vision.  Lab Results   Component Value Date    HGBA1C 8.5 (A) 06/18/2024    HGBA1C 7.7 (H) " 03/18/2024    HGBA1C 6.6 (H) 12/16/2023     Lab Results   Component Value Date    LDLCALC 102 (H) 12/16/2023    CREATININE 1.42 (H) 05/23/2024      Lab Results   Component Value Date    GLUCOSE 108 (H) 05/23/2024    CALCIUM 9.8 05/23/2024     05/23/2024    K 3.8 05/23/2024    CO2 29 05/23/2024    CL 99 05/23/2024    BUN 18 05/23/2024    CREATININE 1.42 (H) 05/23/2024      Encounter Date: 03/04/24   ECG 12 lead (Clinic Performed)   Result Value    Ventricular Rate 79    Atrial Rate 79    NJ Interval 160    QRS Duration 80    QT Interval 370    QTC Calculation(Bazett) 424    P Axis -12    R Axis -10    T Axis 66    QRS Count 13    Q Onset 216    P Onset 136    P Offset 189    T Offset 401    QTC Fredericia 405    Narrative    Normal sinus rhythm  Possible Anterior infarct , age undetermined  Abnormal ECG  When compared with ECG of 30-JUN-2023 10:38,  Questionable change in QRS axis  Non-specific change in ST segment in Inferior leads  Confirmed by Alma Pavon ([id number]) on 3/6/2024 2:02:07 PM     Follow up 09/16/2024.    ROS:  See ROS in HPI.    Objective:  CT Chest and abd pelvis 08/05/2024:   Lung parenchyma and airways: Central airways are patent.  No   consolidation.  Scattered curvilinear and reticular opacities   representing subsegmental atelectasis/scarring.  2 mm nodule RIGHT middle   lobe (image 116), 3 mm nodule RIGHT middle lobe (image 137) and 4 mm   nodule posterior LEFT lower lobe (image 161).     Pleural space:  No pleural effusion, pleural thickening or pneumothorax.     Lower neck, lymph nodes, and mediastinum:  The imaged thyroid gland is   normal.  No lympadenopathy in the supraclavicular, axillary, mediastinal,   or hilar regions.     Heart, pericardium, and thoracic vessels:  Thoracic aorta is normal in   caliber and branching pattern with mild atherosclerotic calcifications.    Main pulmonary artery is normal caliber.  Cardiac chambers are normal in   size.  Multifocal coronary artery  atherosclerotic calcifications in the   LEFT and RIGHT coronary circulations, although the study is not optimized   for coronary assessment.  No pericardial effusion.     Bones and soft tissues:  No acute fracture.  Multilevel moderate-severe   disc height loss with endplate degenerative changes in the thoracic spine   and visualized lower cervical spine.  Osteopenia.     Abdomen / Pelvis:     Lack of intravenous contrast limits evaluation of solid organ pathology   and vasculature.     Liver: Normal unenhanced liver.     Biliary: The gallbladder is unremarkable.     Spleen: No splenomegaly.     Pancreas: Unremarkable.     Adrenals: No mass.     Kidneys: No calculus, hydronephrosis or finding to suggest a cyst or mass   in the unenhanced kidney.     GI Tract: No bowel dilation.  Normal appendix.     Lymph Nodes: No lymphadenopathy.     Mesentery/peritoneum: No ascites.     Retroperitoneum: No mass.     Vasculature: Arterial atherosclerotic disease without aneurysm.     Pelvis: No mass or ascites.     Bones/Soft Tissues: No acute fracture.  Osteopenia.  Chronic RIGHT L5   pars defect.  Degenerative changes in the lumbar spine.   Contains abnormal data CT chest wo IV contrast  Order: [id number]  IMPRESSION:for ct chest:  No acute traumatic injury in the chest, abdomen or pelvis.  Few small (4 mm or less) pulmonary nodules.  Incidental Finding:  Follow-up  Acuity: Incidental  Finding: Solid: <6 mm (solitary or multiple)  Routing Code:  N/A  Recommendation: No imaging follow-up is recommended  Time Frame:  N/A  Comments:  If there are risk factors for lung malignancy, a follow-up  chest CT exam could be obtained in 12 months  --END OF FINDING--       Last Lab Results:    Chemistry    Lab Results   Component Value Date/Time     05/23/2024 1045    K 3.8 05/23/2024 1045    CL 99 05/23/2024 1045    CO2 29 05/23/2024 1045    BUN 18 05/23/2024 1045    CREATININE 1.42 (H) 05/23/2024 1045    Lab Results   Component Value  "Date/Time    CALCIUM 9.8 05/23/2024 1045    ALKPHOS 75 05/23/2024 1045    AST 25 05/23/2024 1045    ALT 31 05/23/2024 1045    BILITOT 1.1 05/23/2024 1045           Lab Results   Component Value Date    WBC 7.7 05/23/2024    HGB 16.1 (H) 05/23/2024    HCT 48.8 (H) 05/23/2024    MCV 89 05/23/2024     05/23/2024      Lab Results   Component Value Date    HGBA1C 8.5 (A) 06/18/2024      Lab Results   Component Value Date    LDLCALC 102 (H) 12/16/2023      No results found for: \"ALBUR\", \"[id number]\"   Lab Results   Component Value Date    [id number] 770 05/19/2023      Lab Results   Component Value Date    TSH 1.48 05/19/2023      No results found for: \"PSA\"   Lab Results   Component Value Date    CHOL 185 12/16/2023    CHOL 150 09/16/2023    CHOL 132 05/19/2023     Lab Results   Component Value Date    HDL 49.3 12/16/2023    HDL 42.5 09/16/2023    HDL 37.9 (A) 05/19/2023     Lab Results   Component Value Date    LDLCALC 102 (H) 12/16/2023     Lab Results   Component Value Date    TRIG 171 (H) 12/16/2023    TRIG 143 09/16/2023    TRIG 184 (H) 05/19/2023       Current Outpatient Medications   Medication Instructions    amLODIPine (NORVASC) 10 mg, oral, Daily    aspirin 81 mg EC tablet 1 tablet, oral, Daily    blood sugar diagnostic strip 1 strip, miscellaneous, 3 times daily, ONE TOUCH ULTRA 2 TEST STRIPS    carvedilol (COREG) 25 mg, oral, 2 times daily (morning and late afternoon)    cholecalciferol (VITAMIN D-3) 25 mcg, oral, Daily    cranberry fruit (cranberry) 450 mg tablet 1 tablet, oral, 2 times daily    cyanocobalamin, vitamin B-12, 1,000 mcg tablet, sublingual sublingual, one tab sublingual daily five days per week, monday thru friday    diabetic supplies, miscellan. misc miscellaneous, Onetouch ultrasoft lancets misc    empagliflozin (Jardiance) 25 mg TAKE 1 TABLET BY MOUTH EVERY MORNING    ezetimibe (ZETIA) 10 mg, oral, Daily, DAILY    fluvastatin (LESCOL) 40 mg, oral, Nightly    FreeStyle Cory sensor " "system (FreeStyle Cory 2 Sensor) kit Use as instructed    hydroCHLOROthiazide (HYDRODIURIL) 12.5 mg, oral, Daily, DAILY    insulin lispro (HUMALOG) 10 Units, subcutaneous, 3 times daily (morning, midday, late afternoon), Take as directed per insulin instructions    lancets (MICROLET LANCET MISC) miscellaneous, Test sugar 6 times a day    Lantus Solostar U-100 Insulin 35 Units, subcutaneous, Every morning, Take as directed per insulin instructions.    loratadine (CLARITIN REDITABS) 10 mg, oral, Daily    MAGNESIUM CITRATE ORAL 1 tablet, oral, Nightly, MAGNESIUM CITRATE 200 MG    meclizine (ANTIVERT) 12.5 mg, oral, Every 24 hours    omega-3/dha/epa/fish oil (OMEGA-3 ORAL) 1 capsule, oral, 2 times daily, 1,000 MG 2 TIMES A DAY    Ozempic 2 mg, subcutaneous, Once Weekly    psyllium (Metamucil) 3.4 gram packet oral, Nightly, Dissolve 1 tsp in 8 oz of water and drink daily at bedtime and not near other medication, forconstipation     .  Allergies   Allergen Reactions    Propoxyphene N-Acetaminophen Drowsiness    Adhesive Tape-Silicones Other     Steristrips - takes skin off    Cefaclor Palpitations    Latex Rash       Physical Exam:      3/25/2024     1:32 PM 3/25/2024     2:17 PM 6/5/2024     9:07 AM 6/10/2024     9:03 AM 6/11/2024     2:15 PM 6/18/2024     8:49 AM 8/12/2024     3:49 PM   Vitals   Systolic 104 126 123 114 148 112 132   Diastolic 50 64 74 71 72 52 58   Heart Rate 72  83 75 66 72 64   Temp     36.9 °C (98.4 °F)     Resp 18     18 16   Height (in) 1.499 m (4' 11\")  1.499 m (4' 11\") 1.499 m (4' 11\")  1.524 m (5') 1.511 m (4' 11.5\")   Weight (lb) 152  145 145 148.15 147 150   BMI 30.7 kg/m2  29.29 kg/m2 29.29 kg/m2 29.92 kg/m2 28.71 kg/m2 29.79 kg/m2   BSA (m2) 1.69 m2  1.66 m2 1.66 m2 1.67 m2 1.68 m2 1.69 m2   Visit Report Report Report Report Report Report Report Report       Patient looks well and is in no obvious distress.  HEENT:   Eyes: PERRL, EOMI.Sclera and conjunctiva are clear.  Ears: External ears, " TM's and canals normal.  Nose:Nares without lesions or discharge.  Oropharynx: moist mucous membranes, no lesions. No swelling erythema or exudate in the posterior pharynx.Tongue is not coated.  Neck: No adenopathy cervical (Ant/post/lat), no Supraclavicular nodes.   Thyroid normal.  Carotid pulses normal, no carotid bruits.  Lungs : Clear to auscultation anterior, posterior and lateral. No rales, wheezes rhonchi or rubs. Good air exchange.  Has some mild tenderness to palpation along injured right anterior lower ribs but no crepitance on palpation or auscultation.  Heart: RRR. No Murmur, gallop, click or rub.  Abdomen: Bowel sounds normal, No bruits. No pulsatile mass. No hepatosplenomegaly, masses or tenderness. Soft, no guarding.  Extremities: no upper or lower extremity edema.   Musculoskeletal: no synovitis of joints seen. No deformity.  Neuro: CN 2-12 intact. Alert, appropriate. Ambulates independently and no gross motor deficit. No tremors.  Psych: normal mood and affect.     Diagnoses and all orders for this visit:  Rib contusion, right, subsequent encounter  Incidental lung nodule, less than or equal to 3mm  Incidental lung nodule, > 3mm and < 8mm  Encounter for examination following treatment at hospital  Type 2 diabetes mellitus with insulin therapy (Multi)  Essential hypertension     Lung nodules do not require follow up.  Other issues stable and rib pain is resolving, she has a new way to get clothes out of the washer, she does not want to buy a front loading washer.  Will keep her follow up  Osteopenia on ct is known and managed.    ------------------------  I am the Internal Medicine physician providing ongoing chronic medical care for this patient, which is managed during and in between office visits.  Scribe Attestation  By signing my name below, I, Teresa Bro, Sukhdev   attest that this documentation has been prepared under the direction and in the presence of Jennifer Kenney MD.

## 2024-08-09 ENCOUNTER — PHARMACY VISIT (OUTPATIENT)
Dept: PHARMACY | Facility: CLINIC | Age: 79
End: 2024-08-09
Payer: MEDICARE

## 2024-08-09 ENCOUNTER — APPOINTMENT (OUTPATIENT)
Dept: PHARMACY | Facility: HOSPITAL | Age: 79
End: 2024-08-09
Payer: MEDICARE

## 2024-08-09 DIAGNOSIS — Z79.4 TYPE 2 DIABETES MELLITUS WITH INSULIN THERAPY (MULTI): ICD-10-CM

## 2024-08-09 DIAGNOSIS — E11.9 TYPE 2 DIABETES MELLITUS WITH INSULIN THERAPY (MULTI): ICD-10-CM

## 2024-08-09 RX ORDER — INSULIN GLARGINE 100 [IU]/ML
35 INJECTION, SOLUTION SUBCUTANEOUS EVERY MORNING
Qty: 30 ML | Refills: 3 | Status: SHIPPED | OUTPATIENT
Start: 2024-08-09

## 2024-08-09 RX ORDER — INSULIN LISPRO 100 [IU]/ML
10 INJECTION, SOLUTION INTRAVENOUS; SUBCUTANEOUS
Qty: 30 ML | Refills: 2 | Status: SHIPPED | OUTPATIENT
Start: 2024-08-09

## 2024-08-09 ASSESSMENT — ENCOUNTER SYMPTOMS: DIABETIC ASSOCIATED SYMPTOMS: 0

## 2024-08-09 NOTE — PROGRESS NOTES
Pharmacy [facility]   Follow Up  Patient is sent at the request of Alma Pavon MD for medication management.  My final recommendations will be communicated back to the requesting provider by way of shared medical record.  Subjective   Diabetes  She presents for her follow-up diabetic visit. She has type 2 diabetes mellitus. Her disease course has been improving. There are no hypoglycemic associated symptoms. There are no diabetic associated symptoms. There are no hypoglycemic complications. Symptoms are stable. Risk factors for coronary artery disease include diabetes mellitus, dyslipidemia, hypertension and obesity. She is compliant with treatment all of the time. Her overall blood glucose range is 140-180 mg/dl. An ACE inhibitor/angiotensin II receptor blocker is not being taken. Eye exam is current (Patient just saw her optometrist for her reitonpathy and she notes they are pleased with her progress.).     Cardiovascular risk factors include advanced age (older than 55 for men, 65 for women), diabetes mellitus, dyslipidemia, hypertension, and obesity (BMI >= 30 kg/m2). The patient is not on an ACE inhibitor or angiotensin II receptor blocker.  The patient has not been previously hospitalized due to diabetic ketoacidosis.     At last visit, we titrated up her Ozempic to 1 mg weekly and titrated her Lantus down to 35 units. Patient presents today via telephone to assess glycemic control and maybe adjust insulin and/or Ozempic. Of note, patient's daughter has recently passed away so her diet has been a little off and she stopped going swimming.     Pharmacotherapy   Lantus solostar 35 units subcutaneous once daily AM  Humalog KwikPen 10-8-12 units TID AC   Ozempic 2 mg inject 2 mg under the skin once a week on Wednesdays   Jardiance 25 mg once daily    Previous Pharmacotherapy    Humulin N insulin   Metformin - d/c'ed by Dr. Shelley in 2020     Current monitoring regimen:  Patient is using continuous glucose  monitor; FreeStyle Cory 2; not connected to smartphone     Reported SMBG Measurements  BG Assorted (14 day) Notes   Pre-Breakfast (7-7:30 am) 90, 140, 150     Post-Breakfast 2 hour 88, 122, 110     Swimming (11-11:30 am) N/a Drinks apple-juice before swimming if BG lower ~90   Post-Swimming/Pre-lunch (12 pm) 140, 110, 169     Post-Lunch 2 hour 135, 164, 124, 95     Pre-Dinner (5:30-6 pm) 156, 79,  140, 197     Post-Dinner 2 hour 123, 80, 138     Bedtime (10:30 pm) 138, 80, 100     14 day average: 162 mg/dL    Previous BG Measurements  BG Assorted (14 day) Notes   Pre-Breakfast (7-7:30 am) 160s     Post-Breakfast 2 hour 120s-160s     Swimming (11-11:30 am) N/A Drinks apple-juice before swimming if BG lower ~90   Post-Swimming (12 pm) 128, 121     Pre-Lunch (12 pm) 96, 110     Post-Lunch 2 hour 137, 132, 138     3-4 pm notices BG drops unknown May eat popcorn or fruit   Pre-Dinner (5:30-6 pm) 109, 150, 142, 157     Post-Dinner 2 hour 150, 198, 265     Bedtime (10:30 pm) 144, 157, 188         5/17/24 -- 14 day average: 184 mg/dL  Time Range BG (mg/dL)   6a -12p 199   12p - 6p 161   6p -12a 184     3/15/24 -- 14 day average: 204 mg/dL  Time Range BG (mg/dL)   6a -12p 192   12p - 6p 197   6p -12a 233   TIR: 37%  High: 63%  Low: 0%      Any episodes of hypoglycemia? Patient notes that since the passing of her daughter, she noticed a few low blood sugar readings 2 hours after breakfast.     Physical Activity: Goes to a virtual exercise class 3 times a week and will go back to swimming 2 times a week next week    Adverse Effects:   Patient denies any GI adverse effects (Upset stomach/diarrhea/constipation/nausea/vomiting)  Patient denies any urinary side effects from the Jardiance     Reported Weight:  Baseline: 174 lbs   Current: 150 lbs     Adherence: no issues reported   Affordability/Accessibility: Patient has met her deductible so now her Ozempic is $30/month.    Objective   Labs:   Lab Results   Component Value Date     BILITOT 1.1 05/23/2024    CALCIUM 9.8 05/23/2024    CO2 29 05/23/2024    CL 99 05/23/2024    CREATININE 1.42 (H) 05/23/2024    GLUCOSE 108 (H) 05/23/2024    ALKPHOS 75 05/23/2024    K 3.8 05/23/2024    PROT 6.9 05/23/2024    PROT 6.9 05/23/2024     05/23/2024    AST 25 05/23/2024    ALT 31 05/23/2024    BUN 18 05/23/2024    ANIONGAP 14 05/23/2024    MG 2.67 (H) 12/16/2023    PHOS 3.8 05/23/2024    ALBUMIN 4.4 05/23/2024    GFRF 37 (A) 09/16/2023     Lab Results   Component Value Date    TRIG 171 (H) 12/16/2023    CHOL 185 12/16/2023    LDLCALC 102 (H) 12/16/2023    HDL 49.3 12/16/2023     Lab Results   Component Value Date    HGBA1C 8.5 (A) 06/18/2024    HGBA1C 7.7 (H) 03/18/2024    HGBA1C 6.6 (H) 12/16/2023       Assessment/Plan   Problem List Items Addressed This Visit          Endocrine/Metabolic    Type 2 diabetes mellitus with insulin therapy (Multi)    Relevant Medications    insulin glargine (Lantus Solostar U-100 Insulin) 100 unit/mL (3 mL) pen    insulin lispro (HumaLOG) 100 unit/mL injection     Patients diabetes is worsening with most recent A1c of 8.5% (goal < 7 %) up from 6.6% in December. Patient's SMBG have become better controlled. BG fluctuate depending on what she eats which patient is aware of. Patient did not take Humalog 10-8-12 as instructed at last visit. Will continue on 10 units with meals. Patient has been tolerating both Ozempic and Jardiance with no side effects.     Patient has not experienced low blood sugar readings anymore. Patient does admit to losing about 25 lbs since starting Ozempic. Patient has continued to do virtual dynamic stretch exercises ([employer]) three times a week with her sister and water aerobics the other 2 days of the week.     Plan:  CONTINUE  - Jardiance 25 mg daily (eGFR 36)  - Humalog 10 units with meals  - Lantus 35 units once daily  - Ozempic 2 mg once weekly     Labs ordered: none     Follow up: 8/30/24  CINEMA Follow Up: 11/18/24    Provided  contact info and encouraged patient to reach out with any concerns or questions regarding medications.        Elma Connor, Kirill    Continue all meds under the continuation of care with the referring provider and clinical pharmacy team.

## 2024-08-12 ENCOUNTER — APPOINTMENT (OUTPATIENT)
Dept: PRIMARY CARE | Facility: CLINIC | Age: 79
End: 2024-08-12
Payer: MEDICARE

## 2024-08-12 VITALS
DIASTOLIC BLOOD PRESSURE: 58 MMHG | HEIGHT: 60 IN | RESPIRATION RATE: 16 BRPM | BODY MASS INDEX: 29.45 KG/M2 | HEART RATE: 64 BPM | WEIGHT: 150 LBS | SYSTOLIC BLOOD PRESSURE: 132 MMHG

## 2024-08-12 DIAGNOSIS — R91.1 INCIDENTAL LUNG NODULE, > 3MM AND < 8MM: ICD-10-CM

## 2024-08-12 DIAGNOSIS — S20.211D RIB CONTUSION, RIGHT, SUBSEQUENT ENCOUNTER: Primary | ICD-10-CM

## 2024-08-12 DIAGNOSIS — I10 ESSENTIAL HYPERTENSION: ICD-10-CM

## 2024-08-12 DIAGNOSIS — E11.9 TYPE 2 DIABETES MELLITUS WITH INSULIN THERAPY (MULTI): ICD-10-CM

## 2024-08-12 DIAGNOSIS — Z09 ENCOUNTER FOR EXAMINATION FOLLOWING TREATMENT AT HOSPITAL: ICD-10-CM

## 2024-08-12 DIAGNOSIS — R91.1 INCIDENTAL LUNG NODULE, LESS THAN OR EQUAL TO 3MM: ICD-10-CM

## 2024-08-12 DIAGNOSIS — Z79.4 TYPE 2 DIABETES MELLITUS WITH INSULIN THERAPY (MULTI): ICD-10-CM

## 2024-08-12 PROCEDURE — 1126F AMNT PAIN NOTED NONE PRSNT: CPT | Performed by: INTERNAL MEDICINE

## 2024-08-12 PROCEDURE — 3075F SYST BP GE 130 - 139MM HG: CPT | Performed by: INTERNAL MEDICINE

## 2024-08-12 PROCEDURE — 1159F MED LIST DOCD IN RCRD: CPT | Performed by: INTERNAL MEDICINE

## 2024-08-12 PROCEDURE — 1157F ADVNC CARE PLAN IN RCRD: CPT | Performed by: INTERNAL MEDICINE

## 2024-08-12 PROCEDURE — 99213 OFFICE O/P EST LOW 20 MIN: CPT | Performed by: INTERNAL MEDICINE

## 2024-08-12 PROCEDURE — 1160F RVW MEDS BY RX/DR IN RCRD: CPT | Performed by: INTERNAL MEDICINE

## 2024-08-12 PROCEDURE — 3078F DIAST BP <80 MM HG: CPT | Performed by: INTERNAL MEDICINE

## 2024-08-12 PROCEDURE — G2211 COMPLEX E/M VISIT ADD ON: HCPCS | Performed by: INTERNAL MEDICINE

## 2024-08-12 ASSESSMENT — PAIN SCALES - GENERAL: PAINLEVEL: 0-NO PAIN

## 2024-08-12 NOTE — PATIENT INSTRUCTIONS
Use the salon pas, 12 hours on and 12 hours off. Continue the deep breathing.  The lung nodules are tiny and in a patient without a smoking history or cancer history, no follow up is needed.    Keep regular follow up.

## 2024-08-30 ENCOUNTER — APPOINTMENT (OUTPATIENT)
Dept: PHARMACY | Facility: HOSPITAL | Age: 79
End: 2024-08-30
Payer: MEDICARE

## 2024-08-30 DIAGNOSIS — Z79.4 TYPE 2 DIABETES MELLITUS WITH INSULIN THERAPY (MULTI): Primary | ICD-10-CM

## 2024-08-30 DIAGNOSIS — E11.9 TYPE 2 DIABETES MELLITUS WITH INSULIN THERAPY (MULTI): Primary | ICD-10-CM

## 2024-08-30 PROCEDURE — RXMED WILLOW AMBULATORY MEDICATION CHARGE

## 2024-08-30 ASSESSMENT — ENCOUNTER SYMPTOMS: DIABETIC ASSOCIATED SYMPTOMS: 0

## 2024-08-30 NOTE — PROGRESS NOTES
Pharmacy [facility]   Follow Up  Patient is sent at the request of Alma Pavon MD for medication management.  My final recommendations will be communicated back to the requesting provider by way of shared medical record.  Subjective   Diabetes  She presents for her follow-up diabetic visit. She has type 2 diabetes mellitus. Her disease course has been improving. There are no hypoglycemic associated symptoms. There are no diabetic associated symptoms. There are no hypoglycemic complications. Symptoms are stable. Risk factors for coronary artery disease include diabetes mellitus, dyslipidemia, hypertension and obesity. She is compliant with treatment all of the time. Her overall blood glucose range is 140-180 mg/dl. An ACE inhibitor/angiotensin II receptor blocker is not being taken. Eye exam is current (Patient just saw her optometrist for her reitonpathy and she notes they are pleased with her progress.).     Cardiovascular risk factors include advanced age (older than 55 for men, 65 for women), diabetes mellitus, dyslipidemia, hypertension, and obesity (BMI >= 30 kg/m2). The patient is not on an ACE inhibitor or angiotensin II receptor blocker.  The patient has not been previously hospitalized due to diabetic ketoacidosis.     At last visit, we titrated up her Ozempic to 1 mg weekly and titrated her Lantus down to 35 units. Patient presents today via telephone to assess glycemic control and maybe adjust insulin and/or Ozempic. Of note, patient's daughter has recently passed away so her diet has been a little off and she stopped going swimming.     Pharmacotherapy   Lantus solostar 35 units subcutaneous once daily AM  Humalog KwikPen 10-8-12 units TID AC   Ozempic 2 mg inject 2 mg under the skin once a week on Wednesdays   Jardiance 25 mg once daily    Previous Pharmacotherapy    Humulin N insulin   Metformin - d/c'ed by Dr. Shelley in 2020     Current monitoring regimen:  Patient is using continuous glucose  monitor; FreeStyle Cory 2; not connected to smartphone   14 day average: 142 mg/dL  TIR: 67% ()  High: 32%  Low 1%   Experienced lows in the 60s with symptoms.     Previous BG Measurements  BG Assorted (14 day) Notes   Pre-Breakfast (7-7:30 am) 90, 140, 150     Post-Breakfast 2 hour 88, 122, 110     Swimming (11-11:30 am) N/a Drinks apple-juice before swimming if BG lower ~90   Post-Swimming/Pre-lunch (12 pm) 140, 110, 169     Post-Lunch 2 hour 135, 164, 124, 95     Pre-Dinner (5:30-6 pm) 156, 79,  140, 197     Post-Dinner 2 hour 123, 80, 138     Bedtime (10:30 pm) 138, 80, 100       BG Assorted (14 day) Notes   Pre-Breakfast (7-7:30 am) 160s     Post-Breakfast 2 hour 120s-160s     Swimming (11-11:30 am) N/A Drinks apple-juice before swimming if BG lower ~90   Post-Swimming (12 pm) 128, 121     Pre-Lunch (12 pm) 96, 110     Post-Lunch 2 hour 137, 132, 138     3-4 pm notices BG drops unknown May eat popcorn or fruit   Pre-Dinner (5:30-6 pm) 109, 150, 142, 157     Post-Dinner 2 hour 150, 198, 265     Bedtime (10:30 pm) 144, 157, 188         5/17/24 -- 14 day average: 184 mg/dL  Time Range BG (mg/dL)   6a -12p 199   12p - 6p 161   6p -12a 184     3/15/24 -- 14 day average: 204 mg/dL  Time Range BG (mg/dL)   6a -12p 192   12p - 6p 197   6p -12a 233   TIR: 37%  High: 63%  Low: 0%      Any episodes of hypoglycemia? Patient notes that since the passing of her daughter, she noticed a few low blood sugar readings 2 hours after breakfast.     Physical Activity: Goes to a virtual exercise class 3 times a week and will go back to swimming 2 times a week next week    Adverse Effects:   Patient denies any GI adverse effects (Upset stomach/diarrhea/constipation/nausea/vomiting)  Patient denies any urinary side effects from the Jardiance     Reported Weight:  Baseline: 174 lbs   Current: 150 lbs     Adherence: no issues reported   Affordability/Accessibility: Patient has met her deductible so now her Ozempic is  $30/month.    Objective   Labs:   Lab Results   Component Value Date    BILITOT 1.1 05/23/2024    CALCIUM 9.8 05/23/2024    CO2 29 05/23/2024    CL 99 05/23/2024    CREATININE 1.42 (H) 05/23/2024    GLUCOSE 108 (H) 05/23/2024    ALKPHOS 75 05/23/2024    K 3.8 05/23/2024    PROT 6.9 05/23/2024    PROT 6.9 05/23/2024     05/23/2024    AST 25 05/23/2024    ALT 31 05/23/2024    BUN 18 05/23/2024    ANIONGAP 14 05/23/2024    MG 2.67 (H) 12/16/2023    PHOS 3.8 05/23/2024    ALBUMIN 4.4 05/23/2024    GFRF 37 (A) 09/16/2023     Lab Results   Component Value Date    TRIG 171 (H) 12/16/2023    CHOL 185 12/16/2023    LDLCALC 102 (H) 12/16/2023    HDL 49.3 12/16/2023     Lab Results   Component Value Date    HGBA1C 8.5 (A) 06/18/2024    HGBA1C 7.7 (H) 03/18/2024    HGBA1C 6.6 (H) 12/16/2023       Assessment/Plan   Problem List Items Addressed This Visit          Endocrine/Metabolic    Type 2 diabetes mellitus with insulin therapy (Multi) - Primary    Relevant Medications    empagliflozin (Jardiance) 25 mg       Patients diabetes is worsening with most recent A1c of 8.5% (goal < 7 %) up from 6.6% in December. Patient's SMBG have become better controlled Averaging 140s the last 14 days. BG fluctuate depending on what she eats which patient is aware of. She does admit to experiencing BG in the 60s with symptoms. Will decrease Lantus to 32 units. Patient has been tolerating both Ozempic and Jardiance with no side effects. She has been able to start water aerobics back up in the last week after her rib accident.     Patient has not experienced low blood sugar readings anymore. Patient does admit to losing about 25 lbs since starting Ozempic. Patient has continued to do virtual dynamic stretch exercises ([employer]) three times a week with her sister and water aerobics the other 2 days of the week.     Plan:  DECREASE  - Lantus 35 to 32 units once daily    CONTINUE  - Jardiance 25 mg daily (eGFR 37)  - Humalog 10 units with  meals  - Ozempic 2 mg once weekly     Labs ordered: none     Follow up: 9/27/24  CINEMA Follow Up: 11/18/24    Provided contact info and encouraged patient to reach out with any concerns or questions regarding medications.        Elma Connor, PharmD    Continue all meds under the continuation of care with the referring provider and clinical pharmacy team.

## 2024-09-04 ENCOUNTER — PHARMACY VISIT (OUTPATIENT)
Dept: PHARMACY | Facility: CLINIC | Age: 79
End: 2024-09-04
Payer: MEDICARE

## 2024-09-09 ENCOUNTER — HOSPITAL ENCOUNTER (OUTPATIENT)
Dept: RADIOLOGY | Facility: CLINIC | Age: 79
Discharge: HOME | End: 2024-09-09
Payer: MEDICARE

## 2024-09-09 DIAGNOSIS — M85.852 OSTEOPENIA OF NECK OF LEFT FEMUR: ICD-10-CM

## 2024-09-09 PROCEDURE — 77080 DXA BONE DENSITY AXIAL: CPT

## 2024-09-09 NOTE — RESULT ENCOUNTER NOTE
Please call the patient regarding her result.  TThe bone density showed some bone thinning,called osteopenia, but not osteoporosis.   Recommend:   1.Keep up with calcium intake (diet and or supplement, 1200 mg daily total).   2.Keep up with Vitamin D (either dose we have discussed or 800-1000iu daily).  3.Weight bearing exercise. The National Osteoporosis Website has instructions for exercise to strengthen bones.  4.Bone DEXA should be repeated in 2 years.  We can review further at the next appt. if any questions.

## 2024-09-10 ENCOUNTER — LAB (OUTPATIENT)
Dept: LAB | Facility: LAB | Age: 79
End: 2024-09-10
Payer: MEDICARE

## 2024-09-10 DIAGNOSIS — E53.8 B12 NUTRITIONAL DEFICIENCY: ICD-10-CM

## 2024-09-10 DIAGNOSIS — I25.10 CORONARY ARTERY DISEASE INVOLVING NATIVE CORONARY ARTERY OF NATIVE HEART WITHOUT ANGINA PECTORIS: ICD-10-CM

## 2024-09-10 DIAGNOSIS — Z51.81 ENCOUNTER FOR MONITORING STATIN THERAPY: ICD-10-CM

## 2024-09-10 DIAGNOSIS — Z79.899 ENCOUNTER FOR MONITORING STATIN THERAPY: ICD-10-CM

## 2024-09-10 DIAGNOSIS — E11.65 TYPE 2 DIABETES MELLITUS WITH HYPERGLYCEMIA, WITH LONG-TERM CURRENT USE OF INSULIN (MULTI): ICD-10-CM

## 2024-09-10 DIAGNOSIS — Z79.4 TYPE 2 DIABETES MELLITUS WITH HYPERGLYCEMIA, WITH LONG-TERM CURRENT USE OF INSULIN (MULTI): ICD-10-CM

## 2024-09-10 LAB
ALT SERPL W P-5'-P-CCNC: 25 U/L (ref 7–45)
AST SERPL W P-5'-P-CCNC: 22 U/L (ref 9–39)
CHOLEST SERPL-MCNC: 136 MG/DL (ref 0–199)
CHOLESTEROL/HDL RATIO: 3.2
CK SERPL-CCNC: 40 U/L (ref 0–215)
HDLC SERPL-MCNC: 42.4 MG/DL
LDLC SERPL CALC-MCNC: 60 MG/DL
LDLC SERPL DIRECT ASSAY-MCNC: 78 MG/DL (ref 0–129)
NON HDL CHOLESTEROL: 94 MG/DL (ref 0–149)
TRIGL SERPL-MCNC: 168 MG/DL (ref 0–149)
TSH SERPL-ACNC: 2.43 MIU/L (ref 0.44–3.98)
VIT B12 SERPL-MCNC: 839 PG/ML (ref 211–911)
VLDL: 34 MG/DL (ref 0–40)

## 2024-09-10 PROCEDURE — 84450 TRANSFERASE (AST) (SGOT): CPT

## 2024-09-10 PROCEDURE — 84460 ALANINE AMINO (ALT) (SGPT): CPT

## 2024-09-10 PROCEDURE — 83721 ASSAY OF BLOOD LIPOPROTEIN: CPT

## 2024-09-10 PROCEDURE — 84443 ASSAY THYROID STIM HORMONE: CPT

## 2024-09-10 PROCEDURE — 36415 COLL VENOUS BLD VENIPUNCTURE: CPT

## 2024-09-10 PROCEDURE — 82607 VITAMIN B-12: CPT

## 2024-09-10 PROCEDURE — 80061 LIPID PANEL: CPT

## 2024-09-10 PROCEDURE — 82550 ASSAY OF CK (CPK): CPT

## 2024-09-12 NOTE — PROGRESS NOTES
Patient ID: Eleonora Varela is a 79 y.o. female who presents for Follow-up (Pt here for follow up and review. Pt denies any new problems or concerns at this time. )  LAST VISIT PLAN FROM: 08/12/2024  Lung nodules do not require follow up.  Other issues stable and rib pain is resolving, she has a new way to get clothes out of the washer, she does not want to buy a front loading washer.  Will keep her follow up  Osteopenia on ct is known and managed.  END LAST VISIT PLAN  -------------------------------------------  HPI  Patient is a 79-year-old female who presents today for follow up.    Since last visit, patient underwent DEXA scan on 09/09/2024 (See Objective).  We discussed her recent DEXA scan showing osteopenia of left femur neck with BMD of 0.828 with T-score of-1.5, left femur total was normal and spine was normal.  She is taking a vitamin D supplement.  Last DEXA 04/04/2022 showed left femur neck BMD at 0.827 with T-score of -1.5.  She reports doing weight bearing exercises and swimming.    Hypertension:  BP today is 106/50.  She follows with Cardiology, Dr. Cook, and Dr. Pavon ([facility]).   Follow up on cardiovascular conditions:  Cardiac:   Chest pain?No  Palpitations? No  Increased snyder?  No  Other:  Resp:   Shortness of breath?No  Wheezing No  Cough No  Other:  Extremities:   Leg or ankle swelling?No   Claudication?No  Other:  Neuro:  DizzinessNo  SyncopeNo   Blurred visionNo  New headaches No  Other:  Medications:   Current Outpatient Medications   Medication Instructions    amLODIPine (NORVASC) 10 mg, oral, Daily    aspirin 81 mg EC tablet 1 tablet, oral, Daily    blood sugar diagnostic strip 1 strip, miscellaneous, 3 times daily, ONE TOUCH ULTRA 2 TEST STRIPS    calcium carb/vit D3/minerals (CALCIUM 600 + MINERALS ORAL) 1,200 mg, oral, Daily    carvedilol (COREG) 25 mg, oral, 2 times daily (morning and late afternoon)    cholecalciferol (VITAMIN D-3) 25 mcg, oral, Daily    CRANBERRY ORAL 1 tablet,  oral, 2 times daily, 500 MG TABLETS    cyanocobalamin, vitamin B-12, 1,000 mcg tablet, sublingual sublingual, one tab sublingual daily five days per week, monday thru friday    diabetic supplies, miscellan. misc miscellaneous, Onetouch ultrasoft lancets misc    empagliflozin (Jardiance) 25 mg TAKE 1 TABLET BY MOUTH EVERY MORNING    ezetimibe (ZETIA) 10 mg, oral, Daily, DAILY    fluvastatin (LESCOL) 40 mg, oral, Nightly    FreeStyle Cory sensor system (FreeStyle Cory 2 Sensor) kit Use as instructed    hydroCHLOROthiazide (HYDRODIURIL) 12.5 mg, oral, Daily, DAILY    insulin lispro (HUMALOG) 10 Units, subcutaneous, 3 times daily (morning, midday, late afternoon), Take as directed per insulin instructions    lancets (MICROLET LANCET MISC) miscellaneous, Test sugar 6 times a day    Lantus Solostar U-100 Insulin 35 Units, subcutaneous, Every morning, Take as directed per insulin instructions.    loratadine (CLARITIN REDITABS) 10 mg, oral, Daily    MAGNESIUM CITRATE ORAL 1 tablet, oral, Nightly, MAGNESIUM CITRATE 200 MG    meclizine (ANTIVERT) 12.5 mg, oral, 3 times daily PRN    omega-3/dha/epa/fish oil (OMEGA-3 ORAL) 1 capsule, oral, 2 times daily, 1,200 MG 2 TIMES A DAY    Ozempic 2 mg, subcutaneous, Once Weekly    psyllium (Metamucil) 3.4 gram packet oral, Nightly, Dissolve 1 tsp in 8 oz of water and drink daily at bedtime and not near other medication, forconstipation      Taking them regularly.Yes  Side effects.No    Diabetes:  Type II:  Last A1c was 7.5% on 09/16/2024.  Is taking medications regularly, denies side effects.  Denies hypoglycemia, polyuria, polydipsia.  No new numbness or paresthesias of extremities. No syncope or dizziness. No blurred vision.  Lab Results   Component Value Date    HGBA1C 7.5 (A) 09/16/2024    HGBA1C 8.5 (A) 06/18/2024    HGBA1C 7.7 (H) 03/18/2024     Lab Results   Component Value Date    LDLCALC 60 09/10/2024    CREATININE 1.42 (H) 05/23/2024      Lab Results   Component Value Date     GLUCOSE 108 (H) 05/23/2024    CALCIUM 9.8 05/23/2024     05/23/2024    K 3.8 05/23/2024    CO2 29 05/23/2024    CL 99 05/23/2024    BUN 18 05/23/2024    CREATININE 1.42 (H) 05/23/2024      No results found for this or any previous visit (from the past 4464 hour(s)).     Lung Nodule:  Incidental finding on CT chest 08/05/2024.  Denies night sweats, fevers, cough, sputum production or shortness of breath. No pleuritic chest pain or wheezing. No dyspnea on exertion.     Rib Contusion:  Previous rib contusion has resolved.    Denies urinary concerns or symptoms of UTI today.    Social History:  Patient has an upcoming 60-year class reunion.    We discussed recommended vaccines including Covid booster now with influenza vaccine in October 2024.     Orders placed for blood work as required.    Orders placed for refills as required.    Follow up 12/17/2024.    Review of Systems  See ROS in HPI    Current Outpatient Medications   Medication Instructions    amLODIPine (NORVASC) 10 mg, oral, Daily    aspirin 81 mg EC tablet 1 tablet, oral, Daily    blood sugar diagnostic strip 1 strip, miscellaneous, 3 times daily, ONE TOUCH ULTRA 2 TEST STRIPS    calcium carb/vit D3/minerals (CALCIUM 600 + MINERALS ORAL) 1,200 mg, oral, Daily    carvedilol (COREG) 25 mg, oral, 2 times daily (morning and late afternoon)    cholecalciferol (VITAMIN D-3) 25 mcg, oral, Daily    CRANBERRY ORAL 1 tablet, oral, 2 times daily, 500 MG TABLETS    cyanocobalamin, vitamin B-12, 1,000 mcg tablet, sublingual sublingual, one tab sublingual daily five days per week, monday thru friday    diabetic supplies, miscellan. misc miscellaneous, Onetouch ultrasoft lancets misc    empagliflozin (Jardiance) 25 mg TAKE 1 TABLET BY MOUTH EVERY MORNING    ezetimibe (ZETIA) 10 mg, oral, Daily, DAILY    fluvastatin (LESCOL) 40 mg, oral, Nightly    FreeStyle Cory sensor system (FreeStyle Cory 2 Sensor) kit Use as instructed    hydroCHLOROthiazide (HYDRODIURIL)  12.5 mg, oral, Daily, DAILY    insulin lispro (HUMALOG) 10 Units, subcutaneous, 3 times daily (morning, midday, late afternoon), Take as directed per insulin instructions    lancets (MICROLET LANCET MISC) miscellaneous, Test sugar 6 times a day    Lantus Solostar U-100 Insulin 35 Units, subcutaneous, Every morning, Take as directed per insulin instructions.    loratadine (CLARITIN REDITABS) 10 mg, oral, Daily    MAGNESIUM CITRATE ORAL 1 tablet, oral, Nightly, MAGNESIUM CITRATE 200 MG    meclizine (ANTIVERT) 12.5 mg, oral, 3 times daily PRN    omega-3/dha/epa/fish oil (OMEGA-3 ORAL) 1 capsule, oral, 2 times daily, 1,200 MG 2 TIMES A DAY    Ozempic 2 mg, subcutaneous, Once Weekly    psyllium (Metamucil) 3.4 gram packet oral, Nightly, Dissolve 1 tsp in 8 oz of water and drink daily at bedtime and not near other medication, forconstipation     Allergies   Allergen Reactions    Propoxyphene N-Acetaminophen Drowsiness    Adhesive Tape-Silicones Other     Steristrips - takes skin off    Cefaclor Palpitations    Latex Rash     Objective    Results  reviewed:   DEXA Scan 09/09/2024:  FINDINGS:  SPINE L1-L4  Bone Mineral Density: 1.413  T-Score 1.8  Z-Score 3.6  Classification:  Not reported  Bone Mineral Density change vs baseline:  Not reported  Bone Mineral Density change vs previous: Not reported  LEFT FEMUR -TOTAL  Bone Mineral Density: 1.007  T-Score 0.0   Z-Score  1.9  Classification:  Not reported  Bone Mineral Density change vs baseline: Not reported  Bone Mineral Density change vs previous: Not reported  LEFT FEMUR -NECK  Bone Mineral Density: 0.828  T-Score -1.5  Z-Score 0.5  Classification:  Not reported  World Health Organization (WHO) criteria for post-menopausal,   Women:  Normal:         T-score at or above -1 SD  Osteopenia:   T-score between -1 and -2.5 SD  Osteoporosis: T-score at or below -2.5 SD  10-year Fracture Risk:  Major Osteoporotic Fracture  18.4  Hip Fracture                         "3.8  Note:  If no FRAX score is reported, it is because:  Some T-score for Spine Total or Hip Total or Femoral Neck at or below  -2.5  This exam was performed at [facility] on a Source Audio Dexa Unit.  IMPRESSION:  DEXA:  According to World Health Organization criteria,  classification is low bone mass (osteopenia)  Followup recommended in two years or sooner as clinically warranted.  All images and detailed analysis are available on the  Radiology  PACS.  MACRO:  None  Signed by: Smitha Morel 9/9/2024 12:34 PM  Dictation workstation:   [id number]    Latest Complete Lab Results:   Latest Reference Range & Units 09/10/24 07:07   ALT 7 - 45 U/L 25   AST 9 - 39 U/L 22   HDL CHOLESTEROL mg/dL 42.4   Cholesterol/HDL Ratio  3.2   LDL Calculated <=99 mg/dL 60   VLDL 0 - 40 mg/dL 34   TRIGLYCERIDES 0 - 149 mg/dL 168 (H)   Non HDL Cholesterol 0 - 149 mg/dL 94   Creatine Kinase 0 - 215 U/L 40   CHOLESTEROL 0 - 199 mg/dL 136   LDL, Direct 0 - 129 mg/dL 78   Vitamin B12 211 - 911 pg/mL 839   Thyroid Stimulating Hormone 0.44 - 3.98 mIU/L 2.43   (H): Data is abnormally high      Chemistry    Lab Results   Component Value Date/Time     05/23/2024 1045    K 3.8 05/23/2024 1045    CL 99 05/23/2024 1045    CO2 29 05/23/2024 1045    BUN 18 05/23/2024 1045    CREATININE 1.42 (H) 05/23/2024 1045    Lab Results   Component Value Date/Time    CALCIUM 9.8 05/23/2024 1045    ALKPHOS 75 05/23/2024 1045    AST 22 09/10/2024 0707    ALT 25 09/10/2024 0707    BILITOT 1.1 05/23/2024 1045           Lab Results   Component Value Date    WBC 7.7 05/23/2024    HGB 16.1 (H) 05/23/2024    HCT 48.8 (H) 05/23/2024    MCV 89 05/23/2024     05/23/2024      Lab Results   Component Value Date    HGBA1C 7.5 (A) 09/16/2024      Lab Results   Component Value Date    LDLCALC 60 09/10/2024      No results found for: \"ALBUR\", \"[id number]\"   Lab Results   Component Value Date    HIUFHIMA06 839 09/10/2024      Lab Results " "  Component Value Date    TSH 2.43 09/10/2024      No results found for: \"PSA\"   Lab Results   Component Value Date    CHOL 136 09/10/2024    CHOL 185 12/16/2023    CHOL 150 09/16/2023     Lab Results   Component Value Date    HDL 42.4 09/10/2024    HDL 49.3 12/16/2023    HDL 42.5 09/16/2023     Lab Results   Component Value Date    LDLCALC 60 09/10/2024    LDLCALC 102 (H) 12/16/2023     Lab Results   Component Value Date    TRIG 168 (H) 09/10/2024    TRIG 171 (H) 12/16/2023    TRIG 143 09/16/2023     Physical Exam  Vitals:      3/25/2024     2:17 PM 6/5/2024     9:07 AM 6/10/2024     9:03 AM 6/11/2024     2:15 PM 6/18/2024     8:49 AM 8/12/2024     3:49 PM 9/16/2024     9:04 AM   Vitals   Systolic 126 123 114 148 112 132 106   Diastolic 64 74 71 72 52 58 50   Heart Rate  83 75 66 72 64 60   Temp    36.9 °C (98.4 °F)      Resp     18 16 18   Height (in)  1.499 m (4' 11\") 1.499 m (4' 11\")  1.524 m (5') 1.511 m (4' 11.5\") 1.511 m (4' 11.5\")   Weight (lb)  145 145 148.15 147 150 147   BMI  29.29 kg/m2 29.29 kg/m2 29.92 kg/m2 28.71 kg/m2 29.79 kg/m2 29.19 kg/m2   BSA (m2)  1.66 m2 1.66 m2 1.67 m2 1.68 m2 1.69 m2 1.67 m2   Visit Report Report Report Report Report Report Report Report     Patient looks well and is in no obvious distress.  HEENT:   Normocephalic, no facial asymmetry  Eyes: Sclera and conjunctiva are clear.  Neck: No adenopathy cervical (Ant/post/lat), no Supraclavicular nodes.   Thyroid normal.  Carotid pulses normal, no carotid bruits.  Lungs : RR normal. Clear to auscultation anterior, posterior and lateral. No rales, wheezes rhonchi or rubs. Good air exchange.  Heart: RRR. No gallop, click or rub.  She has a 1/6 systolic murmur heard URSB.  Abdomen: Bowel sounds normal, No bruits. No pulsatile mass. No hepatosplenomegaly, masses or tenderness. Soft, no guarding.  Vascular:  Posterior tibialis and dorsalis pedis pulses within normal limits bilaterally.   Extremities: No upper extremity edema. No lower " extremity edema.   Musculoskeletal: No synovitis of joints seen. No new deformity.  Neuro: CN 2-12 intact. Alert, appropriate.  Ambulates independently.  No gross motor deficit.   No tremors.  Psych: normal mood and affect.  Skin: No rash, bruising petechiae or jaundice.    Eleonora was seen today for follow-up.  Diagnoses and all orders for this visit:  Osteopenia of neck of left femur (Primary)  Comments:  does not drink milk, has started calcium 1200mg and vitamin D 25 mcg  Type 2 diabetes mellitus with insulin therapy (Multi)  -     POCT glycosylated hemoglobin (Hb A1C) manually resulted  Primary hypertension  -     ezetimibe (Zetia) 10 mg tablet; Take 1 tablet (10 mg) by mouth once daily. DAILY  -     amLODIPine (Norvasc) 10 mg tablet; Take 1 tablet (10 mg) by mouth once daily.  History of stent insertion of renal artery  -     carvedilol (Coreg) 25 mg tablet; Take 1 tablet (25 mg) by mouth 2 times daily (morning and late afternoon).  -     hydroCHLOROthiazide (HYDRODiuril) 25 mg tablet; Take 0.5 tablets (12.5 mg) by mouth once daily. DAILY  Hyperlipidemia associated with type 2 diabetes mellitus (Multi)  -     fluvastatin (Lescol) 40 mg capsule; Take 1 capsule (40 mg) by mouth once daily at bedtime.  Immunization counseling  Degenerative disc disease, thoracic  Comments:  incidental finding on ct chest 2024  Spondylosis of lumbar region without myelopathy or radiculopathy  Essential hypertension  Stage 3b chronic kidney disease (Multi)  Incidental lung nodule, > 3mm and < 8mm  Rib contusion, right, subsequent encounter  Comments:  resolved  History of vertigo  -     meclizine (Antivert) 12.5 mg tablet; Take 1 tablet (12.5 mg) by mouth 3 times a day as needed for dizziness.     I am the Internal Medicine physician providing ongoing chronic medical care for this patient, which is managed during and in between office visits.      See patient instructions in wrap up plan, orders and comments for treatment  plan.  Patient Instructions:  Covid booster soon, can get enhanced/senior dose flu shot with it.      Move December 9 appt to Tuesday December 17 at 130 pm for her follow up and A1c.    Keep meds the same.  Keep calcium and vitamin D and exercises as we discussed.  Scribe Attestation  By signing my name below, ITeresa, Scribe   attest that this documentation has been prepared under the direction and in the presence of Jennifer Kenney MD.

## 2024-09-16 ENCOUNTER — APPOINTMENT (OUTPATIENT)
Dept: PRIMARY CARE | Facility: CLINIC | Age: 79
End: 2024-09-16
Payer: MEDICARE

## 2024-09-16 VITALS
BODY MASS INDEX: 28.86 KG/M2 | DIASTOLIC BLOOD PRESSURE: 50 MMHG | HEART RATE: 60 BPM | WEIGHT: 147 LBS | RESPIRATION RATE: 18 BRPM | SYSTOLIC BLOOD PRESSURE: 106 MMHG | HEIGHT: 60 IN

## 2024-09-16 DIAGNOSIS — E11.9 TYPE 2 DIABETES MELLITUS WITH INSULIN THERAPY (MULTI): ICD-10-CM

## 2024-09-16 DIAGNOSIS — E78.5 HYPERLIPIDEMIA ASSOCIATED WITH TYPE 2 DIABETES MELLITUS (MULTI): ICD-10-CM

## 2024-09-16 DIAGNOSIS — Z71.85 IMMUNIZATION COUNSELING: ICD-10-CM

## 2024-09-16 DIAGNOSIS — S20.211D RIB CONTUSION, RIGHT, SUBSEQUENT ENCOUNTER: ICD-10-CM

## 2024-09-16 DIAGNOSIS — N18.32 STAGE 3B CHRONIC KIDNEY DISEASE (MULTI): ICD-10-CM

## 2024-09-16 DIAGNOSIS — R91.1 INCIDENTAL LUNG NODULE, > 3MM AND < 8MM: ICD-10-CM

## 2024-09-16 DIAGNOSIS — M85.852 OSTEOPENIA OF NECK OF LEFT FEMUR: Primary | ICD-10-CM

## 2024-09-16 DIAGNOSIS — Z87.898 HISTORY OF VERTIGO: ICD-10-CM

## 2024-09-16 DIAGNOSIS — I10 PRIMARY HYPERTENSION: ICD-10-CM

## 2024-09-16 DIAGNOSIS — E11.69 HYPERLIPIDEMIA ASSOCIATED WITH TYPE 2 DIABETES MELLITUS (MULTI): ICD-10-CM

## 2024-09-16 DIAGNOSIS — Z98.890 HISTORY OF STENT INSERTION OF RENAL ARTERY: ICD-10-CM

## 2024-09-16 DIAGNOSIS — Z79.4 TYPE 2 DIABETES MELLITUS WITH INSULIN THERAPY (MULTI): ICD-10-CM

## 2024-09-16 DIAGNOSIS — M47.816 SPONDYLOSIS OF LUMBAR REGION WITHOUT MYELOPATHY OR RADICULOPATHY: ICD-10-CM

## 2024-09-16 DIAGNOSIS — I10 ESSENTIAL HYPERTENSION: ICD-10-CM

## 2024-09-16 DIAGNOSIS — M51.34 DEGENERATIVE DISC DISEASE, THORACIC: ICD-10-CM

## 2024-09-16 PROBLEM — R07.9 CHEST PAIN: Status: RESOLVED | Noted: 2023-09-04 | Resolved: 2024-09-16

## 2024-09-16 PROBLEM — G56.20 NEUROPATHY, ULNAR AT ELBOW: Status: RESOLVED | Noted: 2023-01-20 | Resolved: 2024-09-16

## 2024-09-16 PROBLEM — M79.2 NEURALGIA: Status: RESOLVED | Noted: 2023-01-20 | Resolved: 2024-09-16

## 2024-09-16 LAB — POC HEMOGLOBIN A1C: 7.5 % (ref 4.2–6.5)

## 2024-09-16 PROCEDURE — 1158F ADVNC CARE PLAN TLK DOCD: CPT | Performed by: INTERNAL MEDICINE

## 2024-09-16 PROCEDURE — 1160F RVW MEDS BY RX/DR IN RCRD: CPT | Performed by: INTERNAL MEDICINE

## 2024-09-16 PROCEDURE — 1123F ACP DISCUSS/DSCN MKR DOCD: CPT | Performed by: INTERNAL MEDICINE

## 2024-09-16 PROCEDURE — 1157F ADVNC CARE PLAN IN RCRD: CPT | Performed by: INTERNAL MEDICINE

## 2024-09-16 PROCEDURE — 1126F AMNT PAIN NOTED NONE PRSNT: CPT | Performed by: INTERNAL MEDICINE

## 2024-09-16 PROCEDURE — 1159F MED LIST DOCD IN RCRD: CPT | Performed by: INTERNAL MEDICINE

## 2024-09-16 PROCEDURE — G2211 COMPLEX E/M VISIT ADD ON: HCPCS | Performed by: INTERNAL MEDICINE

## 2024-09-16 PROCEDURE — 99214 OFFICE O/P EST MOD 30 MIN: CPT | Performed by: INTERNAL MEDICINE

## 2024-09-16 PROCEDURE — 83036 HEMOGLOBIN GLYCOSYLATED A1C: CPT | Performed by: INTERNAL MEDICINE

## 2024-09-16 PROCEDURE — 3074F SYST BP LT 130 MM HG: CPT | Performed by: INTERNAL MEDICINE

## 2024-09-16 PROCEDURE — 3078F DIAST BP <80 MM HG: CPT | Performed by: INTERNAL MEDICINE

## 2024-09-16 RX ORDER — FLUVASTATIN 40 MG/1
40 CAPSULE ORAL NIGHTLY
Qty: 90 CAPSULE | Refills: 3 | Status: SHIPPED | OUTPATIENT
Start: 2024-09-16 | End: 2025-09-16

## 2024-09-16 RX ORDER — HYDROCHLOROTHIAZIDE 25 MG/1
12.5 TABLET ORAL DAILY
Qty: 45 TABLET | Refills: 3 | Status: SHIPPED | OUTPATIENT
Start: 2024-09-16

## 2024-09-16 RX ORDER — CARVEDILOL 25 MG/1
25 TABLET ORAL
Qty: 180 TABLET | Refills: 3 | Status: SHIPPED | OUTPATIENT
Start: 2024-09-16

## 2024-09-16 RX ORDER — MECLIZINE HCL 12.5 MG 12.5 MG/1
12.5 TABLET ORAL 3 TIMES DAILY PRN
Qty: 15 TABLET | Refills: 2 | Status: SHIPPED | OUTPATIENT
Start: 2024-09-16

## 2024-09-16 RX ORDER — AMLODIPINE BESYLATE 10 MG/1
10 TABLET ORAL DAILY
Qty: 90 TABLET | Refills: 3 | Status: SHIPPED | OUTPATIENT
Start: 2024-09-16

## 2024-09-16 RX ORDER — EZETIMIBE 10 MG/1
10 TABLET ORAL DAILY
Qty: 90 TABLET | Refills: 3 | Status: SHIPPED | OUTPATIENT
Start: 2024-09-16

## 2024-09-16 ASSESSMENT — PAIN SCALES - GENERAL: PAINLEVEL: 0-NO PAIN

## 2024-09-16 NOTE — PATIENT INSTRUCTIONS
Covid booster soon, can get enhanced/senior dose flu shot with it.      Move December 9 appt to Tuesday December 17 at 130 pm for her follow up and A1c.    Keep meds the same.  Keep calcium and vitamin D and exercises as we discussed.

## 2024-09-26 ASSESSMENT — ENCOUNTER SYMPTOMS: DIABETIC ASSOCIATED SYMPTOMS: 0

## 2024-09-26 NOTE — PROGRESS NOTES
Pharmacy [facility]   Follow Up  Patient is sent at the request of Alma Pavon MD for medication management.  My final recommendations will be communicated back to the requesting provider by way of shared medical record.  Subjective   Diabetes  She presents for her follow-up diabetic visit. She has type 2 diabetes mellitus. Her disease course has been improving. There are no hypoglycemic associated symptoms. There are no diabetic associated symptoms. There are no hypoglycemic complications. Symptoms are stable. Risk factors for coronary artery disease include diabetes mellitus, dyslipidemia, hypertension and obesity. She is compliant with treatment all of the time. Her overall blood glucose range is 140-180 mg/dl. An ACE inhibitor/angiotensin II receptor blocker is not being taken. Eye exam is current (Patient just saw her optometrist for her reitonpathy and she notes they are pleased with her progress.).     Cardiovascular risk factors include advanced age (older than 55 for men, 65 for women), diabetes mellitus, dyslipidemia, hypertension, and obesity (BMI >= 30 kg/m2). The patient is not on an ACE inhibitor or angiotensin II receptor blocker.  The patient has not been previously hospitalized due to diabetic ketoacidosis.     At last visit, we titrated up her Ozempic to 1 mg weekly and titrated her Lantus down to 35 units. Patient presents today via telephone to assess glycemic control and maybe adjust insulin and/or Ozempic. Of note, patient's daughter has recently passed away so her diet has been a little off and she stopped going swimming.     Pharmacotherapy   Lantus solostar 32 units subcutaneous once daily AM  Humalog KwikPen 10-8-12 units TID AC   Ozempic 2 mg inject 2 mg under the skin once a week on Wednesdays   Jardiance 25 mg once daily    Previous Pharmacotherapy    Humulin N insulin   Metformin - d/c'ed by Dr. Shelley in 2020     Current monitoring regimen:  Patient is using continuous glucose  monitor; FreeStyle Cory 2; not connected to smartphone   9/27/24 -- 30 day average: 177 mg/dL  Time Range BG (mg/dL)   12a - 6a 171   6a -12p 178   12p - 6p 168   6p -12a 190     14 day average: 189 mg/dL    Previous BG Measurements  BG Assorted (14 day) Notes   Pre-Breakfast (7-7:30 am) 90, 140, 150     Post-Breakfast 2 hour 88, 122, 110     Swimming (11-11:30 am) N/a Drinks apple-juice before swimming if BG lower ~90   Post-Swimming/Pre-lunch (12 pm) 140, 110, 169     Post-Lunch 2 hour 135, 164, 124, 95     Pre-Dinner (5:30-6 pm) 156, 79,  140, 197     Post-Dinner 2 hour 123, 80, 138     Bedtime (10:30 pm) 138, 80, 100       BG Assorted (14 day) Notes   Pre-Breakfast (7-7:30 am) 160s     Post-Breakfast 2 hour 120s-160s     Swimming (11-11:30 am) N/A Drinks apple-juice before swimming if BG lower ~90   Post-Swimming (12 pm) 128, 121     Pre-Lunch (12 pm) 96, 110     Post-Lunch 2 hour 137, 132, 138     3-4 pm notices BG drops unknown May eat popcorn or fruit   Pre-Dinner (5:30-6 pm) 109, 150, 142, 157     Post-Dinner 2 hour 150, 198, 265     Bedtime (10:30 pm) 144, 157, 188         5/17/24 -- 14 day average: 184 mg/dL  Time Range BG (mg/dL)   6a -12p 199   12p - 6p 161   6p -12a 184       Any episodes of hypoglycemia? No    Physical Activity: Goes to a virtual exercise class 3 times a week and will go back to swimming 2 times a week next week    Adverse Effects:   Patient denies any GI adverse effects (Upset stomach/diarrhea/constipation/nausea/vomiting)  Patient denies any urinary side effects from the Jardiance     Reported Weight:  Baseline: 174 lbs   Current: 150 lbs     Adherence: no issues reported   Affordability/Accessibility: Patient has met her deductible so now her Ozempic is $30/month.    Objective   Labs:   Lab Results   Component Value Date    BILITOT 1.1 05/23/2024    CALCIUM 9.8 05/23/2024    CO2 29 05/23/2024    CL 99 05/23/2024    CREATININE 1.42 (H) 05/23/2024    GLUCOSE 108 (H) 05/23/2024     ALKPHOS 75 05/23/2024    K 3.8 05/23/2024    PROT 6.9 05/23/2024    PROT 6.9 05/23/2024     05/23/2024    AST 22 09/10/2024    ALT 25 09/10/2024    BUN 18 05/23/2024    ANIONGAP 14 05/23/2024    MG 2.67 (H) 12/16/2023    PHOS 3.8 05/23/2024    ALBUMIN 4.4 05/23/2024    GFRF 37 (A) 09/16/2023     Lab Results   Component Value Date    TRIG 168 (H) 09/10/2024    CHOL 136 09/10/2024    LDLCALC 60 09/10/2024    HDL 42.4 09/10/2024     Lab Results   Component Value Date    HGBA1C 7.5 (A) 09/16/2024    HGBA1C 8.5 (A) 06/18/2024    HGBA1C 7.7 (H) 03/18/2024       Assessment/Plan   Problem List Items Addressed This Visit          Endocrine/Metabolic    Type 2 diabetes mellitus with insulin therapy (Multi) - Primary     Patients diabetes is poorly controlled but improved with most recent A1c of 7.5% (goal < 7 %) down from 8.5% in June. Patient's SMBG have worsened in the last 2 weeks since patient has been attending her high school reunion events this past week. She has been 33% in target range but 63% above target range. Patient has been tolerating both Ozempic and Jardiance with no side effects. Since patient's last week has thrown off her readings, will not make any changes to medications and follow up in a month.     Patient has not experienced low blood sugar readings anymore. Patient does admit to losing about 25 lbs since starting Ozempic. Patient has continued to do virtual dynamic stretch exercises ([employer]) three times a week with her sister and water aerobics the other 2 days of the week.     Plan:  CONTINUE  - Lantus 32 units once daily  - Jardiance 25 mg daily (eGFR 37)  - Humalog 10 units with meals  - Ozempic 2 mg once weekly     Labs ordered: none     Follow up: 10/24/24  CINEMA Follow Up: 11/18/24    Provided contact info and encouraged patient to reach out with any concerns or questions regarding medications.        Elma Connor, PharmD    Continue all meds under the continuation of care with the  referring provider and clinical pharmacy team.

## 2024-09-27 ENCOUNTER — APPOINTMENT (OUTPATIENT)
Dept: PHARMACY | Facility: HOSPITAL | Age: 79
End: 2024-09-27
Payer: MEDICARE

## 2024-09-27 DIAGNOSIS — Z79.4 TYPE 2 DIABETES MELLITUS WITH INSULIN THERAPY (MULTI): Primary | ICD-10-CM

## 2024-09-27 DIAGNOSIS — E11.9 TYPE 2 DIABETES MELLITUS WITH INSULIN THERAPY (MULTI): Primary | ICD-10-CM

## 2024-10-02 DIAGNOSIS — E11.9 TYPE 2 DIABETES MELLITUS WITH INSULIN THERAPY (MULTI): ICD-10-CM

## 2024-10-02 DIAGNOSIS — Z79.4 TYPE 2 DIABETES MELLITUS WITH INSULIN THERAPY (MULTI): ICD-10-CM

## 2024-10-02 PROCEDURE — RXMED WILLOW AMBULATORY MEDICATION CHARGE

## 2024-10-02 RX ORDER — SEMAGLUTIDE 2.68 MG/ML
2 INJECTION, SOLUTION SUBCUTANEOUS
Qty: 3 ML | Refills: 0 | Status: SHIPPED | OUTPATIENT
Start: 2024-10-06

## 2024-10-08 ENCOUNTER — PHARMACY VISIT (OUTPATIENT)
Dept: PHARMACY | Facility: CLINIC | Age: 79
End: 2024-10-08
Payer: MEDICARE

## 2024-10-11 PROBLEM — E11.319 DIABETIC RETINOPATHY (MULTI): Status: RESOLVED | Noted: 2023-01-20 | Resolved: 2024-10-11

## 2024-10-11 PROBLEM — E66.811 OBESITY, CLASS I, BMI 30-34.9: Status: RESOLVED | Noted: 2020-07-05 | Resolved: 2024-10-11

## 2024-10-24 ENCOUNTER — APPOINTMENT (OUTPATIENT)
Dept: PHARMACY | Facility: HOSPITAL | Age: 79
End: 2024-10-24
Payer: MEDICARE

## 2024-10-24 DIAGNOSIS — E11.9 TYPE 2 DIABETES MELLITUS WITH INSULIN THERAPY (MULTI): Primary | ICD-10-CM

## 2024-10-24 DIAGNOSIS — Z79.4 TYPE 2 DIABETES MELLITUS WITH INSULIN THERAPY (MULTI): Primary | ICD-10-CM

## 2024-10-24 ASSESSMENT — ENCOUNTER SYMPTOMS: DIABETIC ASSOCIATED SYMPTOMS: 0

## 2024-10-24 NOTE — PROGRESS NOTES
Pharmacy [facility]   Follow Up  Patient is sent at the request of Alma Pavon MD for medication management.  My final recommendations will be communicated back to the requesting provider by way of shared medical record.  Subjective   Diabetes  She presents for her follow-up diabetic visit. She has type 2 diabetes mellitus. Her disease course has been improving. There are no hypoglycemic associated symptoms. There are no diabetic associated symptoms. There are no hypoglycemic complications. Symptoms are stable. Risk factors for coronary artery disease include diabetes mellitus, dyslipidemia, hypertension and obesity. She is compliant with treatment all of the time. Her overall blood glucose range is 140-180 mg/dl. An ACE inhibitor/angiotensin II receptor blocker is not being taken. Eye exam is current (Patient just saw her optometrist for her reitonpathy and she notes they are pleased with her progress.).     Cardiovascular risk factors include advanced age (older than 55 for men, 65 for women), diabetes mellitus, dyslipidemia, hypertension, and obesity (BMI >= 30 kg/m2). The patient is not on an ACE inhibitor or angiotensin II receptor blocker.  The patient has not been previously hospitalized due to diabetic ketoacidosis.       Pharmacotherapy   Lantus solostar 32 units subcutaneous once daily AM  Humalog KwikPen 10-10-10 units TID AC   Ozempic 2 mg inject 2 mg under the skin once a week on Wednesdays   Jardiance 25 mg once daily    Previous Pharmacotherapy    Humulin N insulin   Metformin - d/c'ed by Dr. Shelley in 2020     Current monitoring regimen:  Patient is using continuous glucose monitor; FreeStyle Cory 2; not connected to smartphone     10/24/24 -- 30 day average: 179 mg/dL  Time Range BG (mg/dL)   12a - 6a 175   6a -12p 181   12p - 6p 168   6p -12a 192   Denies any lows except 2 instances between the hours of 12p-6a    In target range: 66%  Above: 34%    Previous BG Measurements  9/27/24 -- 30  day average: 177 mg/dL  Time Range BG (mg/dL)   12a - 6a 171   6a -12p 178   12p - 6p 168   6p -12a 190       BG Assorted (14 day) Notes   Pre-Breakfast (7-7:30 am) 90, 140, 150     Post-Breakfast 2 hour 88, 122, 110     Swimming (11-11:30 am) N/a Drinks apple-juice before swimming if BG lower ~90   Post-Swimming/Pre-lunch (12 pm) 140, 110, 169     Post-Lunch 2 hour 135, 164, 124, 95     Pre-Dinner (5:30-6 pm) 156, 79,  140, 197     Post-Dinner 2 hour 123, 80, 138     Bedtime (10:30 pm) 138, 80, 100       BG Assorted (14 day) Notes   Pre-Breakfast (7-7:30 am) 160s     Post-Breakfast 2 hour 120s-160s     Swimming (11-11:30 am) N/A Drinks apple-juice before swimming if BG lower ~90   Post-Swimming (12 pm) 128, 121     Pre-Lunch (12 pm) 96, 110     Post-Lunch 2 hour 137, 132, 138     3-4 pm notices BG drops unknown May eat popcorn or fruit   Pre-Dinner (5:30-6 pm) 109, 150, 142, 157     Post-Dinner 2 hour 150, 198, 265     Bedtime (10:30 pm) 144, 157, 188         Any episodes of hypoglycemia? No    Physical Activity: Goes to a virtual exercise class 3 times a week and will go back to swimming 2 times a week next week    Adverse Effects:   Patient denies any GI adverse effects (Upset stomach/diarrhea/constipation/nausea/vomiting)  Patient denies any urinary side effects from the Jardiance     Reported Weight:  Baseline: 174 lbs   Current: 150 lbs     Adherence: no issues reported   Affordability/Accessibility: Patient has met her deductible so now her Ozempic is $30/month.    Objective   Labs:   Lab Results   Component Value Date    BILITOT 1.1 05/23/2024    CALCIUM 9.8 05/23/2024    CO2 29 05/23/2024    CL 99 05/23/2024    CREATININE 1.42 (H) 05/23/2024    GLUCOSE 108 (H) 05/23/2024    ALKPHOS 75 05/23/2024    K 3.8 05/23/2024    PROT 6.9 05/23/2024    PROT 6.9 05/23/2024     05/23/2024    AST 22 09/10/2024    ALT 25 09/10/2024    BUN 18 05/23/2024    ANIONGAP 14 05/23/2024    MG 2.67 (H) 12/16/2023    PHOS 3.8  05/23/2024    ALBUMIN 4.4 05/23/2024    GFRF 37 (A) 09/16/2023     Lab Results   Component Value Date    TRIG 168 (H) 09/10/2024    CHOL 136 09/10/2024    LDLCALC 60 09/10/2024    HDL 42.4 09/10/2024     Lab Results   Component Value Date    HGBA1C 7.5 (A) 09/16/2024    HGBA1C 8.5 (A) 06/18/2024    HGBA1C 7.7 (H) 03/18/2024       Assessment/Plan   Problem List Items Addressed This Visit          Endocrine/Metabolic    Type 2 diabetes mellitus with insulin therapy (Multi) - Primary    Relevant Orders    Referral to Clinical Pharmacy       Patients diabetes is poorly controlled but improved with most recent A1c of 7.5% (goal < 7 %) down from 8.5% in June. Patient's SMBG have slightly improved in the last 4 weeks. She was previously only 33% in target and today reports she has been 66% in target range. Patient has been tolerating both Ozempic and Jardiance with no side effects. Patient notes her lows occurred between 12p-6a which could be due to her smaller lunches. Encouraged to cut her Humalog down to 8 units with smaller meals to avoid going too low.     Patient does admit to losing about 25 lbs since starting Ozempic. Patient has continued to do virtual dynamic stretch exercises ([employer]) three times a week with her sister and water aerobics the other 2 days of the week.     Plan:  INCREASE  - Lantus 32 units to 34 units once daily    CONTINUE  - Jardiance 25 mg daily (eGFR 37)  - Humalog 10 units with meals (8 units for smaller meals)  - Ozempic 2 mg once weekly     Labs ordered: none     Follow up: 11/21/24  CINEMA Follow Up: 11/18/24    Provided contact info and encouraged patient to reach out with any concerns or questions regarding medications.        Elma Connor, PharmD    Continue all meds under the continuation of care with the referring provider and clinical pharmacy team.

## 2024-10-29 ENCOUNTER — OFFICE VISIT (OUTPATIENT)
Dept: CARDIOLOGY | Facility: CLINIC | Age: 79
End: 2024-10-29
Payer: MEDICARE

## 2024-10-29 VITALS
DIASTOLIC BLOOD PRESSURE: 68 MMHG | HEART RATE: 76 BPM | WEIGHT: 146.6 LBS | BODY MASS INDEX: 28.78 KG/M2 | OXYGEN SATURATION: 94 % | SYSTOLIC BLOOD PRESSURE: 118 MMHG | HEIGHT: 60 IN

## 2024-10-29 DIAGNOSIS — I70.1 RENAL ARTERY STENOSIS (CMS-HCC): ICD-10-CM

## 2024-10-29 DIAGNOSIS — Z98.890 HISTORY OF STENT INSERTION OF RENAL ARTERY: ICD-10-CM

## 2024-10-29 DIAGNOSIS — E78.5 HYPERLIPIDEMIA, UNSPECIFIED HYPERLIPIDEMIA TYPE: ICD-10-CM

## 2024-10-29 DIAGNOSIS — Z79.4 TYPE 2 DIABETES MELLITUS WITH INSULIN THERAPY (MULTI): ICD-10-CM

## 2024-10-29 DIAGNOSIS — I10 ESSENTIAL HYPERTENSION: ICD-10-CM

## 2024-10-29 DIAGNOSIS — E11.9 TYPE 2 DIABETES MELLITUS WITH INSULIN THERAPY (MULTI): ICD-10-CM

## 2024-10-29 DIAGNOSIS — I25.10 CORONARY ARTERY DISEASE INVOLVING NATIVE CORONARY ARTERY OF NATIVE HEART WITHOUT ANGINA PECTORIS: ICD-10-CM

## 2024-10-29 DIAGNOSIS — I10 PRIMARY HYPERTENSION: ICD-10-CM

## 2024-10-29 DIAGNOSIS — R93.1 AGATSTON CAC SCORE, >400: Primary | ICD-10-CM

## 2024-10-29 PROCEDURE — 93005 ELECTROCARDIOGRAM TRACING: CPT | Performed by: STUDENT IN AN ORGANIZED HEALTH CARE EDUCATION/TRAINING PROGRAM

## 2024-10-29 PROCEDURE — 99213 OFFICE O/P EST LOW 20 MIN: CPT | Mod: 25 | Performed by: STUDENT IN AN ORGANIZED HEALTH CARE EDUCATION/TRAINING PROGRAM

## 2024-10-29 PROCEDURE — 3074F SYST BP LT 130 MM HG: CPT | Performed by: STUDENT IN AN ORGANIZED HEALTH CARE EDUCATION/TRAINING PROGRAM

## 2024-10-29 PROCEDURE — 99213 OFFICE O/P EST LOW 20 MIN: CPT | Performed by: STUDENT IN AN ORGANIZED HEALTH CARE EDUCATION/TRAINING PROGRAM

## 2024-10-29 PROCEDURE — RXMED WILLOW AMBULATORY MEDICATION CHARGE

## 2024-10-29 PROCEDURE — 93010 ELECTROCARDIOGRAM REPORT: CPT | Performed by: STUDENT IN AN ORGANIZED HEALTH CARE EDUCATION/TRAINING PROGRAM

## 2024-10-29 PROCEDURE — 3078F DIAST BP <80 MM HG: CPT | Performed by: STUDENT IN AN ORGANIZED HEALTH CARE EDUCATION/TRAINING PROGRAM

## 2024-10-29 PROCEDURE — 1157F ADVNC CARE PLAN IN RCRD: CPT | Performed by: STUDENT IN AN ORGANIZED HEALTH CARE EDUCATION/TRAINING PROGRAM

## 2024-10-29 PROCEDURE — 1159F MED LIST DOCD IN RCRD: CPT | Performed by: STUDENT IN AN ORGANIZED HEALTH CARE EDUCATION/TRAINING PROGRAM

## 2024-10-29 PROCEDURE — 1126F AMNT PAIN NOTED NONE PRSNT: CPT | Performed by: STUDENT IN AN ORGANIZED HEALTH CARE EDUCATION/TRAINING PROGRAM

## 2024-10-29 RX ORDER — SEMAGLUTIDE 2.68 MG/ML
2 INJECTION, SOLUTION SUBCUTANEOUS
Qty: 9 ML | Refills: 3 | Status: SHIPPED | OUTPATIENT
Start: 2024-10-29

## 2024-10-29 RX ORDER — CARVEDILOL 25 MG/1
25 TABLET ORAL
Qty: 180 TABLET | Refills: 3 | Status: SHIPPED | OUTPATIENT
Start: 2024-10-29 | End: 2025-10-29

## 2024-10-29 RX ORDER — EZETIMIBE 10 MG/1
10 TABLET ORAL DAILY
Qty: 90 TABLET | Refills: 3 | Status: SHIPPED | OUTPATIENT
Start: 2024-10-29 | End: 2025-10-29

## 2024-10-29 ASSESSMENT — PAIN SCALES - GENERAL: PAINLEVEL_OUTOF10: 0-NO PAIN

## 2024-10-31 LAB
ATRIAL RATE: 73 BPM
P AXIS: -21 DEGREES
P OFFSET: 188 MS
P ONSET: 138 MS
PR INTERVAL: 170 MS
Q ONSET: 223 MS
QRS COUNT: 12 BEATS
QRS DURATION: 80 MS
QT INTERVAL: 378 MS
QTC CALCULATION(BAZETT): 416 MS
QTC FREDERICIA: 403 MS
R AXIS: -23 DEGREES
T AXIS: 73 DEGREES
T OFFSET: 412 MS
VENTRICULAR RATE: 73 BPM

## 2024-11-01 ENCOUNTER — PHARMACY VISIT (OUTPATIENT)
Dept: PHARMACY | Facility: CLINIC | Age: 79
End: 2024-11-01
Payer: MEDICARE

## 2024-11-04 ENCOUNTER — HOSPITAL ENCOUNTER (OUTPATIENT)
Dept: RADIOLOGY | Facility: CLINIC | Age: 79
Discharge: HOME | End: 2024-11-04
Payer: MEDICARE

## 2024-11-04 VITALS — WEIGHT: 145 LBS | BODY MASS INDEX: 28.47 KG/M2 | HEIGHT: 60 IN

## 2024-11-04 DIAGNOSIS — Z12.31 VISIT FOR SCREENING MAMMOGRAM: ICD-10-CM

## 2024-11-04 PROCEDURE — 77067 SCR MAMMO BI INCL CAD: CPT

## 2024-11-04 PROCEDURE — 77067 SCR MAMMO BI INCL CAD: CPT | Performed by: RADIOLOGY

## 2024-11-04 PROCEDURE — 77063 BREAST TOMOSYNTHESIS BI: CPT | Performed by: RADIOLOGY

## 2024-11-06 PROCEDURE — RXMED WILLOW AMBULATORY MEDICATION CHARGE

## 2024-11-11 ENCOUNTER — PHARMACY VISIT (OUTPATIENT)
Dept: PHARMACY | Facility: CLINIC | Age: 79
End: 2024-11-11
Payer: MEDICARE

## 2024-11-18 ENCOUNTER — OFFICE VISIT (OUTPATIENT)
Dept: CARDIOLOGY | Facility: CLINIC | Age: 79
End: 2024-11-18
Payer: MEDICARE

## 2024-11-18 VITALS
HEIGHT: 59 IN | DIASTOLIC BLOOD PRESSURE: 73 MMHG | SYSTOLIC BLOOD PRESSURE: 118 MMHG | WEIGHT: 148 LBS | HEART RATE: 80 BPM | BODY MASS INDEX: 29.84 KG/M2 | OXYGEN SATURATION: 95 %

## 2024-11-18 DIAGNOSIS — Z98.890 HISTORY OF STENT INSERTION OF RENAL ARTERY: Primary | ICD-10-CM

## 2024-11-18 DIAGNOSIS — E78.49 OTHER HYPERLIPIDEMIA: ICD-10-CM

## 2024-11-18 DIAGNOSIS — E11.9 TYPE 2 DIABETES MELLITUS WITH INSULIN THERAPY (MULTI): ICD-10-CM

## 2024-11-18 DIAGNOSIS — I10 ESSENTIAL HYPERTENSION: ICD-10-CM

## 2024-11-18 DIAGNOSIS — Z79.4 TYPE 2 DIABETES MELLITUS WITH INSULIN THERAPY (MULTI): ICD-10-CM

## 2024-11-18 PROCEDURE — 99214 OFFICE O/P EST MOD 30 MIN: CPT | Performed by: INTERNAL MEDICINE

## 2024-11-18 PROCEDURE — 1160F RVW MEDS BY RX/DR IN RCRD: CPT | Performed by: INTERNAL MEDICINE

## 2024-11-18 PROCEDURE — 3074F SYST BP LT 130 MM HG: CPT | Performed by: INTERNAL MEDICINE

## 2024-11-18 PROCEDURE — 1159F MED LIST DOCD IN RCRD: CPT | Performed by: INTERNAL MEDICINE

## 2024-11-18 PROCEDURE — 1126F AMNT PAIN NOTED NONE PRSNT: CPT | Performed by: INTERNAL MEDICINE

## 2024-11-18 PROCEDURE — 1036F TOBACCO NON-USER: CPT | Performed by: INTERNAL MEDICINE

## 2024-11-18 PROCEDURE — 3078F DIAST BP <80 MM HG: CPT | Performed by: INTERNAL MEDICINE

## 2024-11-18 PROCEDURE — 1157F ADVNC CARE PLAN IN RCRD: CPT | Performed by: INTERNAL MEDICINE

## 2024-11-18 ASSESSMENT — COLUMBIA-SUICIDE SEVERITY RATING SCALE - C-SSRS
6. HAVE YOU EVER DONE ANYTHING, STARTED TO DO ANYTHING, OR PREPARED TO DO ANYTHING TO END YOUR LIFE?: NO
2. HAVE YOU ACTUALLY HAD ANY THOUGHTS OF KILLING YOURSELF?: NO
1. IN THE PAST MONTH, HAVE YOU WISHED YOU WERE DEAD OR WISHED YOU COULD GO TO SLEEP AND NOT WAKE UP?: NO

## 2024-11-18 ASSESSMENT — PAIN SCALES - GENERAL: PAINLEVEL_OUTOF10: 0-NO PAIN

## 2024-11-18 NOTE — PROGRESS NOTES
Cardiology Office Note    PCP (requesting provider): Jennifer Kenney MD.    HPI:  Eleonora Varela is a 79 y.o. female with hx of DM, CKD, HTN, DLD, and L renal artery stent for renal artery stenosis (2022) who returns to Cardiology clinic for follow up visit; last seen in June 2024.     Has had multiple life stressors over the past year, with one daughter who passed away from addiction and another daughter who recently got  and she is worried about her grand kid.    She is enrolled in [facility] clinic and has been working on diet/lifestyle changes. She swims twice weekly, she has zoom meetings for yoga/movement classes. She frequently makes vegetable soups that she freezes and eats throughout the week. She has been drinking diet soda, denies sugar sweetened beverages.     Went to the ED In August due to R rib bruising she experienced after falling against her washing machine. This impaired her ability to work out for about 2 months.    She's scheduled next month for follow up of her L POONAM stent.    Denies CP, SOB, palpitations, PND, orthopnea, LE swelling.    PAST MEDICAL/SURGICAL HISTORY  Past Medical History:   Diagnosis Date    Abnormal radiologic findings on diagnostic imaging of right kidney     Abnormal ultrasound of both kidneys    Abnormal radiologic findings on diagnostic imaging of right kidney     Abnormal ultrasound of both kidneys    Acute kidney failure, unspecified (CMS-HCC) 08/22/2020    Acute-on-chronic kidney injury    BPV (benign positional vertigo) 01/20/2023    Carpal tunnel syndrome, right upper limb 01/24/2017    Right carpal tunnel syndrome    Chest pain, unspecified 03/01/2022    Right-sided chest pain    Contact with and (suspected) exposure to covid-19 08/14/2021    Exposure to COVID-19 virus    Contact with and (suspected) exposure to other bacterial communicable diseases 02/10/2022    Exposure to strep throat    Elevation of levels of liver transaminase levels 11/29/2022    Elevated  transaminase level    Encounter for gynecological examination (general) (routine) without abnormal findings 10/19/2015    Encounter for routine gynecological examination    Localized edema 08/15/2022    Lower extremity edema    Neuropathy, 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    Nonspecific lymphadenitis, unspecified 07/02/2019    Cervical lymphadenitis    Osteoarthritis of spine with radiculopathy, cervical region 08/29/2014    Other chest pain 05/10/2021    Atypical chest pain    Other conditions influencing health status 02/11/2019    Uncontrolled diabetes mellitus with kidney complication    Other long term (current) drug therapy 12/03/2020    Medication dose increased    Other microscopic hematuria 02/01/2016    Microscopic hematuria    Other specified cardiac arrhythmias 07/31/2020    Junctional rhythm    Other specified symptoms and signs involving the circulatory and respiratory systems 03/01/2022    Rales    Other symptoms and signs involving the musculoskeletal system 04/29/2014    Right hand weakness    Pain in right leg 02/05/2018    Pain in both lower extremities    Personal history of colonic polyps 10/13/2014    History of colon polyps    Personal history of diseases of the skin and subcutaneous tissue 02/13/2019    History of eczema    Personal history of other diseases of the respiratory system 03/01/2022    History of acute bronchitis    Personal history of other drug therapy     History of pneumococcal vaccination    Personal history of other drug therapy 10/20/2016    History of influenza vaccination    Personal history of other endocrine, nutritional and metabolic disease 11/29/2022    History of hyperkalemia    Personal history of other endocrine, nutritional and metabolic disease 11/29/2022    History of hyperkalemia    Pleurodynia 03/01/2022    Rib pain on right side    Strain of unspecified muscle(s) and tendon(s) at lower leg level, right leg, initial encounter 12/05/2022    Knee strain, right,  initial encounter    Toxic effect of venom of wasps, accidental (unintentional), initial encounter 08/10/2021    Stung by yellow jacket    Type 2 diabetes mellitus with diabetic cataract 03/10/2020    Cataract, diabetic       Past Surgical History:   Procedure Laterality Date    BREAST BIOPSY Right     excisional, benign    CARPAL TUNNEL RELEASE  10/13/2014    Neuroplasty Decompression Median Nerve At Carpal Tunnel    INCISIONAL BREAST BIOPSY Right     benign    OTHER SURGICAL HISTORY  03/29/2021    Colonoscopy    OTHER SURGICAL HISTORY  03/29/2021    Cardiac catheterization    OTHER SURGICAL HISTORY  11/29/2022    Renal angioplasty and stenting    OTHER SURGICAL HISTORY  05/10/2021    Cataract surgery    OTHER SURGICAL HISTORY  05/11/2022    Renal angioplasty and stenting    OTHER SURGICAL HISTORY  10/13/2014    Neuroplasty With Transposition Of Ulnar Nerve - At Elbow        PRIOR CARDIAC TESTING    EKG:   Encounter Date: 10/29/24   ECG 12 lead (Clinic Performed)   Result Value    Ventricular Rate 73    Atrial Rate 73    AK Interval 170    QRS Duration 80    QT Interval 378    QTC Calculation(Bazett) 416    P Axis -21    R Axis -23    T Axis 73    QRS Count 12    Q Onset 223    P Onset 138    P Offset 188    T Offset 412    QTC Fredericia 403    Narrative    Normal sinus rhythm  Low voltage QRS  Inferior infarct , age undetermined  Cannot rule out Anterior infarct (cited on or before 04-MAR-2024)  Abnormal ECG  When compared with ECG of 04-MAR-2024 08:34,  Inferior infarct is now Present  Confirmed by Ahsan Cook ([id number]) on 10/31/2024 9:04:01 PM     Echocardiogram: No results found for this or any previous visit from the past 1825 days.    Stress Testing:   NM CARDIAC STRESS REST (MYOCARDIAL PERFUSION MIBI) 04/13/2021    Narrative  MRN: [id number]  Patient Name: PATRIZIA ALYSON    STUDY:  CARDIAC STRESS/REST INJECTION; PART 2 STRESS OR REST (NO CHARGE);  CARDIAC STRESS/REST (MYOCARDIAL PERFUSION/MIBI);  4/13/2021  12:02 pm    INDICATION:  high risk ascad, atypical cp. has ckd-no contrast please..    COMPARISON:  None.    ACCESSION NUMBER(S):  [id number]; [id number]; [id number]    ORDERING CLINICIAN:  LOLY RANGEL    TECHNIQUE:  DIVISION OF NUCLEAR MEDICINE  STRESS MYOCARDIAL PERFUSION SCAN, ONE DAY PROTOCOL    The patient received an intravenous dose of  10.3 mCi of Tc-99m  Myoview and resting emission tomographic (SPECT) images of the  myocardium were acquired. The patient then exercised via treadmill  stress to  85 % of MPHR and achieved  7 METS. At peak stress  29.0  mCi of Tc-99m  Myoview were administered and stress phase SPECT  images of the myocardium were then acquired. These included ECG-gated  images to assess and quantify ventricular function.    FINDINGS:  Both stress and rest studies demonstrate grossly normal perfusion  throughout the left ventricle.    The left ventricle is normal in size.    Gated images demonstrate normal LV wall motion with an LV EF  estimated at greater than 65%.    Impression  1.  Normal myocardial perfusion study without evidence of ischemia or  prior infarction.  2. The left ventricle is normal in size.  3. Normal LV wall motion with an LV EF estimated at greater than 65%.    I personally reviewed the images/study and I agree with the findings  as stated. This study was interpreted at [place], [place].    Cardiac Catheterization: No results found for this or any previous visit from the past 1825 days.  No results found for this or any previous visit from the past 3650 days.     Cardiac Scoring:   CT HEART CALCIUM SCORING WO IV CONTRAST 01/29/2021    Narrative  MRN: [id number]  Patient Name: ALYSON ACHARYA    STUDY:  CT CARDIAC SCORING;  1/29/2021 8:49 am    INDICATION:  high risk ascad, atypical cp. has ckd-no contrast please..    COMPARISON:  None.    ACCESSION NUMBER(S):  [id number]    ORDERING CLINICIAN:  MATTEO KEE    TECHNIQUE:  Using  prospective ECG gating, CT scan of the coronary arteries was  performed without intravenous contrast. Coronary calcium scoring  was  performed according to the method of Agatston.    FINDINGS:  The score and distribution of calcium in the coronary arteries is as  follows:    LM 0,  .3,  LCx 0,  .7,    Total 797    The visualized ascending thoracic aorta measures 3.4 cm in diameter.  The heart is normal in size. No pericardial effusion is present.    No gross evidence of mediastinal or hilar lymphadenopathy or masses  is identified. The visualized segments of the lungs are normally  expanded.    The main pulmonary artery, right and left pulmonary artery are normal  in size.  Mild aortic valve calcifications. Mild aortic annular  calcifications.Two 4 mm noncalcified lung nodules are noted in the  left lower lobe (image 45 and 50 out of 56). No prior imaging is  available for comparison. See Fleischner criteria below for follow-up.  Mild aortic atherosclerosis.  The visualized subdiaphragmatic structures appear intact.    Impression  1. Coronary artery calcium score of 797*.  2. Mild aortic valve and aortic annular calcifications.  3. 2 subcentimeter lung nodules as above measuring 4 mm. See  Fleischner criteria below for follow-up.    *Coronary Artery  Agatston score    Score  risk  Very low 1-99  Mildly increased 100-299  Moderately increased >300  Moderate to severely increased >800    Noy et al. JCCT 2016 (http://dx.doi.org/10.1016/j.jcct.2016.11.003)    MITCHELL 10-Year CHD Risk with Coronary Artery Calcification can be  calcuate using link below  [email or web address]  Kwasi perez al. JACC 2015 (http://dx.doi.org/10.1016/j.j  acc.2015.08.035)    *Follow-up recommendations according to the Fleischner Society  Criteria (John Horvath et al., Guidelines for management of  incidental pulmonary nodules detected on CT images: From the  Fleischner Society  2017, DOI:  http://dx.doi.org/10.1148/radiol.2914473352.)  Dimensions are average of long and short axis, rounded to the nearest  millimeter. Consider all relevant risk factors.  SOLID NODULES:  SINGLE NODULE:  Low Risk:  < 6 mm No routine follow-up  6-8 mm CT at 6-12 months, then consider CT at 18-24 months  > 8 mm Consider CT at 3 months, PET/CT, or tissue sampling  Nodules <6 mm do not require routine follow-up, but certain patients  at high risk with suspicious nodule morphology, upper lobe location,  or both may warrant 12-month follow-up (recommendation 1A).  High risk:  < 6 mm Optional CT at 12 months  6-8 mm CT at 6-12 months, then CT at 18-24 months  > 8 mm Consider CT at 3 months, PET/CT, or tissue sampling  Nodules < 6 mm do not require routine follow-up, but certain patients  at high risk with suspicious nodule morphology, upper lobe location,  or both may warrant 12-month follow-up (recommendation 1A).    MULTIPLE NODULES:  Low risk:  < 6 mm No routine follow-up  6-8 mm CT at 3-6 months, then consider CT at 18-24 months  > 8 mm CT at 3-6 months, then consider CT at 18-24 months  Use most suspicious nodule as guide to management. Follow-up  intervals may vary according to size and risk (recommendation 2A).  High risk:  < 6 mm Optional CT at 12 months  6-8 mm CT at 3-6 months, then at 18-24 months  > 8 mm CT at 3-6 months, then at 18-24 months    Use most suspicious nodule as guide to management. Follow-up  intervals may vary according to size and risk (recommendation 2A).    AAA : No results found for this or any previous visit from the past 1825 days.    OTHER: No results found for this or any previous visit from the past 1825 days.        FAMILY HISTORY  Family History   Problem Relation Name Age of Onset    Prostate cancer Father      Diabetes type II Father      Breast cancer Sister Kanwal     Diabetes type II Sister Kanwal     Breast cancer Sister  62    Other (acute myocardial infraction) Brother  59         2021    Testicular cancer Brother      Other (herion addiction) Daughter      Other (alcohol dependence) Daughter      Drug abuse Daughter      Other (Large b cell lymphoma of lymph nodes on neck) Maternal Grandfather      No Known Problems Father's Brother      Other (diffuse large b cell lymphoma of lymph nodes of neck) Half-Sister         SOCIAL HISTORY  Social History     Tobacco Use    Smoking status: Never     Passive exposure: Never    Smokeless tobacco: Never   Vaping Use    Vaping status: Never Used   Substance Use Topics    Alcohol use: Not Currently    Drug use: Never       VITALS  There were no vitals filed for this visit.   There is no height or weight on file to calculate BMI.    PHYSICAL EXAM   Constitutional: alert and in no acute distress.   Eyes: no erythema, swelling or discharge from the eye .   Neck: neck is supple, symmetric, trachea midline, no masses  and no thyromegaly .   Pulmonary: no increased work of breathing or signs of respiratory distress  and lungs clear to auscultation.    Cardiovascular: carotid pulses 2+ bilaterally with no bruit , JVP was normal, no thrills , regular rhythm, normal S1 and S2, no murmurs , pedal pulses 2+ bilaterally  and no edema .   Abdomen: abdomen non-tender, no masses  and no hepatomegaly .   Skin: skin warm and dry, normal skin turgor .   Psychiatric judgment and insight is normal , oriented to person, place and time  and normal mood and affect .    LABS  Recent Labs     05/23/24  1045 12/04/23  0717 03/27/23  0820 09/16/22  0944 06/23/22  0913   WBC 7.7 6.7 6.6 5.9 5.3   HGB 16.1* 16.2* 14.4 14.4 14.8   HCT 48.8* 48.1* 43.3 42.2 43.6    208 180 196 185   MCV 89 88 90 89 89     Recent Labs     05/09/22  0619   INR 0.9     Recent Labs     09/10/24  0707 05/23/24  1045 01/12/24  1153 12/16/23  0805 12/04/23  0717 10/14/23  0806 09/16/23  0900 05/19/23  0705 05/12/21  0702 02/17/21  0829   NA  --  138 141 140 140 139 139 140   < > 137   K  --   "3.8 4.1 4.3 3.9 3.9 4.4 4.4   < > 4.0   CL  --  99 99 99 101 98 100 102   < > 101   CO2  --  29 31 30 33* 30 33* 31   < > 29   ANIONGAP  --  14 15 15 10 15 10 11   < > 11   BUN  --  18 27* 29* 28* 22 21 24*   < > 28*   CREATININE  --  1.42* 1.50* 1.59* 1.49* 1.54* 1.45* 1.29*   < > 1.46*   EGFR  --  38* 36* 33* 36* 35*  --   --   --   --    MG  --   --   --  2.67*  --  2.61*  --   --   --  2.08   ALBUMIN  --  4.4 4.8 4.5 4.2  --  4.4 4.2   < > 4.3   ALKPHOS  --  75  --  70 73  --  97 114   < >  --    ALT 25 31  --  18 21  --  27 29   < >  --    AST 22 25  --  18 22  --  22 24   < >  --    BILITOT  --  1.1  --  1.2 1.0  --  1.0 0.9   < >  --     < > = values in this interval not displayed.     Recent Labs     09/10/24  0707 05/19/23  0705 04/27/22  0701 05/12/21  0702 07/25/20  0806   TSH 2.43 1.48 2.38 2.15 1.49      No results for input(s): \"LDH\", \"CKMB\", \"TROPHS\", \"BNP\" in the last 23619 hours.    No lab exists for component: \"CK\", \"CKMBP\"  Recent Labs     09/16/24  0933 09/10/24  0707 06/18/24  0922 03/18/24  0701 12/16/23  0805 12/16/23  0805 09/16/23  0900 06/07/23  1713 05/19/23  0705 03/07/23  0916 04/27/22  0701   CHOL  --  136  --   --   --  185 150  --  132  --  129   LDLF  --   --   --   --   --   --  79  --  57  --  55   HDL  --  42.4  --   --   --  49.3 42.5  --  37.9*  --  41.8   TRIG  --  168*  --   --   --  171* 143  --  184*  --  160*   HGBA1C 7.5*  --  8.5* 7.7*   < > 6.6* 8.5*   < >  --    < >  --     < > = values in this interval not displayed.       ALLERGIES  Allergies   Allergen Reactions    Propoxyphene N-Acetaminophen Drowsiness    Adhesive Tape-Silicones Other     Steristrips - takes skin off    Cefaclor Palpitations    Latex Rash       MEDICATIONS  Current Outpatient Medications   Medication Instructions    amLODIPine (NORVASC) 10 mg, oral, Daily    aspirin 81 mg EC tablet 1 tablet, Daily    blood sugar diagnostic strip 1 strip, miscellaneous, 3 times daily, ONE TOUCH ULTRA 2 TEST STRIPS "    calcium carb/vit D3/minerals (CALCIUM 600 + MINERALS ORAL) 1,200 mg, Daily    carvedilol (COREG) 25 mg, oral, 2 times daily (morning and late afternoon)    cholecalciferol (VITAMIN D-3) 25 mcg, Daily    CRANBERRY ORAL 1 tablet, 2 times daily    cyanocobalamin, vitamin B-12, 1,000 mcg tablet, sublingual Place under the tongue. one tab sublingual daily five days per week, monday thru friday    diabetic supplies, miscellan. misc Onetouch ultrasoft lancets misc    empagliflozin (Jardiance) 25 mg TAKE 1 TABLET BY MOUTH EVERY MORNING    ezetimibe (ZETIA) 10 mg, oral, Daily, DAILY    fluvastatin (LESCOL) 40 mg, oral, Nightly    FreeStyle Cory sensor system (FreeStyle Cory 2 Sensor) kit Use as instructed    hydroCHLOROthiazide (HYDRODIURIL) 12.5 mg, oral, Daily, DAILY    insulin lispro (HUMALOG) 10 Units, subcutaneous, 3 times daily (morning, midday, late afternoon), Take as directed per insulin instructions    lancets (MICROLET LANCET MISC) Test sugar 6 times a day    Lantus Solostar U-100 Insulin 35 Units, subcutaneous, Every morning, Take as directed per insulin instructions.    loratadine (CLARITIN REDITABS) 10 mg, Daily    MAGNESIUM CITRATE ORAL 1 tablet, Nightly    meclizine (ANTIVERT) 12.5 mg, oral, 3 times daily PRN    omega-3/dha/epa/fish oil (OMEGA-3 ORAL) 1 capsule, 2 times daily    Ozempic 2 mg, subcutaneous, Once Weekly    psyllium (Metamucil) 3.4 gram packet Nightly       ASSESSMENT/PLAN  Eleonora Varela is a 79 y.o. female with a past medical history of  hx of DM, CKD, HTN, DLD, and L renal artery stent for renal artery stenosis (2022) who returns to Cardiology clinic for follow up visit; last seen in June 2024. She is enrolled in [facility] clinic and here for secondary prevention.    #Modifiable CV Risk Factor Summary  - BP: at goal 118/73 on amlodipine 10 mg daily, coreg 25 mg BID, and hydrochlorothiazide 12.5 mg daily. Noted not on ACE-I.   - LDL: at goal 60, goal <70. In September 2024. on ezetimibe 10 mg  daily, omega 3, and fluvasatin 40 mg daily. If LDL becomes elevated in the future, consider switching statin or [id number].   - Diabetes: Most recent A1c 7.5 (9/2024) from 8.5 in June. On ozempic 2 mg weekly, empagliflozin 25 mg daily, 34 units lantus daily, and meal-time insulin 10 units with meals. Has CGM. Weight in 140s from 170s. No low BS, avg 135 overall, notably avg increases from 6pm-12am to 177 as she is not active from this time.  - Smoking: non-smoker  - Obesity: body mass index is unknown because there is no height or weight on file.     #L POONAM s/p stent 2022  - On ASA 81 mg daily given prior renal stent.   - US 6/2024 with no stenosis, stent appears patent  - scheduled for repeat US next month  - follows with Dr. Cook      Follow-up in the office in 6 months    Patient was seen and examined by Attending Physician, Dr. Pavon, who agrees with this plan.     Maggie Falcon MD  Internal Medicine, PGY-2  [facility]

## 2024-11-21 ENCOUNTER — APPOINTMENT (OUTPATIENT)
Dept: PHARMACY | Facility: HOSPITAL | Age: 79
End: 2024-11-21
Payer: MEDICARE

## 2024-11-21 DIAGNOSIS — E11.9 TYPE 2 DIABETES MELLITUS WITH INSULIN THERAPY (MULTI): ICD-10-CM

## 2024-11-21 DIAGNOSIS — Z79.4 TYPE 2 DIABETES MELLITUS WITH INSULIN THERAPY (MULTI): ICD-10-CM

## 2024-11-21 RX ORDER — BLOOD SUGAR DIAGNOSTIC
STRIP MISCELLANEOUS
Qty: 100 EACH | Refills: 3 | Status: SHIPPED | OUTPATIENT
Start: 2024-11-21

## 2024-11-21 RX ORDER — DEXTROSE 4 G
TABLET,CHEWABLE ORAL
Qty: 1 EACH | Refills: 0 | Status: SHIPPED | OUTPATIENT
Start: 2024-11-21

## 2024-11-21 RX ORDER — HYDROCHLOROTHIAZIDE 12.5 MG/1
CAPSULE ORAL
Qty: 2 EACH | Refills: 11 | Status: SHIPPED | OUTPATIENT
Start: 2024-11-21

## 2024-11-21 RX ORDER — BLOOD-GLUCOSE,RECEIVER,CONT
EACH MISCELLANEOUS
Qty: 1 EACH | Refills: 0 | Status: SHIPPED | OUTPATIENT
Start: 2024-11-21

## 2024-11-21 NOTE — PROGRESS NOTES
Patient is sent at the request of Alma Pavon MD for my opinion regarding Type 2 diabetes.  My final recommendations will be communicated back to the requesting provider by way of shared medical record.    Subjective     Eleonora Varela is a 79 y.o. year old female patient with type two diabetes mellitus, chronic kidney disease, hypertension, dyslipidemia, and left renal artery stent for POONAM (2022). Patient follows with Dr. Pavon through the [facility] clinic for cardiometabolic risk factor modification. Today we are following up after her most recent [facility] visit on 11/18/2024. She remains on basal/bolus insulin therapy, Ozempic 2 mg and Jardiance 25 mg once daily. She utilizes Freestyle Cory 2 plus sensors for glucose monitoring.       Past Medical History:  She has a past medical history of Abnormal radiologic findings on diagnostic imaging of right kidney, Abnormal radiologic findings on diagnostic imaging of right kidney, Acute kidney failure, unspecified (CMS-HCC) (08/22/2020), BPV (benign positional vertigo) (01/20/2023), Carpal tunnel syndrome, right upper limb (01/24/2017), Chest pain, unspecified (03/01/2022), Contact with and (suspected) exposure to covid-19 (08/14/2021), Contact with and (suspected) exposure to other bacterial communicable diseases (02/10/2022), Elevation of levels of liver transaminase levels (11/29/2022), Encounter for gynecological examination (general) (routine) without abnormal findings (10/19/2015), Localized edema (08/15/2022), Neuropathy, 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 (01/20/2023), Nonspecific lymphadenitis, unspecified (07/02/2019), Osteoarthritis of spine with radiculopathy, cervical region (08/29/2014), Other chest pain (05/10/2021), Other conditions influencing health status (02/11/2019), Other long term (current) drug therapy (12/03/2020), Other microscopic hematuria (02/01/2016), Other specified cardiac arrhythmias (07/31/2020), Other specified symptoms and signs involving the  circulatory and respiratory systems (03/01/2022), Other symptoms and signs involving the musculoskeletal system (04/29/2014), Pain in right leg (02/05/2018), Personal history of colonic polyps (10/13/2014), Personal history of diseases of the skin and subcutaneous tissue (02/13/2019), Personal history of other diseases of the respiratory system (03/01/2022), Personal history of other drug therapy, Personal history of other drug therapy (10/20/2016), Personal history of other endocrine, nutritional and metabolic disease (11/29/2022), Personal history of other endocrine, nutritional and metabolic disease (11/29/2022), Pleurodynia (03/01/2022), Strain of unspecified muscle(s) and tendon(s) at lower leg level, right leg, initial encounter (12/05/2022), Toxic effect of venom of wasps, accidental (unintentional), initial encounter (08/10/2021), and Type 2 diabetes mellitus with diabetic cataract (03/10/2020).    Past Surgical History:  She has a past surgical history that includes Other surgical history (03/29/2021); Other surgical history (03/29/2021); Other surgical history (11/29/2022); Other surgical history (05/10/2021); Other surgical history (05/11/2022); Carpal tunnel release (10/13/2014); Other surgical history (10/13/2014); Incisional breast biopsy (Right); and Breast biopsy (Right).    Social History:  She reports that she has never smoked. She has never been exposed to tobacco smoke. She has never used smokeless tobacco. She reports that she does not currently use alcohol. She reports that she does not use drugs.    Family History:  Family History   Problem Relation Name Age of Onset    Prostate cancer Father      Diabetes type II Father      Breast cancer Sister Kanwal     Diabetes type II Sister Kanwal     Breast cancer Sister  62    Other (acute myocardial infraction) Brother  59        2021    Testicular cancer Brother      Other (herion addiction) Daughter      Other (alcohol dependence) Daughter      Drug  abuse Daughter      Other (Large b cell lymphoma of lymph nodes on neck) Maternal Grandfather      No Known Problems Father's Brother      Other (diffuse large b cell lymphoma of lymph nodes of neck) Half-Sister         Allergies:  Propoxyphene n-acetaminophen, Adhesive tape-silicones, Cefaclor, and Latex    DIABETES MELLITUS TYPE 2:      Lifestyle:   Diet:   Meals Per Day: 3  Breakfast: Variable - Cup of decaff coffee with sugar free coffee, egg. Yogurt. Infrequently drinks   Lunch: Bowl of homemade vegetable soup with a wrap or 1/2 sandwich. Today had boiled chicken with hard salami and an apple.   Dinner: Usually mimics what she has for lunch  Concentrated sweets: Daily to every other day such as eliana kisses  Drinks: Decaff coffee, infrequent juices, diet pop    Exercise:   5 days per week; 2 days are aerobics; 3 days are eccentrics  She is not active after dinner     Stress:   During our encounter today she discussed her daughter, who passed away from addiction and another daughter who recently got  and she is worried about her grand kid.    Current diabetic medications include:  Lantus solostar 100 units/mL: Inject 34 units subcutaneous once daily AM  Humalog KwikPen 100 units/mL: 10-10-10 units TID AC   Ozempic 2 mg inject 2 mg under the skin once a week on  (Injections remainin)  Jardiance 25 mg once daily     Previous Pharmacotherapy   Humulin N insulin   Metformin - d/c'ed by Dr. Shelley in     Adverse Effects: None    Glucose Readings:  Glucometer/CGM Type: Cory 2 plus  Glucometer: Onetouch Ultra 2     CGM Data    Current Sensor Wear: Yes  Sensor Placement: 11/15/2024  Sensors remaining: 3     Time in [employer]   7 Day  Above Target: 68%  In Target: 32%  Below Target: 0% 14 day  Above Target: 60%  In Target: 40%  Below Target: 0%   30 days  Above Target: 63%  In Target: 37%  Below Target: 0%   Average glucose    Sensor Usage Low Glucose Events   7 days average: 192 mg/dL  14 day  "average: 178 mg/dL  30 day average: 185 mg/dL  90 day average: 179 mg/dL  7 Days:  Scans per day: 10  Sensor data captured: 92%    14 Days:  Scans per day: 11  Sensor data captured: 97%    30 Days:  Scans per day: 11  Sensor data captured: 99% 7 Days: 0  14 Days: 0  30 Days: 0       Any episodes of hypoglycemia? Non-symptomatic, however her low glucose alarm went off during our visit at 69 mg/dL. Fingerstick glucose at 114 mg/dL.     Objective     Last Recorded Vitals:  BP Readings from Last 6 Encounters:   11/18/24 118/73   10/29/24 118/68   09/16/24 106/50   08/12/24 132/58   06/18/24 112/52   06/11/24 148/72      Wt Readings from Last 6 Encounters:   11/18/24 67.1 kg (148 lb)   11/04/24 65.8 kg (145 lb)   10/29/24 66.5 kg (146 lb 9.6 oz)   09/16/24 66.7 kg (147 lb)   08/12/24 68 kg (150 lb)   06/18/24 66.7 kg (147 lb)     BMI Readings from Last 6 Encounters:   11/18/24 29.89 kg/m²   11/04/24 28.80 kg/m²   10/29/24 29.11 kg/m²   09/16/24 29.19 kg/m²   08/12/24 29.79 kg/m²   06/18/24 28.71 kg/m²     Lab Review  Lab Results   Component Value Date    BILITOT 1.1 05/23/2024    CALCIUM 9.8 05/23/2024    CO2 29 05/23/2024    CL 99 05/23/2024    CREATININE 1.42 (H) 05/23/2024    GLUCOSE 108 (H) 05/23/2024    ALKPHOS 75 05/23/2024    K 3.8 05/23/2024    PROT 6.9 05/23/2024    PROT 6.9 05/23/2024     05/23/2024    AST 22 09/10/2024    ALT 25 09/10/2024    BUN 18 05/23/2024    ANIONGAP 14 05/23/2024    MG 2.67 (H) 12/16/2023    PHOS 3.8 05/23/2024    ALBUMIN 4.4 05/23/2024    GFRF 37 (A) 09/16/2023     Lab Results   Component Value Date    TRIG 168 (H) 09/10/2024    CHOL 136 09/10/2024    LDLCALC 60 09/10/2024    HDL 42.4 09/10/2024     Lab Results   Component Value Date    HGBA1C 7.5 (A) 09/16/2024    HGBA1C 8.5 (A) 06/18/2024    HGBA1C 7.7 (H) 03/18/2024     No components found for: \"UACR\"  The 10-year ASCVD risk score (Prince DK, et al., 2019) is: 41.3%    Values used to calculate the score:      Age: 79 years      " Sex: Female      Is Non- : No      Diabetic: Yes      Tobacco smoker: No      Systolic Blood Pressure: 118 mmHg      Is BP treated: Yes      HDL Cholesterol: 42.4 mg/dL      Total Cholesterol: 136 mg/dL    Assessment/Plan   Problem List Items Addressed This Visit       Type 2 diabetes mellitus with insulin therapy (Multi)     Patients diabetes is above goal with most recent A1c of 7.5% (Goal < 7%). Today we discussed continuation of therapy as prescribed. We reviewed in detail her different glucose reports that are available from her Freestyle Cory device. She utilizes cory 2 glucose monitoring currently. We discussed switching to Croy 3 plus which she is agreeable to. We discussed targeting a 70% time in target goal. Her 30 day glucose report indicates an average glucose of 185 mg/dL and time in target at 37%. She has not reported symptomatic hypoglycemia. She was unaware of using fingerstick glucose testing for low glucose alarms. Updated supplies were sent in for fingerstick monitoring. Will review glucose readings in 3 weeks with likely adjustment of basal/bolus therapy.         Relevant Medications    blood-glucose meter (OneTouch Ultra2 Meter) misc    blood-glucose sensor (FreeStyle Cory 3 Plus Sensor) device    FreeStyle Cory 3 [place] misc    OneTouch Ultra Test strip    Other Relevant Orders    Referral to Clinical Pharmacy     Compliance at present is estimated to be good.  Education Provided to Patient: Cory 3    Follow-up: I recommend diabetes care be 12/13/2024 at 9 am.   PCP Follow-Up: 12/17/2024  [facility] Follow-Up: Not scheduled    Continue all meds under the continuation of care with the referring provider and clinical pharmacy team.

## 2024-11-22 NOTE — ASSESSMENT & PLAN NOTE
Patients diabetes is above goal with most recent A1c of 7.5% (Goal < 7%). Today we discussed continuation of therapy as prescribed. We reviewed in detail her different glucose reports that are available from her Freestyle Cory device. She utilizes cory 2 glucose monitoring currently. We discussed switching to Cory 3 plus which she is agreeable to. We discussed targeting a 70% time in target goal. Her 30 day glucose report indicates an average glucose of 185 mg/dL and time in target at 37%. She has not reported symptomatic hypoglycemia. She was unaware of using fingerstick glucose testing for low glucose alarms. Updated supplies were sent in for fingerstick monitoring. Will review glucose readings in 3 weeks with likely adjustment of basal/bolus therapy.

## 2024-12-09 ENCOUNTER — APPOINTMENT (OUTPATIENT)
Dept: PRIMARY CARE | Facility: CLINIC | Age: 79
End: 2024-12-09
Payer: MEDICARE

## 2024-12-10 ENCOUNTER — LAB (OUTPATIENT)
Dept: LAB | Facility: LAB | Age: 79
End: 2024-12-10
Payer: MEDICARE

## 2024-12-10 ENCOUNTER — APPOINTMENT (OUTPATIENT)
Dept: CARDIOLOGY | Facility: CLINIC | Age: 79
End: 2024-12-10
Payer: MEDICARE

## 2024-12-10 ENCOUNTER — HOSPITAL ENCOUNTER (OUTPATIENT)
Dept: RADIOLOGY | Facility: CLINIC | Age: 79
End: 2024-12-10
Payer: MEDICARE

## 2024-12-11 ENCOUNTER — APPOINTMENT (OUTPATIENT)
Dept: CARDIOLOGY | Facility: CLINIC | Age: 79
End: 2024-12-11
Payer: MEDICARE

## 2024-12-13 ENCOUNTER — APPOINTMENT (OUTPATIENT)
Dept: PHARMACY | Facility: HOSPITAL | Age: 79
End: 2024-12-13
Payer: MEDICARE

## 2024-12-13 DIAGNOSIS — Z79.4 TYPE 2 DIABETES MELLITUS WITH INSULIN THERAPY (MULTI): ICD-10-CM

## 2024-12-13 DIAGNOSIS — E11.9 TYPE 2 DIABETES MELLITUS WITH INSULIN THERAPY (MULTI): ICD-10-CM
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
## 2024-12-13 NOTE — PROGRESS NOTES
Patient is sent at the request of Alma Pavon MD for my opinion regarding Type 2 diabetes.  My final recommendations will be communicated back to the requesting provider by way of shared medical record.    Subjective     Eleonora Varela is a 79 y.o. year old female patient with type two diabetes mellitus, chronic kidney disease, hypertension, dyslipidemia, and left renal artery stent for POONAM (2022). Patient follows with Dr. Pavon through the [facility] clinic for cardiometabolic risk factor modification. She remains on basal/bolus insulin therapy, Ozempic 2 mg and Jardiance 25 mg once daily. She utilizes Freestyle Cory 2 plus sensors for glucose monitoring. We discussed changing her therapy from FSL2 to FSL3 for continuous glucose monitoring. She will finish her current supply then make the change appropriately. She is connected to her reader device. During our previous telehealth visit we reviewed her freestyle cory reports and discussed her target goals with utilizing her CGM. Her average glucose for the 30 day period was 185 mg/dL and time in target at 37%.       Past Medical History:  She has a past medical history of Abnormal radiologic findings on diagnostic imaging of right kidney, Abnormal radiologic findings on diagnostic imaging of right kidney, Acute kidney failure, unspecified (CMS-HCC) (08/22/2020), BPV (benign positional vertigo) (01/20/2023), Carpal tunnel syndrome, right upper limb (01/24/2017), Chest pain, unspecified (03/01/2022), Contact with and (suspected) exposure to covid-19 (08/14/2021), Contact with and (suspected) exposure to other bacterial communicable diseases (02/10/2022), Elevation of levels of liver transaminase levels (11/29/2022), Encounter for gynecological examination (general) (routine) without abnormal findings (10/19/2015), Localized edema (08/15/2022), Neuropathy, 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 (01/20/2023), Nonspecific lymphadenitis, unspecified (07/02/2019), Osteoarthritis of spine  with radiculopathy, cervical region (08/29/2014), Other chest pain (05/10/2021), Other conditions influencing health status (02/11/2019), Other long term (current) drug therapy (12/03/2020), Other microscopic hematuria (02/01/2016), Other specified cardiac arrhythmias (07/31/2020), Other specified symptoms and signs involving the circulatory and respiratory systems (03/01/2022), Other symptoms and signs involving the musculoskeletal system (04/29/2014), Pain in right leg (02/05/2018), Personal history of colonic polyps (10/13/2014), Personal history of diseases of the skin and subcutaneous tissue (02/13/2019), Personal history of other diseases of the respiratory system (03/01/2022), Personal history of other drug therapy, Personal history of other drug therapy (10/20/2016), Personal history of other endocrine, nutritional and metabolic disease (11/29/2022), Personal history of other endocrine, nutritional and metabolic disease (11/29/2022), Pleurodynia (03/01/2022), Strain of unspecified muscle(s) and tendon(s) at lower leg level, right leg, initial encounter (12/05/2022), Toxic effect of venom of wasps, accidental (unintentional), initial encounter (08/10/2021), and Type 2 diabetes mellitus with diabetic cataract (03/10/2020).    Past Surgical History:  She has a past surgical history that includes Other surgical history (03/29/2021); Other surgical history (03/29/2021); Other surgical history (11/29/2022); Other surgical history (05/10/2021); Other surgical history (05/11/2022); Carpal tunnel release (10/13/2014); Other surgical history (10/13/2014); Incisional breast biopsy (Right); and Breast biopsy (Right).    Social History:  She reports that she has never smoked. She has never been exposed to tobacco smoke. She has never used smokeless tobacco. She reports that she does not currently use alcohol. She reports that she does not use drugs.    Family History:  Family History   Problem Relation Name Age of Onset     Prostate cancer Father      Diabetes type II Father      Breast cancer Sister Kanwal     Diabetes type II Sister Kanwal     Breast cancer Sister  62    Other (acute myocardial infraction) Brother  59            Testicular cancer Brother      Other (herion addiction) Daughter      Other (alcohol dependence) Daughter      Drug abuse Daughter      Other (Large b cell lymphoma of lymph nodes on neck) Maternal Grandfather      No Known Problems Father's Brother      Other (diffuse large b cell lymphoma of lymph nodes of neck) Half-Sister         Allergies:  Propoxyphene n-acetaminophen, Adhesive tape-silicones, Cefaclor, and Latex    DIABETES MELLITUS TYPE 2:      Lifestyle:   Diet not reviewed during today's visit     Previous Diet Discussion:   Meals Per Day: 3  Breakfast: Variable - Cup of decaff coffee with sugar free coffee, egg. Yogurt. Infrequently drinks   Lunch: Bowl of homemade vegetable soup with a wrap or 1/2 sandwich. Today had boiled chicken with hard salami and an apple.   Dinner: Usually mimics what she has for lunch  Concentrated sweets: Daily to every other day such as eliana kisses  Drinks: Decaff coffee, infrequent juices, diet pop    Exercise:   5 days per week; 2 days are aerobics; 3 days are eccentrics  She is not active after dinner     Stress:   During our previous encounter and discussion with Dr. Pavon she discussed her daughter, who passed away from addiction and another daughter who recently got  and she is worried about her grand kid.    Current diabetic medications include:  Lantus solostar 100 units/mL: Inject 34 units subcutaneous once daily AM  Humalog KwikPen 100 units/mL: 10-10-10 units TID AC   Ozempic 2 mg inject 2 mg under the skin once a week on  (Injections remainin)  Jardiance 25 mg: Take one tablet by mouth once daily     Previous Pharmacotherapy   Humulin N insulin   Metformin - d/c'ed by Dr. Shelley in     Adverse Effects: None    Glucose  Readings:  Glucometer/CGM Type: Cory 2 plus  Glucometer: Onetouch Ultra 2     CGM Data    Current Sensor Wear: Yes; FSL2 with reader  Sensors remainin    CGM Data  Time in Target   7 Day  Above Target: 64%  In Target: 36%  Below Target: 0% 14 day  Above Target: 65%  In Target: 35%  Below Target: 0%   30 days  Above Target: 64%  In Target: 36%  Below Target: 0%   Average glucose    Sensor Usage Low Glucose Events   7 days average: 193 mg/dL  14 day average: 195 mg/dL  30 day average: 191 mg/dL 7 Days:  Scans per day: 10  Sensor data captured: 90%    14 Days:  Scans per day: 11  Sensor data captured: 94%    30 Days:  Scans per day: 11  Sensor data captured: 97% 7 Days: 0  14 Days: 0  30 Days: 0         Time in Target   7 Day  Above Target: 68%  In Target: 32%  Below Target: 0% 14 day  Above Target: 60%  In Target: 40%  Below Target: 0%   30 days  Above Target: 63%  In Target: 37%  Below Target: 0%   Average glucose    Sensor Usage Low Glucose Events   7 days average: 192 mg/dL  14 day average: 178 mg/dL  30 day average: 185 mg/dL  90 day average: 179 mg/dL  7 Days:  Scans per day: 10  Sensor data captured: 92%    14 Days:  Scans per day: 11  Sensor data captured: 97%    30 Days:  Scans per day: 11  Sensor data captured: 99% 7 Days: 0  14 Days: 0  30 Days: 0       Any episodes of hypoglycemia? No    Objective     Last Recorded Vitals:  BP Readings from Last 6 Encounters:   24 118/73   10/29/24 118/68   24 106/50   24 132/58   24 112/52   24 148/72      Wt Readings from Last 6 Encounters:   24 67.1 kg (148 lb)   24 65.8 kg (145 lb)   10/29/24 66.5 kg (146 lb 9.6 oz)   24 66.7 kg (147 lb)   24 68 kg (150 lb)   24 66.7 kg (147 lb)     BMI Readings from Last 6 Encounters:   24 29.89 kg/m²   24 28.80 kg/m²   10/29/24 29.11 kg/m²   24 29.19 kg/m²   24 29.79 kg/m²   24 28.71 kg/m²     Lab Review  Lab Results   Component Value Date  "   BILITOT 1.1 05/23/2024    CALCIUM 9.8 05/23/2024    CO2 29 05/23/2024    CL 99 05/23/2024    CREATININE 1.42 (H) 05/23/2024    GLUCOSE 108 (H) 05/23/2024    ALKPHOS 75 05/23/2024    K 3.8 05/23/2024    PROT 6.9 05/23/2024    PROT 6.9 05/23/2024     05/23/2024    AST 22 09/10/2024    ALT 25 09/10/2024    BUN 18 05/23/2024    ANIONGAP 14 05/23/2024    MG 2.67 (H) 12/16/2023    PHOS 3.8 05/23/2024    ALBUMIN 4.4 05/23/2024    GFRF 37 (A) 09/16/2023     Lab Results   Component Value Date    TRIG 168 (H) 09/10/2024    CHOL 136 09/10/2024    LDLCALC 60 09/10/2024    HDL 42.4 09/10/2024     Lab Results   Component Value Date    HGBA1C 7.5 (A) 09/16/2024    HGBA1C 8.5 (A) 06/18/2024    HGBA1C 7.7 (H) 03/18/2024     No components found for: \"UACR\"  The 10-year ASCVD risk score (Prince PLASCENCIA, et al., 2019) is: 41.3%    Values used to calculate the score:      Age: 79 years      Sex: Female      Is Non- : No      Diabetic: Yes      Tobacco smoker: No      Systolic Blood Pressure: 118 mmHg      Is BP treated: Yes      HDL Cholesterol: 42.4 mg/dL      Total Cholesterol: 136 mg/dL    Assessment/Plan   Problem List Items Addressed This Visit       Type 2 diabetes mellitus with insulin therapy (Multi)     Patients diabetes is above goal with most recent A1c of 7.5% (Goal < 7%). CGM data reviewed via cory reports from her FSL2 reader. Time in target and average glucose readings have remained consistent and uncontrolled. Time in target for past 30 days at 36%, no low glucose readings and average glucose of 191 mg/dL. Advised a 20% increase in her basal insulin therapy from 34 units to 40 units once daily. Continue other therapy as prescribed. Follow up in three weeks.          Relevant Orders    Referral to Clinical Pharmacy     Compliance at present is estimated to be good.  Education Provided to Patient: Cory 3    Follow-up: I recommend diabetes care be 1/3/2025 at 9 am.   PCP Follow-Up: " 12/17/2024  [facility] Follow-Up: Not scheduled    Continue all meds under the continuation of care with the referring provider and clinical pharmacy team.

## 2024-12-17 ENCOUNTER — APPOINTMENT (OUTPATIENT)
Dept: PRIMARY CARE | Facility: CLINIC | Age: 79
End: 2024-12-17
Payer: MEDICARE

## 2024-12-17 ENCOUNTER — LAB (OUTPATIENT)
Dept: LAB | Facility: LAB | Age: 79
End: 2024-12-17
Payer: MEDICARE

## 2024-12-17 VITALS
RESPIRATION RATE: 16 BRPM | DIASTOLIC BLOOD PRESSURE: 50 MMHG | HEART RATE: 72 BPM | BODY MASS INDEX: 28.86 KG/M2 | SYSTOLIC BLOOD PRESSURE: 118 MMHG | HEIGHT: 60 IN | WEIGHT: 147 LBS

## 2024-12-17 DIAGNOSIS — Z79.4 TYPE 2 DIABETES MELLITUS WITH INSULIN THERAPY (MULTI): ICD-10-CM

## 2024-12-17 DIAGNOSIS — N18.32 STAGE 3B CHRONIC KIDNEY DISEASE (MULTI): ICD-10-CM

## 2024-12-17 DIAGNOSIS — E78.5 HYPERLIPIDEMIA, UNSPECIFIED HYPERLIPIDEMIA TYPE: ICD-10-CM

## 2024-12-17 DIAGNOSIS — R01.1 SYSTOLIC MURMUR: ICD-10-CM

## 2024-12-17 DIAGNOSIS — I10 ESSENTIAL HYPERTENSION: Primary | ICD-10-CM

## 2024-12-17 DIAGNOSIS — Z71.85 IMMUNIZATION COUNSELING: ICD-10-CM

## 2024-12-17 DIAGNOSIS — R07.9 RECURRENT CHEST PAIN: ICD-10-CM

## 2024-12-17 DIAGNOSIS — D58.2 ELEVATED HEMOGLOBIN (CMS-HCC): ICD-10-CM

## 2024-12-17 DIAGNOSIS — Z63.79 STRESS DUE TO ILLNESS OF FAMILY MEMBER: ICD-10-CM

## 2024-12-17 DIAGNOSIS — Z79.82 ASPIRIN LONG-TERM USE: ICD-10-CM

## 2024-12-17 DIAGNOSIS — I25.10 CORONARY ARTERY DISEASE INVOLVING NATIVE CORONARY ARTERY OF NATIVE HEART WITHOUT ANGINA PECTORIS: ICD-10-CM

## 2024-12-17 DIAGNOSIS — I10 ESSENTIAL HYPERTENSION: ICD-10-CM

## 2024-12-17 DIAGNOSIS — E11.9 TYPE 2 DIABETES MELLITUS WITH INSULIN THERAPY (MULTI): ICD-10-CM

## 2024-12-17 LAB — POC HEMOGLOBIN A1C: 7.5 % (ref 4.2–6.5)

## 2024-12-17 PROCEDURE — 3078F DIAST BP <80 MM HG: CPT | Performed by: INTERNAL MEDICINE

## 2024-12-17 PROCEDURE — 81003 URINALYSIS AUTO W/O SCOPE: CPT

## 2024-12-17 PROCEDURE — 36415 COLL VENOUS BLD VENIPUNCTURE: CPT

## 2024-12-17 PROCEDURE — 1123F ACP DISCUSS/DSCN MKR DOCD: CPT | Performed by: INTERNAL MEDICINE

## 2024-12-17 PROCEDURE — 80069 RENAL FUNCTION PANEL: CPT

## 2024-12-17 PROCEDURE — 83735 ASSAY OF MAGNESIUM: CPT

## 2024-12-17 PROCEDURE — 1157F ADVNC CARE PLAN IN RCRD: CPT | Performed by: INTERNAL MEDICINE

## 2024-12-17 PROCEDURE — 99215 OFFICE O/P EST HI 40 MIN: CPT | Performed by: INTERNAL MEDICINE

## 2024-12-17 PROCEDURE — 3074F SYST BP LT 130 MM HG: CPT | Performed by: INTERNAL MEDICINE

## 2024-12-17 PROCEDURE — 1158F ADVNC CARE PLAN TLK DOCD: CPT | Performed by: INTERNAL MEDICINE

## 2024-12-17 PROCEDURE — G2211 COMPLEX E/M VISIT ADD ON: HCPCS | Performed by: INTERNAL MEDICINE

## 2024-12-17 PROCEDURE — 82043 UR ALBUMIN QUANTITATIVE: CPT

## 2024-12-17 PROCEDURE — 83036 HEMOGLOBIN GLYCOSYLATED A1C: CPT | Performed by: INTERNAL MEDICINE

## 2024-12-17 PROCEDURE — 1126F AMNT PAIN NOTED NONE PRSNT: CPT | Performed by: INTERNAL MEDICINE

## 2024-12-17 PROCEDURE — 82570 ASSAY OF URINE CREATININE: CPT

## 2024-12-17 PROCEDURE — 1159F MED LIST DOCD IN RCRD: CPT | Performed by: INTERNAL MEDICINE

## 2024-12-17 PROCEDURE — 93000 ELECTROCARDIOGRAM COMPLETE: CPT | Performed by: INTERNAL MEDICINE

## 2024-12-17 PROCEDURE — 85025 COMPLETE CBC W/AUTO DIFF WBC: CPT

## 2024-12-17 ASSESSMENT — PATIENT HEALTH QUESTIONNAIRE - PHQ9
2. FEELING DOWN, DEPRESSED OR HOPELESS: NOT AT ALL
1. LITTLE INTEREST OR PLEASURE IN DOING THINGS: NOT AT ALL
SUM OF ALL RESPONSES TO PHQ9 QUESTIONS 1 AND 2: 0

## 2024-12-17 ASSESSMENT — PAIN SCALES - GENERAL: PAINLEVEL_OUTOF10: 0-NO PAIN

## 2024-12-17 NOTE — PATIENT INSTRUCTIONS
Schedule stress test, nuclear in cardiology. Also schedule echocardiogram.  Call if recurrent chest pain.    Blood and urine test soon or today. At the lab. Copies to Dr Palacios.    Follow up 3-4 months

## 2024-12-18 DIAGNOSIS — E11.21 DIABETIC NEPHROPATHY ASSOCIATED WITH TYPE 2 DIABETES MELLITUS (MULTI): Primary | ICD-10-CM

## 2024-12-18 LAB
ALBUMIN SERPL BCP-MCNC: 4.7 G/DL (ref 3.4–5)
ANION GAP SERPL CALC-SCNC: 11 MMOL/L (ref 10–20)
APPEARANCE UR: CLEAR
BASOPHILS # BLD AUTO: 0.07 X10*3/UL (ref 0–0.1)
BASOPHILS NFR BLD AUTO: 0.9 %
BILIRUB UR STRIP.AUTO-MCNC: NEGATIVE MG/DL
BUN SERPL-MCNC: 23 MG/DL (ref 6–23)
CALCIUM SERPL-MCNC: 10.3 MG/DL (ref 8.6–10.6)
CHLORIDE SERPL-SCNC: 98 MMOL/L (ref 98–107)
CO2 SERPL-SCNC: 33 MMOL/L (ref 21–32)
COLOR UR: ABNORMAL
CREAT SERPL-MCNC: 1.62 MG/DL (ref 0.5–1.05)
CREAT UR-MCNC: 40.3 MG/DL (ref 20–320)
EGFRCR SERPLBLD CKD-EPI 2021: 32 ML/MIN/1.73M*2
EOSINOPHIL # BLD AUTO: 0.17 X10*3/UL (ref 0–0.4)
EOSINOPHIL NFR BLD AUTO: 2.1 %
ERYTHROCYTE [DISTWIDTH] IN BLOOD BY AUTOMATED COUNT: 12 % (ref 11.5–14.5)
GLUCOSE SERPL-MCNC: 233 MG/DL (ref 74–99)
GLUCOSE UR STRIP.AUTO-MCNC: ABNORMAL MG/DL
HCT VFR BLD AUTO: 48.1 % (ref 36–46)
HGB BLD-MCNC: 16.2 G/DL (ref 12–16)
IMM GRANULOCYTES # BLD AUTO: 0.02 X10*3/UL (ref 0–0.5)
IMM GRANULOCYTES NFR BLD AUTO: 0.2 % (ref 0–0.9)
KETONES UR STRIP.AUTO-MCNC: NEGATIVE MG/DL
LEUKOCYTE ESTERASE UR QL STRIP.AUTO: NEGATIVE
LYMPHOCYTES # BLD AUTO: 2.65 X10*3/UL (ref 0.8–3)
LYMPHOCYTES NFR BLD AUTO: 32.3 %
MCH RBC QN AUTO: 29.5 PG (ref 26–34)
MCHC RBC AUTO-ENTMCNC: 33.7 G/DL (ref 32–36)
MCV RBC AUTO: 88 FL (ref 80–100)
MICROALBUMIN UR-MCNC: <7 MG/L
MICROALBUMIN/CREAT UR: NORMAL MG/G{CREAT}
MONOCYTES # BLD AUTO: 0.54 X10*3/UL (ref 0.05–0.8)
MONOCYTES NFR BLD AUTO: 6.6 %
NEUTROPHILS # BLD AUTO: 4.76 X10*3/UL (ref 1.6–5.5)
NEUTROPHILS NFR BLD AUTO: 57.9 %
NITRITE UR QL STRIP.AUTO: NEGATIVE
NRBC BLD-RTO: 0 /100 WBCS (ref 0–0)
PH UR STRIP.AUTO: 5.5 [PH]
PHOSPHATE SERPL-MCNC: 3.4 MG/DL (ref 2.5–4.9)
PLATELET # BLD AUTO: 221 X10*3/UL (ref 150–450)
POTASSIUM SERPL-SCNC: 4.1 MMOL/L (ref 3.5–5.3)
PROT UR STRIP.AUTO-MCNC: NEGATIVE MG/DL
RBC # BLD AUTO: 5.49 X10*6/UL (ref 4–5.2)
RBC # UR STRIP.AUTO: NEGATIVE /UL
SODIUM SERPL-SCNC: 138 MMOL/L (ref 136–145)
SP GR UR STRIP.AUTO: 1.02
UROBILINOGEN UR STRIP.AUTO-MCNC: NORMAL MG/DL
WBC # BLD AUTO: 8.2 X10*3/UL (ref 4.4–11.3)

## 2024-12-20 LAB — MAGNESIUM RBC-MCNC: 5.3 MG/DL (ref 3.6–7.5)

## 2024-12-20 NOTE — RESULT ENCOUNTER NOTE
Glucose in urine is as expected when taking empagliflozin/jardiance, and non fasting glucose is elevated. No protein in the urine. She has viewed  these results.

## 2024-12-30 ENCOUNTER — HOSPITAL ENCOUNTER (OUTPATIENT)
Dept: VASCULAR MEDICINE | Facility: CLINIC | Age: 79
Discharge: HOME | End: 2024-12-30
Payer: MEDICARE

## 2024-12-30 DIAGNOSIS — I10 PRIMARY HYPERTENSION: ICD-10-CM

## 2024-12-30 DIAGNOSIS — Z98.890 HISTORY OF STENT INSERTION OF RENAL ARTERY: ICD-10-CM

## 2024-12-30 PROCEDURE — 93975 VASCULAR STUDY: CPT | Performed by: INTERNAL MEDICINE

## 2024-12-30 PROCEDURE — 93975 VASCULAR STUDY: CPT

## 2024-12-31 ENCOUNTER — HOSPITAL ENCOUNTER (OUTPATIENT)
Dept: CARDIOLOGY | Facility: CLINIC | Age: 79
Discharge: HOME | End: 2024-12-31
Payer: MEDICARE

## 2024-12-31 ENCOUNTER — OFFICE VISIT (OUTPATIENT)
Dept: CARDIOLOGY | Facility: CLINIC | Age: 79
End: 2024-12-31
Payer: MEDICARE

## 2024-12-31 VITALS
HEART RATE: 81 BPM | DIASTOLIC BLOOD PRESSURE: 77 MMHG | WEIGHT: 148 LBS | OXYGEN SATURATION: 93 % | HEIGHT: 60 IN | SYSTOLIC BLOOD PRESSURE: 143 MMHG | BODY MASS INDEX: 29.06 KG/M2

## 2024-12-31 DIAGNOSIS — N18.32 STAGE 3B CHRONIC KIDNEY DISEASE (MULTI): ICD-10-CM

## 2024-12-31 DIAGNOSIS — R01.1 SYSTOLIC MURMUR: ICD-10-CM

## 2024-12-31 DIAGNOSIS — I70.1 ATHEROSCLEROSIS OF RENAL ARTERY (CMS-HCC): ICD-10-CM

## 2024-12-31 DIAGNOSIS — E78.5 HYPERLIPIDEMIA, UNSPECIFIED HYPERLIPIDEMIA TYPE: ICD-10-CM

## 2024-12-31 DIAGNOSIS — E11.9 TYPE 2 DIABETES MELLITUS WITH INSULIN THERAPY (MULTI): ICD-10-CM

## 2024-12-31 DIAGNOSIS — E11.21 DIABETIC NEPHROPATHY ASSOCIATED WITH TYPE 2 DIABETES MELLITUS (MULTI): ICD-10-CM

## 2024-12-31 DIAGNOSIS — Z79.4 TYPE 2 DIABETES MELLITUS WITH INSULIN THERAPY (MULTI): ICD-10-CM

## 2024-12-31 DIAGNOSIS — Q21.10 ATRIAL SEPTAL DEFECT (HHS-HCC): ICD-10-CM

## 2024-12-31 DIAGNOSIS — I25.10 CORONARY ARTERY DISEASE INVOLVING NATIVE CORONARY ARTERY OF NATIVE HEART WITHOUT ANGINA PECTORIS: ICD-10-CM

## 2024-12-31 DIAGNOSIS — I10 ESSENTIAL HYPERTENSION: Primary | ICD-10-CM

## 2024-12-31 DIAGNOSIS — Z98.890 HISTORY OF STENT INSERTION OF RENAL ARTERY: ICD-10-CM

## 2024-12-31 PROCEDURE — 3077F SYST BP >= 140 MM HG: CPT

## 2024-12-31 PROCEDURE — 99214 OFFICE O/P EST MOD 30 MIN: CPT

## 2024-12-31 PROCEDURE — 3078F DIAST BP <80 MM HG: CPT

## 2024-12-31 PROCEDURE — 1036F TOBACCO NON-USER: CPT

## 2024-12-31 PROCEDURE — 1157F ADVNC CARE PLAN IN RCRD: CPT

## 2024-12-31 PROCEDURE — 93306 TTE W/DOPPLER COMPLETE: CPT

## 2024-12-31 PROCEDURE — 93306 TTE W/DOPPLER COMPLETE: CPT | Performed by: STUDENT IN AN ORGANIZED HEALTH CARE EDUCATION/TRAINING PROGRAM

## 2024-12-31 PROCEDURE — 1159F MED LIST DOCD IN RCRD: CPT

## 2024-12-31 PROCEDURE — 1160F RVW MEDS BY RX/DR IN RCRD: CPT

## 2024-12-31 ASSESSMENT — ENCOUNTER SYMPTOMS
DEPRESSION: 0
LOSS OF SENSATION IN FEET: 0
OCCASIONAL FEELINGS OF UNSTEADINESS: 0

## 2024-12-31 NOTE — PROGRESS NOTES
"Chief Complaint:   Follow-up     History Of Present Illness:    Eleonora Varela is a 79 y.o. female with PMHx of CAD with elevated CT calcium score, HTN, HLD, renal artery stenosis s/p PCI with stent to left renal artery, atrial septal defect, T2DM, and CKD stage 3Bb, presenting today for follow up. She reports she is feeling well since last seen in our office.  Patient reports that she did see her PCP soon going to home she mention she had some chest discomfort and her PCP ordered testing which include stress test and echo.  Patient denies any chest pain today, SOB, palpitations, lightheadedness, dizziness, syncope, PND, without lower extremity edema.  She reports she is physically active and is working out at least 5 days of the week including water aerobics and yoga.    Last Recorded Vitals:  Vitals:    12/31/24 0826   BP: 143/77   BP Location: Left arm   Patient Position: Sitting   BP Cuff Size: Adult   Pulse: 81   SpO2: 93%   Weight: 67.1 kg (148 lb)   Height: 1.511 m (4' 11.5\")     Past Medical History:  She has a past medical history of Abnormal radiologic findings on diagnostic imaging of right kidney, Abnormal radiologic findings on diagnostic imaging of right kidney, Acute kidney failure, unspecified (CMS-HCC) (08/22/2020), BPV (benign positional vertigo) (01/20/2023), Carpal tunnel syndrome, right upper limb (01/24/2017), Chest pain, unspecified (03/01/2022), Contact with and (suspected) exposure to covid-19 (08/14/2021), Contact with and (suspected) exposure to other bacterial communicable diseases (02/10/2022), Elevation of levels of liver transaminase levels (11/29/2022), Encounter for gynecological examination (general) (routine) without abnormal findings (10/19/2015), Localized edema (08/15/2022), Neuropathy, 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 (01/20/2023), Nonspecific lymphadenitis, unspecified (07/02/2019), Osteoarthritis of spine with radiculopathy, cervical region (08/29/2014), Other chest pain (05/10/2021), Other " conditions influencing health status (02/11/2019), Other long term (current) drug therapy (12/03/2020), Other microscopic hematuria (02/01/2016), Other specified cardiac arrhythmias (07/31/2020), Other specified symptoms and signs involving the circulatory and respiratory systems (03/01/2022), Other symptoms and signs involving the musculoskeletal system (04/29/2014), Pain in right leg (02/05/2018), Personal history of colonic polyps (10/13/2014), Personal history of diseases of the skin and subcutaneous tissue (02/13/2019), Personal history of other diseases of the respiratory system (03/01/2022), Personal history of other drug therapy, Personal history of other drug therapy (10/20/2016), Personal history of other endocrine, nutritional and metabolic disease (11/29/2022), Personal history of other endocrine, nutritional and metabolic disease (11/29/2022), Pleurodynia (03/01/2022), Strain of unspecified muscle(s) and tendon(s) at lower leg level, right leg, initial encounter (12/05/2022), Toxic effect of venom of wasps, accidental (unintentional), initial encounter (08/10/2021), and Type 2 diabetes mellitus with diabetic cataract (03/10/2020).    Past Surgical History:  She has a past surgical history that includes Other surgical history (03/29/2021); Other surgical history (03/29/2021); Other surgical history (11/29/2022); Other surgical history (05/10/2021); Other surgical history (05/11/2022); Carpal tunnel release (10/13/2014); Other surgical history (10/13/2014); Incisional breast biopsy (Right); and Breast biopsy (Right).      Social History:  She reports that she has never smoked. She has never been exposed to tobacco smoke. She has never used smokeless tobacco. She reports that she does not currently use alcohol. She reports that she does not use drugs.    Family History:  Family History   Problem Relation Name Age of Onset    Prostate cancer Father      Diabetes type II Father      Breast cancer Sister  Kanwal     Diabetes type II Sister Kanwal     Breast cancer Sister  62    Other (acute myocardial infraction) Brother  59        2021    Testicular cancer Brother      Other (herion addiction) Daughter      Other (alcohol dependence) Daughter      Drug abuse Daughter      Other (Large b cell lymphoma of lymph nodes on neck) Maternal Grandfather      No Known Problems Father's Brother      Other (diffuse large b cell lymphoma of lymph nodes of neck) Half-Sister       Allergies:  Propoxyphene n-acetaminophen, Adhesive tape-silicones, Cefaclor, and Latex    Outpatient Medications:  Current Outpatient Medications   Medication Instructions    amLODIPine (NORVASC) 10 mg, oral, Daily    aspirin 81 mg EC tablet 1 tablet, Daily    blood-glucose meter (OneTouch Ultra2 Meter) misc Use as directed to monitor blood glucose once daily    blood-glucose sensor (FreeStyle Cory 3 Plus Sensor) device Apply 1 sensor to the back of the upper arm every 15 days. Change sensor every 15 days.    calcium carb/vit D3/minerals (CALCIUM 600 + MINERALS ORAL) 1,200 mg, Daily    carvedilol (COREG) 25 mg, oral, 2 times daily (morning and late afternoon)    cholecalciferol (VITAMIN D-3) 25 mcg, Daily    CRANBERRY ORAL 1 tablet, 2 times daily    cyanocobalamin, vitamin B-12, 1,000 mcg tablet, sublingual Place under the tongue. one tab sublingual daily five days per week, monday thru friday    diabetic supplies, miscellan. misc Onetouch ultrasoft lancets misc    empagliflozin (Jardiance) 25 mg TAKE 1 TABLET BY MOUTH EVERY MORNING    ezetimibe (ZETIA) 10 mg, oral, Daily, DAILY    fluvastatin (LESCOL) 40 mg, oral, Nightly    FreeStyle Cory 3 [place] misc Use as instructed to monitor blood glucose four times daily.    FreeStyle Cory sensor system (FreeStyle Cory 2 Sensor) kit Use as instructed    hydroCHLOROthiazide (HYDRODIURIL) 12.5 mg, oral, Daily, DAILY    insulin lispro (HUMALOG) 10 Units, subcutaneous, 3 times daily (morning, midday, late  afternoon), Take as directed per insulin instructions    lancets (MICROLET LANCET MISC) Test sugar 6 times a day    Lantus Solostar U-100 Insulin 35 Units, subcutaneous, Every morning, Take as directed per insulin instructions.    loratadine (CLARITIN REDITABS) 10 mg, Daily    MAGNESIUM CITRATE ORAL 1 tablet, Nightly    meclizine (ANTIVERT) 12.5 mg, oral, 3 times daily PRN    omega-3/dha/epa/fish oil (OMEGA-3 ORAL) 1 capsule, 2 times daily    OneTouch Ultra Test strip Use as directed once daily to test blood glucose.    Ozempic 2 mg, subcutaneous, Once Weekly    psyllium (Metamucil) 3.4 gram packet Nightly     Physical Exam:  General: no acute distress  HEENT: EOMI, no scleral icterus.  Lungs: Clear to auscultation bilaterally without wheezing, rales, or rhonchi.  Cardiovascular: Regular rhythm and rate. Normal S1 and S2. No murmurs, rubs, or gallops are appreciated. JVP normal.  Abdomen: Soft, nontender, nondistended. Bowel sounds present.  Extremities: Warm and well perfused with equal 2+ pulses bilaterally.  No edema.  Neurologic: Alert and oriented x3.      Last Labs:  CBC -  Lab Results   Component Value Date    WBC 8.2 12/17/2024    HGB 16.2 (H) 12/17/2024    HCT 48.1 (H) 12/17/2024    MCV 88 12/17/2024     12/17/2024     CMP -  Lab Results   Component Value Date    CALCIUM 10.3 12/17/2024    PHOS 3.4 12/17/2024    PROT 6.9 05/23/2024    PROT 6.9 05/23/2024    ALBUMIN 4.7 12/17/2024    AST 22 09/10/2024    ALT 25 09/10/2024    ALKPHOS 75 05/23/2024    BILITOT 1.1 05/23/2024     LIPID PANEL -   Lab Results   Component Value Date    CHOL 136 09/10/2024    TRIG 168 (H) 09/10/2024    HDL 42.4 09/10/2024    CHHDL 3.2 09/10/2024    LDLF 79 09/16/2023    VLDL 34 09/10/2024    NHDL 94 09/10/2024     RENAL FUNCTION PANEL -   Lab Results   Component Value Date    GLUCOSE 233 (H) 12/17/2024     12/17/2024    K 4.1 12/17/2024    CL 98 12/17/2024    CO2 33 (H) 12/17/2024    ANIONGAP 11 12/17/2024    BUN 23  12/17/2024    CREATININE 1.62 (H) 12/17/2024    CALCIUM 10.3 12/17/2024    PHOS 3.4 12/17/2024    ALBUMIN 4.7 12/17/2024      Lab Results   Component Value Date    HGBA1C 7.5 (A) 12/17/2024     Last Cardiology Tests:  ECG:  ECG 12 Lead   NSR    Echo:  No results found for this or any previous visit from the past 1095 days.    Cath:  No results found for this or any previous visit from the past 1095 days.    Stress Test:  Nuclear Stress Test 06/30/2023  1. Normal stress myocardial perfusion imaging.  2. Well-maintained left ventricular function.  80%    Nuclear stress test 4/13/2021  1.  Normal myocardial perfusion study without evidence of ischemia or  prior infarction.  2. The left ventricle is normal in size.  3. Normal LV wall motion with an LV EF estimated at greater than 65%.    Cardiac Imaging:  Renal duplex 6/5/2024  Right Renal Artery: Right renal arteries demonstrate no evidence of hemodynamically significant stenosis. The right renal vein is widely patent.  Left Renal Artery: Left renal arteries demonstrate no evidence of hemodynamically significant stenosis. The left renal vein is widely patent. Left renal artery stent appears patent.    Renal duplex 11/1/2023  Right Renal Artery: Right renal arteries demonstrate no evidence of hemodynamically significant stenosis. The right renal vein is widely patent.  Left Renal Artery: At the mid left renal artery there are increased velocities that are consistent with greater than 60% stenosis. The left renal vein is widely patent. Increased renal artery velocity may be due to vessel tortuousity. Renal artery stent appears patent.    Renal duplex 6/21/2023  Right Renal Artery: Right renal arteries demonstrate no evidence of hemodynamically significant stenosis. The right renal vein is widely patent.  Left Renal Artery: At the mid left renal artery there are increased velocities that are consistent with greater than 60% stenosis. The left renal vein is widely patent.  Distal renal artery demonstrates turbulent flow. Left renal artery stent appears patent.    Renal duplex 11/11/2022  Right Renal Artery: Right renal arteries demonstrate no evidence of hemodynamically significant stenosis. The right renal veins are widely patent.  Left Renal Artery: Left renal arteries demonstrate no evidence of hemodynamically significant stenosis. The left renal veins are widely patent. Left renal artery stent is noted and apears to be patent.    Renal duplex 8/12/2022  Right Renal Artery: Right renal arteries demonstrate no evidence of hemodynamically significant stenosis. The right renal vein is widely patent.  Left Renal Artery: The left renal vein is widely patent. Left renal artery and renal artery stent appears patent. A stenosis is noted in the left renal artery stent with distal turbulent flow.  Comparison:  Compared with study from 11/15/2021, no significant change.S/p left renal artery stent. Increased velocity in left renal artery stent.    Renal duplex 11/15/2021  Right Renal Artery: Right renal arteries demonstrate no evidence of hemodynamically significant stenosis. The right renal veins are widely patent.  Left Renal Artery: At the origin of the left renal artery there are increased velocities that are consistent with greater than 60% stenosis. The left renal vein is widely patent.    CT calcium score 1/20/2021  LM 0,  .3,  LCx 0,  .7,  Total 797    Carotid ultrasound 7/21/2020  Small bilateral carotid bulb atheromatous plaques without evidence  for hemodynamically significant carotid luminal stenosis    Carotid duplex 5/31/2017  Right Carotid: <50% stenosis of the right ICA. Right external carotid artery appears patent with no evidence of stenosis.   Left Carotid: <50% stenosis of the left ICA. Left external carotid artery appears patent with no evidence of stenosis.     Carotid duplex 3/18/2013  Small bilateral carotid bulb plaques without evidence for    hemodynamically significant carotid luminal stenoses bilaterally.    I have personally reviewed most recent PCP, cardiology, vascular, studies and/or documentation.      Assessment/Plan   CAD, elevated CT calcium score of 797 Agatston units with primary disease in LAD and RCA regions (1/20/2021). She did have some atypical chest discomfort in January of this year, subsequently underwent repeat a nuclear stress test this June that was essentially normal, however she had again mentioned chest discomfort to her pcp and will have a repeat stress test and Echo on 2/4/25 ordered by the PCP. I would like to see her after testing. Continue aspirin and high intensity statin therapy.      HTN, elevated, /77 today. She reports her home BP usually run in the 100s systolic. She does regularly check her BP at home.  Patient reports that today she was rushing to get here and had salty ham for dinner last night.  She is on amlodipine 10 mg daily, carvedilol 25 mg bid, and hydrochlorothiazide 12.5 mg daily. Will monitor and reassess at next visit.    HLD, 9/10/2024 LDL 60, HDL 42.4, trig 168, she is on lovastatin 40 mg daily. Goal LDL <70. Repeat labs with PCP.     T2DM, stable, A1C 7.5 (12/17/24). This is being followed by her PCP.     Renal artery stenosis, high-grade ostial left side lesion s/p PCI to the left renal artery.  Most recent renal duplex showing white repeatedly open stent.  We can continue to do surveillance with annual renal duplex. Continue aspirin and statin therapy.    CKD 3B, most recent RFP BUN 23 and Cr 1.62 (12/17/24), this is slightly above her baseline of 1.4-1.5.  This is a repeat labs that are pending.  She is being followed by nephrology for this.    Follow up with me in 3 months.     Sindhu Guerrero, APRN-CNP

## 2025-01-03 ENCOUNTER — APPOINTMENT (OUTPATIENT)
Dept: PHARMACY | Facility: HOSPITAL | Age: 80
End: 2025-01-03
Payer: MEDICARE

## 2025-01-03 DIAGNOSIS — Z79.4 TYPE 2 DIABETES MELLITUS WITH INSULIN THERAPY (MULTI): ICD-10-CM

## 2025-01-03 DIAGNOSIS — E11.9 TYPE 2 DIABETES MELLITUS WITH INSULIN THERAPY (MULTI): ICD-10-CM

## 2025-01-03 NOTE — PROGRESS NOTES
Patient is sent at the request of Alma Pavon MD for my opinion regarding Type 2 diabetes.  My final recommendations will be communicated back to the requesting provider by way of shared medical record.    Subjective     Eleonora Varela is a 79 y.o. year old female patient with type two diabetes mellitus, CKD G3b/A1, hypertension, dyslipidemia, and left renal artery stent for POONAM (2022). Patient follows with Dr. Pavon through the [facility] clinic for cardiometabolic risk factor modification. She remains on basal/bolus insulin therapy, Ozempic 2 mg and Jardiance 25 mg once daily. She utilizes Freestyle Cory 2 plus sensors for glucose monitoring. We discussed changing her therapy from FSL2 to FSL3 for continuous glucose monitoring. She will finish her current supply then make the change appropriately. She is connected to her reader device. Today we are discussing her glycemic management. POC A1c completed on 12/17/2024 consistent from previous A1c of 7.5%.       Past Medical History:  She has a past medical history of Abnormal radiologic findings on diagnostic imaging of right kidney, Abnormal radiologic findings on diagnostic imaging of right kidney, Acute kidney failure, unspecified (CMS-HCC) (08/22/2020), BPV (benign positional vertigo) (01/20/2023), Carpal tunnel syndrome, right upper limb (01/24/2017), Chest pain, unspecified (03/01/2022), Contact with and (suspected) exposure to covid-19 (08/14/2021), Contact with and (suspected) exposure to other bacterial communicable diseases (02/10/2022), Elevation of levels of liver transaminase levels (11/29/2022), Encounter for gynecological examination (general) (routine) without abnormal findings (10/19/2015), Localized edema (08/15/2022), Neuropathy, 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 (01/20/2023), Nonspecific lymphadenitis, unspecified (07/02/2019), Osteoarthritis of spine with radiculopathy, cervical region (08/29/2014), Other chest pain (05/10/2021), Other conditions influencing  health status (02/11/2019), Other long term (current) drug therapy (12/03/2020), Other microscopic hematuria (02/01/2016), Other specified cardiac arrhythmias (07/31/2020), Other specified symptoms and signs involving the circulatory and respiratory systems (03/01/2022), Other symptoms and signs involving the musculoskeletal system (04/29/2014), Pain in right leg (02/05/2018), Personal history of colonic polyps (10/13/2014), Personal history of diseases of the skin and subcutaneous tissue (02/13/2019), Personal history of other diseases of the respiratory system (03/01/2022), Personal history of other drug therapy, Personal history of other drug therapy (10/20/2016), Personal history of other endocrine, nutritional and metabolic disease (11/29/2022), Personal history of other endocrine, nutritional and metabolic disease (11/29/2022), Pleurodynia (03/01/2022), Strain of unspecified muscle(s) and tendon(s) at lower leg level, right leg, initial encounter (12/05/2022), Toxic effect of venom of wasps, accidental (unintentional), initial encounter (08/10/2021), and Type 2 diabetes mellitus with diabetic cataract (03/10/2020).    Past Surgical History:  She has a past surgical history that includes Other surgical history (03/29/2021); Other surgical history (03/29/2021); Other surgical history (11/29/2022); Other surgical history (05/10/2021); Other surgical history (05/11/2022); Carpal tunnel release (10/13/2014); Other surgical history (10/13/2014); Incisional breast biopsy (Right); and Breast biopsy (Right).    Social History:  She reports that she has never smoked. She has never been exposed to tobacco smoke. She has never used smokeless tobacco. She reports that she does not currently use alcohol. She reports that she does not use drugs.    Family History:  Family History   Problem Relation Name Age of Onset    Prostate cancer Father      Diabetes type II Father      Breast cancer Sister Kanwal     Diabetes type II  Sister Kanwal     Breast cancer Sister  62    Other (acute myocardial infraction) Brother  59            Testicular cancer Brother      Other (herion addiction) Daughter      Other (alcohol dependence) Daughter      Drug abuse Daughter      Other (Large b cell lymphoma of lymph nodes on neck) Maternal Grandfather      No Known Problems Father's Brother      Other (diffuse large b cell lymphoma of lymph nodes of neck) Half-Sister       Allergies:  Propoxyphene n-acetaminophen, Adhesive tape-silicones, Cefaclor, and Latex    DIABETES MELLITUS TYPE 2:      Exercise:   5 days per week; 2 days are aerobics; 3 days are eccentrics  She is not active after dinner     Stress:   During our previous encounter and discussion with Dr. Pavon she discussed her daughter, who passed away from addiction and another daughter who recently got  and she is worried about her grand kid.    Current diabetic medications include:  Lantus solostar 100 units/mL: Inject 36 units subcutaneous once daily AM (8 pens remaining)  Humalog KwikPen 100 units/mL: 10-10-10 units TID AC   Ozempic 2 mg inject 2 mg under the skin once a week on  (Injections remainin)  Jardiance 25 mg: Take one tablet by mouth once daily     Previous Pharmacotherapy   Humulin N insulin   Metformin - d/c'ed by Dr. Shelley in     Adverse Effects: None    Glucose Readings:  Glucometer/CGM Type: Cory 2 plus  Glucometer: Onetouch Ultra 2     CGM Data    Current Sensor Wear: Yes; FSL2 with reader      CGM Data  Time in Target   7 Day  Above Target: 61%  In Target: 39%  Below Target: 0% 14 day  Above Target: 57%  In Target: 43%  Below Target: 0%   30 days  Above Target: 53%  In Target: 47%  Below Target: 0%   Average glucose    Sensor Usage Low Glucose Events   7 days average: 176 mg/dL  14 day average: 177 mg/dL  30 day average: 173 mg/dL Not discussed - No reported missed  7 Days: 0  14 Days: 0  30 Days: 0       CGM Data  Time in Target   7 Day  Above  Target: 64%  In Target: 36%  Below Target: 0% 14 day  Above Target: 65%  In Target: 35%  Below Target: 0%   30 days  Above Target: 64%  In Target: 36%  Below Target: 0%   Average glucose    Sensor Usage Low Glucose Events   7 days average: 193 mg/dL  14 day average: 195 mg/dL  30 day average: 191 mg/dL 7 Days:  Scans per day: 10  Sensor data captured: 90%    14 Days:  Scans per day: 11  Sensor data captured: 94%    30 Days:  Scans per day: 11  Sensor data captured: 97% 7 Days: 0  14 Days: 0  30 Days: 0       Any episodes of hypoglycemia? No  Any episodes of symptomatic hypoglycemia? No    Time in Target   7 Day  Above Target: 68%  In Target: 32%  Below Target: 0% 14 day  Above Target: 60%  In Target: 40%  Below Target: 0%   30 days  Above Target: 63%  In Target: 37%  Below Target: 0%   Average glucose    Sensor Usage Low Glucose Events   7 days average: 192 mg/dL  14 day average: 178 mg/dL  30 day average: 185 mg/dL  90 day average: 179 mg/dL  7 Days:  Scans per day: 10  Sensor data captured: 92%    14 Days:  Scans per day: 11  Sensor data captured: 97%    30 Days:  Scans per day: 11  Sensor data captured: 99% 7 Days: 0  14 Days: 0  30 Days: 0       Affordability/Accessibility:   Lantus Solostar not covered in 2025 - Therapy required to be switched to alternative (I.e. Toujeo max solostar, Basaglar kwikpen, Semglee)    Objective     Last Recorded Vitals:  BP Readings from Last 6 Encounters:   12/31/24 143/77   12/17/24 118/50   11/18/24 118/73   10/29/24 118/68   09/16/24 106/50   08/12/24 132/58      Wt Readings from Last 6 Encounters:   12/31/24 67.1 kg (148 lb)   12/17/24 66.7 kg (147 lb)   11/18/24 67.1 kg (148 lb)   11/04/24 65.8 kg (145 lb)   10/29/24 66.5 kg (146 lb 9.6 oz)   09/16/24 66.7 kg (147 lb)     BMI Readings from Last 6 Encounters:   12/31/24 29.39 kg/m²   12/17/24 29.19 kg/m²   11/18/24 29.89 kg/m²   11/04/24 28.80 kg/m²   10/29/24 29.11 kg/m²   09/16/24 29.19 kg/m²     Lab Review  Lab Results    Component Value Date    BILITOT 1.1 05/23/2024    CALCIUM 10.3 12/17/2024    CO2 33 (H) 12/17/2024    CL 98 12/17/2024    CREATININE 1.62 (H) 12/17/2024    GLUCOSE 233 (H) 12/17/2024    ALKPHOS 75 05/23/2024    K 4.1 12/17/2024    PROT 6.9 05/23/2024    PROT 6.9 05/23/2024     12/17/2024    AST 22 09/10/2024    ALT 25 09/10/2024    BUN 23 12/17/2024    ANIONGAP 11 12/17/2024    MG 2.67 (H) 12/16/2023    PHOS 3.4 12/17/2024    ALBUMIN 4.7 12/17/2024    GFRF 37 (A) 09/16/2023     Lab Results   Component Value Date    TRIG 168 (H) 09/10/2024    CHOL 136 09/10/2024    LDLCALC 60 09/10/2024    HDL 42.4 09/10/2024     Lab Results   Component Value Date    HGBA1C 7.5 (A) 12/17/2024    HGBA1C 7.5 (A) 09/16/2024    HGBA1C 8.5 (A) 06/18/2024     Component      Latest Ref Rng 9/16/2023 12/16/2023 1/12/2024 12/17/2024   ALBUMIN (MG/L) IN URINE      Not Established mg/L 119.4       Albumin/Creatinine Ratio 82.9 (H)  13.6  40.7 (H)  --    Creatinine, Urine Random      20.0 - 320.0 mg/dL 144.0  95.7  86.8  40.3    Albumin, Urine Random      Not established mg/L  13.0  35.3  <7.0       Legend:  (H) High    The 10-year ASCVD risk score (Prince DK, et al., 2019) is: 54.3%    Values used to calculate the score:      Age: 79 years      Sex: Female      Is Non- : No      Diabetic: Yes      Tobacco smoker: No      Systolic Blood Pressure: 143 mmHg      Is BP treated: Yes      HDL Cholesterol: 42.4 mg/dL      Total Cholesterol: 136 mg/dL    Assessment/Plan   Problem List Items Addressed This Visit       Type 2 diabetes mellitus with insulin therapy (Multi)     Patients diabetes is above goal with most recent A1c of 7.5% persistent from 9/16/2024 at 7.5% (Goal < 7%). CGM data reviewed via poly reports from her FSL2 reader. Time in target consistent from previous follow-up, however average glucose readings have improved ~18-20 mg/dL. No reported symptomatic hypoglycemia during review period. Advised to  continue with 36 units of insulin glargine at this time with a 2 unit increase with lunch and dinner (10-12-12). Advised to reduce to 8 units with breakfast on days where she will be swimming. Continue other therapy as prescribed. Follow up in three weeks.          Relevant Orders    Referral to Clinical Pharmacy     Compliance at present is estimated to be good.  Follow-up: I recommend diabetes care be 01/24/2025 at 9 am.   PCP Follow-Up: 12/17/2024  [facility] Follow-Up: Not scheduled    Continue all meds under the continuation of care with the referring provider and clinical pharmacy team.

## 2025-01-03 NOTE — ASSESSMENT & PLAN NOTE
Patients diabetes is above goal with most recent A1c of 7.5% persistent from 9/16/2024 at 7.5% (Goal < 7%). CGM data reviewed via poly reports from her FSL2 reader. Time in target consistent from previous follow-up, however average glucose readings have improved ~18-20 mg/dL. No reported symptomatic hypoglycemia during review period. Advised to continue with 36 units of insulin glargine at this time with a 2 unit increase with lunch and dinner (10-12-12). Advised to reduce to 8 units with breakfast on days where she will be swimming. Continue other therapy as prescribed. Follow up in three weeks.

## 2025-01-05 LAB
AORTIC VALVE PEAK VELOCITY: 1.37 M/S
AV PEAK GRADIENT: 7 MMHG
AVA (PEAK VEL): 2.09 CM2
EJECTION FRACTION APICAL 4 CHAMBER: 68.8
EJECTION FRACTION: 67 %
LEFT ATRIUM VOLUME AREA LENGTH INDEX BSA: 17.1 ML/M2
LEFT VENTRICLE INTERNAL DIMENSION DIASTOLE: 3.07 CM (ref 3.5–6)
LEFT VENTRICULAR OUTFLOW TRACT DIAMETER: 2.04 CM
MITRAL VALVE E/A RATIO: 0.62
RIGHT VENTRICLE FREE WALL PEAK S': 1 CM/S
TRICUSPID ANNULAR PLANE SYSTOLIC EXCURSION: 1.6 CM

## 2025-01-10 ENCOUNTER — LAB (OUTPATIENT)
Dept: LAB | Facility: LAB | Age: 80
End: 2025-01-10
Payer: MEDICARE

## 2025-01-10 DIAGNOSIS — E11.21 DIABETIC NEPHROPATHY ASSOCIATED WITH TYPE 2 DIABETES MELLITUS (MULTI): ICD-10-CM

## 2025-01-10 DIAGNOSIS — N25.81 SECONDARY HYPERPARATHYROIDISM OF RENAL ORIGIN (MULTI): ICD-10-CM

## 2025-01-10 DIAGNOSIS — N18.30 CHRONIC KIDNEY DISEASE, STAGE 3 UNSPECIFIED (MULTI): Primary | ICD-10-CM

## 2025-01-10 LAB
25(OH)D3 SERPL-MCNC: 28 NG/ML (ref 30–100)
ALBUMIN SERPL BCP-MCNC: 4.5 G/DL (ref 3.4–5)
ANION GAP SERPL CALC-SCNC: 13 MMOL/L (ref 10–20)
BUN SERPL-MCNC: 23 MG/DL (ref 6–23)
CALCIUM SERPL-MCNC: 9.9 MG/DL (ref 8.6–10.6)
CHLORIDE SERPL-SCNC: 101 MMOL/L (ref 98–107)
CO2 SERPL-SCNC: 31 MMOL/L (ref 21–32)
CREAT SERPL-MCNC: 1.46 MG/DL (ref 0.5–1.05)
EGFRCR SERPLBLD CKD-EPI 2021: 37 ML/MIN/1.73M*2
GLUCOSE SERPL-MCNC: 177 MG/DL (ref 74–99)
PHOSPHATE SERPL-MCNC: 4 MG/DL (ref 2.5–4.9)
POTASSIUM SERPL-SCNC: 4 MMOL/L (ref 3.5–5.3)
PROT UR-ACNC: 9 MG/DL (ref 5–25)
PTH-INTACT SERPL-MCNC: 85.4 PG/ML (ref 18.5–88)
SODIUM SERPL-SCNC: 141 MMOL/L (ref 136–145)

## 2025-01-10 PROCEDURE — 84156 ASSAY OF PROTEIN URINE: CPT

## 2025-01-10 PROCEDURE — 83970 ASSAY OF PARATHORMONE: CPT

## 2025-01-10 PROCEDURE — 82306 VITAMIN D 25 HYDROXY: CPT

## 2025-01-10 PROCEDURE — 84166 PROTEIN E-PHORESIS/URINE/CSF: CPT

## 2025-01-10 PROCEDURE — 80069 RENAL FUNCTION PANEL: CPT

## 2025-01-10 PROCEDURE — 86335 IMMUNFIX E-PHORSIS/URINE/CSF: CPT

## 2025-01-14 LAB
ALBUMIN MFR UR ELPH: 31.9 %
ALPHA1 GLOB MFR UR ELPH: 11.3 %
ALPHA2 GLOB MFR UR ELPH: 12.2 %
B-GLOBULIN MFR UR ELPH: 18.4 %
GAMMA GLOB MFR UR ELPH: 26.2 %
IMMUNOFIXATION COMMENT: NORMAL
PATH REVIEW - URINE IMMUNOFIXATION: NORMAL
PATH REVIEW-URINE PROTEIN ELECTROPHORESIS: NORMAL
URINE ELECTROPHORESIS COMMENT: NORMAL

## 2025-01-21 PROCEDURE — RXMED WILLOW AMBULATORY MEDICATION CHARGE

## 2025-01-22 ENCOUNTER — PHARMACY VISIT (OUTPATIENT)
Dept: PHARMACY | Facility: CLINIC | Age: 80
End: 2025-01-22
Payer: MEDICARE

## 2025-01-24 ENCOUNTER — APPOINTMENT (OUTPATIENT)
Dept: PHARMACY | Facility: HOSPITAL | Age: 80
End: 2025-01-24
Payer: MEDICARE

## 2025-01-24 DIAGNOSIS — Z79.4 TYPE 2 DIABETES MELLITUS WITH INSULIN THERAPY (MULTI): Primary | ICD-10-CM

## 2025-01-24 DIAGNOSIS — E11.9 TYPE 2 DIABETES MELLITUS WITH INSULIN THERAPY (MULTI): Primary | ICD-10-CM

## 2025-01-24 NOTE — ASSESSMENT & PLAN NOTE
Patients diabetes is above goal with most recent A1c of 7.5% persistent from 9/16/2024 at 7.5% (Goal < 7%). CGM data reviewed via poly reports from her FSL2 reader. Time in target worsened at 27% for the 30 day reporting period and 73% above target. Her average glucose for the day reporting period is 211 mg/dL. No reported symptomatic hypoglycemia during review period. Advised to continue increase to  38 units of insulin glargine at this time with a 2 unit increase with lunch and dinner (10-12-12). The meal-time insulin was advised to change at our last visit. Advised to reduce to 8 units with breakfast on days where she will be swimming. Continue other therapy as prescribed. Follow up in two weeks.

## 2025-01-24 NOTE — PROGRESS NOTES
Patient is sent at the request of Alma Pavon MD for my opinion regarding Type 2 diabetes.  My final recommendations will be communicated back to the requesting provider by way of shared medical record.    Subjective     Eleonora Varela is a 79 y.o. year old female patient with type two diabetes mellitus, CKD G3b/A1, hypertension, dyslipidemia, and left renal artery stent for POONAM (2022). Patient follows with Dr. Pavon through the [facility] clinic for cardiometabolic risk factor modification. She remains on basal/bolus insulin therapy, Ozempic 2 mg and Jardiance 25 mg once daily. She utilizes Freestyle Cory 2 plus sensors for glucose monitoring. We discussed changing her therapy from FSL2 to FSL3 for continuous glucose monitoring. She is connected to her reader device. During our previous visit I advised continuation of her basal therapy as prescribed with a 2 unit increase with lunch and dinner dosing. Today we are discussing her glycemic control with these changes.      Past Medical History:  She has a past medical history of Abnormal radiologic findings on diagnostic imaging of right kidney, Abnormal radiologic findings on diagnostic imaging of right kidney, Acute kidney failure, unspecified (CMS-HCC) (08/22/2020), BPV (benign positional vertigo) (01/20/2023), Carpal tunnel syndrome, right upper limb (01/24/2017), Chest pain, unspecified (03/01/2022), Contact with and (suspected) exposure to covid-19 (08/14/2021), Contact with and (suspected) exposure to other bacterial communicable diseases (02/10/2022), Elevation of levels of liver transaminase levels (11/29/2022), Encounter for gynecological examination (general) (routine) without abnormal findings (10/19/2015), Localized edema (08/15/2022), Neuropathy, 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 (01/20/2023), Nonspecific lymphadenitis, unspecified (07/02/2019), Osteoarthritis of spine with radiculopathy, cervical region (08/29/2014), Other chest pain (05/10/2021), Other conditions  influencing health status (02/11/2019), Other long term (current) drug therapy (12/03/2020), Other microscopic hematuria (02/01/2016), Other specified cardiac arrhythmias (07/31/2020), Other specified symptoms and signs involving the circulatory and respiratory systems (03/01/2022), Other symptoms and signs involving the musculoskeletal system (04/29/2014), Pain in right leg (02/05/2018), Personal history of colonic polyps (10/13/2014), Personal history of diseases of the skin and subcutaneous tissue (02/13/2019), Personal history of other diseases of the respiratory system (03/01/2022), Personal history of other drug therapy, Personal history of other drug therapy (10/20/2016), Personal history of other endocrine, nutritional and metabolic disease (11/29/2022), Personal history of other endocrine, nutritional and metabolic disease (11/29/2022), Pleurodynia (03/01/2022), Strain of unspecified muscle(s) and tendon(s) at lower leg level, right leg, initial encounter (12/05/2022), Toxic effect of venom of wasps, accidental (unintentional), initial encounter (08/10/2021), and Type 2 diabetes mellitus with diabetic cataract (03/10/2020).    Past Surgical History:  She has a past surgical history that includes Other surgical history (03/29/2021); Other surgical history (03/29/2021); Other surgical history (11/29/2022); Other surgical history (05/10/2021); Other surgical history (05/11/2022); Carpal tunnel release (10/13/2014); Other surgical history (10/13/2014); Incisional breast biopsy (Right); and Breast biopsy (Right).    Social History:  She reports that she has never smoked. She has never been exposed to tobacco smoke. She has never used smokeless tobacco. She reports that she does not currently use alcohol. She reports that she does not use drugs.    Family History:  Family History   Problem Relation Name Age of Onset    Prostate cancer Father      Diabetes type II Father      Breast cancer Sister Kanwal      Diabetes type II Sister Kanwal     Breast cancer Sister  62    Other (acute myocardial infraction) Brother  59        2021    Testicular cancer Brother      Other (herion addiction) Daughter      Other (alcohol dependence) Daughter      Drug abuse Daughter      Other (Large b cell lymphoma of lymph nodes on neck) Maternal Grandfather      No Known Problems Father's Brother      Other (diffuse large b cell lymphoma of lymph nodes of neck) Half-Sister       Allergies:  Propoxyphene n-acetaminophen, Adhesive tape-silicones, Cefaclor, and Latex    DIABETES MELLITUS TYPE 2:      Diet:   No reported changes regarding diet in the last two weeks    24 Hour Dietary Recall:   Meal 1: Soft boiled egg and 1/2 piece of cranberry orange bread from [employer]. Decaffeinated coffee.  Meal 2: Homemade pierogi x 1 with chicken  Meal 3: 1/2 sub from [employer] - Ham and Turkey   Fluids: Water, decaffeinated tea - SF, diet Dr. Pepper    Exercise:   She did not go to the swimming pool this week due to eye injections received  5 days per week; 2 days are aerobics; 3 days are eccentrics  She is not active after dinner     Stress:   During our previous encounter and discussion with Dr. Pavon she discussed her daughter, who passed away from addiction and another daughter who recently got  and she is worried about her grand kid.    Current diabetic medications include:  Lantus solostar 100 units/mL: Inject 36 units subcutaneous once daily AM   Humalog KwikPen 100 units/mL: 10-12-12 units TID AC --> 10-10-10 since last visit. Not changed  Ozempic 2 mg inject 2 mg under the skin once a week on Wednesdays  Jardiance 25 mg: Take one tablet by mouth once daily     Previous Pharmacotherapy   Humulin N insulin   Metformin - d/c'ed by Dr. Shelley in 2020    Adverse Effects: None    Glucose Readings:  Glucometer/CGM Type: Cory 2 plus  Glucometer: Onetouch Ultra 2     CGM Data    Current Sensor Wear: Yes; FSL2 with reader  Current Sensor days  remainin  Sensors remaining for FSL2: 5 boxes     CGM Data - 2025    Current Glucose: 295 mg/dL - Ate approximately 30 minutes     Time in Target   7 Day  Above Target: 85%  In Target: 15%  Below Target: 0% 14 day  Above Target: 83%  In Target: 17%  Below Target: 0%   30 days  Above Target: 73%  In Target: 27%  Below Target: 0%   Average glucose    Sensor Usage Low Glucose Events   7 days average: 238 mg/dL    12 am to 6 am 241 mg/dL  6 am to 12 pm 238 mg/L  12 to 6 pm 213 mg/dL  6 pm to 12 am 261 mg/dL    14 day average: 233 mg/dL    12 am to 6 am 233 mg/dL  6 am to 12 pm 225 mg/L  12 to 6 pm 217 mg/dL  6 pm to 12 am 259 mg/dL    30 day average: 211 mg/dL    12 am to 6 am 217 mg/dL  6 am to 12 pm 207 mg/L  12 to 6 pm 187 mg/dL  6 pm to 12 am 235 mg/dL Not discussed - No reported missed  7 Days: 0  14 Days: 0  30 Days: 0       CGM Data - 1/3/2025  Time in Target   7 Day  Above Target: 61%  In Target: 39%  Below Target: 0% 14 day  Above Target: 57%  In Target: 43%  Below Target: 0%   30 days  Above Target: 53%  In Target: 47%  Below Target: 0%   Average glucose    Sensor Usage Low Glucose Events   7 days average: 176 mg/dL  14 day average: 177 mg/dL  30 day average: 173 mg/dL Not discussed - No reported missed  7 Days: 0  14 Days: 0  30 Days: 0       CGM Data  Time in Target   7 Day  Above Target: 64%  In Target: 36%  Below Target: 0% 14 day  Above Target: 65%  In Target: 35%  Below Target: 0%   30 days  Above Target: 64%  In Target: 36%  Below Target: 0%   Average glucose    Sensor Usage Low Glucose Events   7 days average: 193 mg/dL  14 day average: 195 mg/dL  30 day average: 191 mg/dL 7 Days:  Scans per day: 10  Sensor data captured: 90%    14 Days:  Scans per day: 11  Sensor data captured: 94%    30 Days:  Scans per day: 11  Sensor data captured: 97% 7 Days: 0  14 Days: 0  30 Days: 0       Any episodes of hypoglycemia? No  Any episodes of symptomatic hypoglycemia? No    Time in Target   7 Day  Above  Target: 68%  In Target: 32%  Below Target: 0% 14 day  Above Target: 60%  In Target: 40%  Below Target: 0%   30 days  Above Target: 63%  In Target: 37%  Below Target: 0%   Average glucose    Sensor Usage Low Glucose Events   7 days average: 192 mg/dL  14 day average: 178 mg/dL  30 day average: 185 mg/dL  90 day average: 179 mg/dL  7 Days:  Scans per day: 10  Sensor data captured: 92%    14 Days:  Scans per day: 11  Sensor data captured: 97%    30 Days:  Scans per day: 11  Sensor data captured: 99% 7 Days: 0  14 Days: 0  30 Days: 0       Affordability/Accessibility:   Lantus Solostar not covered in 2025 - Therapy required to be switched to alternative (I.e. Toujeo max solostar, Basaglar kwikpen, Semglee)    Objective     Last Recorded Vitals:  BP Readings from Last 6 Encounters:   12/31/24 143/77   12/17/24 118/50   11/18/24 118/73   10/29/24 118/68   09/16/24 106/50   08/12/24 132/58      Wt Readings from Last 6 Encounters:   12/31/24 67.1 kg (148 lb)   12/17/24 66.7 kg (147 lb)   11/18/24 67.1 kg (148 lb)   11/04/24 65.8 kg (145 lb)   10/29/24 66.5 kg (146 lb 9.6 oz)   09/16/24 66.7 kg (147 lb)     BMI Readings from Last 6 Encounters:   12/31/24 29.39 kg/m²   12/17/24 29.19 kg/m²   11/18/24 29.89 kg/m²   11/04/24 28.80 kg/m²   10/29/24 29.11 kg/m²   09/16/24 29.19 kg/m²     Lab Review  Lab Results   Component Value Date    BILITOT 1.1 05/23/2024    CALCIUM 9.9 01/10/2025    CO2 31 01/10/2025     01/10/2025    CREATININE 1.46 (H) 01/10/2025    GLUCOSE 177 (H) 01/10/2025    ALKPHOS 75 05/23/2024    K 4.0 01/10/2025    PROT 6.9 05/23/2024    PROT 6.9 05/23/2024     01/10/2025    AST 22 09/10/2024    ALT 25 09/10/2024    BUN 23 01/10/2025    ANIONGAP 13 01/10/2025    MG 2.67 (H) 12/16/2023    PHOS 4.0 01/10/2025    ALBUMIN 4.5 01/10/2025    GFRF 37 (A) 09/16/2023     Lab Results   Component Value Date    TRIG 168 (H) 09/10/2024    CHOL 136 09/10/2024    LDLCALC 60 09/10/2024    HDL 42.4 09/10/2024     Lab  Results   Component Value Date    HGBA1C 7.5 (A) 12/17/2024    HGBA1C 7.5 (A) 09/16/2024    HGBA1C 8.5 (A) 06/18/2024     Component      Latest Ref Rng 9/16/2023 12/16/2023 1/12/2024 12/17/2024   ALBUMIN (MG/L) IN URINE      Not Established mg/L 119.4       Albumin/Creatinine Ratio 82.9 (H)  13.6  40.7 (H)  --    Creatinine, Urine Random      20.0 - 320.0 mg/dL 144.0  95.7  86.8  40.3    Albumin, Urine Random      Not established mg/L  13.0  35.3  <7.0       Legend:  (H) High    The 10-year ASCVD risk score (Prince PLASCENCIA, et al., 2019) is: 54.3%    Values used to calculate the score:      Age: 79 years      Sex: Female      Is Non- : No      Diabetic: Yes      Tobacco smoker: No      Systolic Blood Pressure: 143 mmHg      Is BP treated: Yes      HDL Cholesterol: 42.4 mg/dL      Total Cholesterol: 136 mg/dL    Assessment/Plan   Problem List Items Addressed This Visit       Type 2 diabetes mellitus with insulin therapy (Multi) - Primary     Patients diabetes is above goal with most recent A1c of 7.5% persistent from 9/16/2024 at 7.5% (Goal < 7%). CGM data reviewed via poly reports from her FSL2 reader. Time in target worsened at 27% for the 30 day reporting period and 73% above target. Her average glucose for the day reporting period is 211 mg/dL. No reported symptomatic hypoglycemia during review period. Advised to continue increase to  38 units of insulin glargine at this time with a 2 unit increase with lunch and dinner (10-12-12). The meal-time insulin was advised to change at our last visit. Advised to reduce to 8 units with breakfast on days where she will be swimming. Continue other therapy as prescribed. Follow up in two weeks.          Relevant Orders    Referral to Clinical Pharmacy     Compliance at present is estimated to be good.  Follow-up: I recommend diabetes care be 2/7/2025 at 9:20 am.   PCP Follow-Up: 12/17/2024  [facility] Follow-Up: Not scheduled    Continue all meds under the  continuation of care with the referring provider and clinical pharmacy team.

## 2025-02-04 ENCOUNTER — HOSPITAL ENCOUNTER (OUTPATIENT)
Dept: RADIOLOGY | Facility: CLINIC | Age: 80
Discharge: HOME | End: 2025-02-04
Payer: MEDICARE

## 2025-02-04 ENCOUNTER — HOSPITAL ENCOUNTER (OUTPATIENT)
Dept: CARDIOLOGY | Facility: CLINIC | Age: 80
Discharge: HOME | End: 2025-02-04
Payer: MEDICARE

## 2025-02-04 DIAGNOSIS — R07.9 RECURRENT CHEST PAIN: ICD-10-CM

## 2025-02-04 PROCEDURE — 78452 HT MUSCLE IMAGE SPECT MULT: CPT

## 2025-02-04 PROCEDURE — 3430000001 HC RX 343 DIAGNOSTIC RADIOPHARMACEUTICALS: Performed by: INTERNAL MEDICINE

## 2025-02-04 PROCEDURE — 93016 CV STRESS TEST SUPVJ ONLY: CPT | Performed by: STUDENT IN AN ORGANIZED HEALTH CARE EDUCATION/TRAINING PROGRAM

## 2025-02-04 PROCEDURE — 93017 CV STRESS TEST TRACING ONLY: CPT

## 2025-02-04 PROCEDURE — A9502 TC99M TETROFOSMIN: HCPCS | Performed by: INTERNAL MEDICINE

## 2025-02-04 PROCEDURE — 93018 CV STRESS TEST I&R ONLY: CPT | Performed by: STUDENT IN AN ORGANIZED HEALTH CARE EDUCATION/TRAINING PROGRAM

## 2025-02-04 PROCEDURE — 78452 HT MUSCLE IMAGE SPECT MULT: CPT | Performed by: INTERNAL MEDICINE

## 2025-02-04 RX ADMIN — TETROFOSMIN 35.2 MILLICURIE: 0.23 INJECTION, POWDER, LYOPHILIZED, FOR SOLUTION INTRAVENOUS at 09:14

## 2025-02-04 RX ADMIN — TETROFOSMIN 10.72 MILLICURIE: 0.23 INJECTION, POWDER, LYOPHILIZED, FOR SOLUTION INTRAVENOUS at 07:45

## 2025-02-06 ENCOUNTER — TELEPHONE (OUTPATIENT)
Dept: CARDIOLOGY | Facility: CLINIC | Age: 80
End: 2025-02-06
Payer: MEDICARE

## 2025-02-06 NOTE — TELEPHONE ENCOUNTER
----- Message from Ahsan Cook sent at 2/5/2025  5:09 PM EST -----  Notify patient of normal stress test results. Eleonora Varela  can follow up as scheduled.

## 2025-02-07 ENCOUNTER — APPOINTMENT (OUTPATIENT)
Dept: PHARMACY | Facility: HOSPITAL | Age: 80
End: 2025-02-07
Payer: MEDICARE

## 2025-02-07 DIAGNOSIS — E11.9 TYPE 2 DIABETES MELLITUS WITH INSULIN THERAPY (MULTI): ICD-10-CM

## 2025-02-07 DIAGNOSIS — Z79.4 TYPE 2 DIABETES MELLITUS WITH INSULIN THERAPY (MULTI): ICD-10-CM

## 2025-02-07 NOTE — PROGRESS NOTES
Patient is sent at the request of Alma Pavon MD for my opinion regarding Type 2 diabetes.  My final recommendations will be communicated back to the requesting provider by way of shared medical record.    Subjective     Eleonora Varela is a 79 y.o. year old female patient with type two diabetes mellitus, CKD G3b/A1, hypertension, dyslipidemia, and left renal artery stent for POONAM (2022). Patient follows with Dr. Pavon through the [facility] for cardiometabolic risk factor modification. She remains on basal/bolus insulin therapy, Ozempic 2 mg and Jardiance 25 mg once daily. She utilizes Freestyle Cory 2 plus sensors for glucose monitoring. We discussed changing her therapy from FSL2 to FSL3 for continuous glucose monitoring. She is connected to her reader device. During our previous visit I advised continuation of her basal therapy as prescribed with a 2 unit increase with lunch and dinner dosing. Today we are discussing her glycemic control with these changes.      Past Medical History:  She has a past medical history of Abnormal radiologic findings on diagnostic imaging of right kidney, Abnormal radiologic findings on diagnostic imaging of right kidney, Acute kidney failure, unspecified (CMS-HCC) (08/22/2020), BPV (benign positional vertigo) (01/20/2023), Carpal tunnel syndrome, right upper limb (01/24/2017), Chest pain, unspecified (03/01/2022), Contact with and (suspected) exposure to covid-19 (08/14/2021), Contact with and (suspected) exposure to other bacterial communicable diseases (02/10/2022), Elevation of levels of liver transaminase levels (11/29/2022), Encounter for gynecological examination (general) (routine) without abnormal findings (10/19/2015), Localized edema (08/15/2022), Neuropathy, 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 (01/20/2023), Nonspecific lymphadenitis, unspecified (07/02/2019), Osteoarthritis of spine with radiculopathy, cervical region (08/29/2014), Other chest pain (05/10/2021), Other conditions  influencing health status (02/11/2019), Other long term (current) drug therapy (12/03/2020), Other microscopic hematuria (02/01/2016), Other specified cardiac arrhythmias (07/31/2020), Other specified symptoms and signs involving the circulatory and respiratory systems (03/01/2022), Other symptoms and signs involving the musculoskeletal system (04/29/2014), Pain in right leg (02/05/2018), Personal history of colonic polyps (10/13/2014), Personal history of diseases of the skin and subcutaneous tissue (02/13/2019), Personal history of other diseases of the respiratory system (03/01/2022), Personal history of other drug therapy, Personal history of other drug therapy (10/20/2016), Personal history of other endocrine, nutritional and metabolic disease (11/29/2022), Personal history of other endocrine, nutritional and metabolic disease (11/29/2022), Pleurodynia (03/01/2022), Strain of unspecified muscle(s) and tendon(s) at lower leg level, right leg, initial encounter (12/05/2022), Toxic effect of venom of wasps, accidental (unintentional), initial encounter (08/10/2021), and Type 2 diabetes mellitus with diabetic cataract (03/10/2020).    Past Surgical History:  She has a past surgical history that includes Other surgical history (03/29/2021); Other surgical history (03/29/2021); Other surgical history (11/29/2022); Other surgical history (05/10/2021); Other surgical history (05/11/2022); Carpal tunnel release (10/13/2014); Other surgical history (10/13/2014); Incisional breast biopsy (Right); and Breast biopsy (Right).    Social History:  She reports that she has never smoked. She has never been exposed to tobacco smoke. She has never used smokeless tobacco. She reports that she does not currently use alcohol. She reports that she does not use drugs.    Family History:  Family History   Problem Relation Name Age of Onset    Prostate cancer Father      Diabetes type II Father      Breast cancer Sister Kanwal      Diabetes type II Sister Kanwal     Breast cancer Sister  62    Other (acute myocardial infraction) Brother  59            Testicular cancer Brother      Other (herion addiction) Daughter      Other (alcohol dependence) Daughter      Drug abuse Daughter      Other (Large b cell lymphoma of lymph nodes on neck) Maternal Grandfather      No Known Problems Father's Brother      Other (diffuse large b cell lymphoma of lymph nodes of neck) Half-Sister       Allergies:  Propoxyphene n-acetaminophen, Adhesive tape-silicones, Cefaclor, and Latex    DIABETES MELLITUS TYPE 2:      Current diabetic medications include:  Lantus solostar 100 units/mL: Inject 38 units subcutaneous once daily AM   Humalog KwikPen 100 units/mL: 10-12-12 units TID AC   Ozempic 2 mg inject 2 mg under the skin once a week on   Jardiance 25 mg: Take one tablet by mouth once daily     Previous Pharmacotherapy   Humulin N insulin   Metformin - d/c'ed by Dr. Shelley in     Adverse Effects: None    Glucose Readings:  Glucometer/CGM Type: Cory 2 plus  Glucometer: Onetouch Ultra 2     CGM Data    Current Sensor Wear: Yes; FSL2 with reader  Current Sensor days remainin  Sensors remaining for FSL2: 5 boxes     CGM Data - 2025  Time in Target   7 Day  Above Target: 62%  In Target: 37%  Below Target: 1% 14 day  Above Target: 61%  In Target: 38%  Below Target: 1%   30 days  Above Target: 74%  In Target: 26%  Below Target: 0%   Average glucose    Sensor Usage Low Glucose Events   7 days average: 184 mg/dL  12 am to 6 am 197 mg/dL  6 am to 12 pm 188 mg/L  12 to 6 pm 148 mg/dL  6 pm to 12 am 200 mg/dL    14 day average: 180 mg/dL  12 am to 6 am 181 mg/dL  6 am to 12 pm 187 mg/L  12 to 6 pm 162 mg/dL  6 pm to 12 am 191 mg/dL    30 day average: 209 mg/dL  12 am to 6 am 209 mg/dL  6 am to 12 pm 210 mg/L  12 to 6 pm 190 mg/dL  6 pm to 12 am 228 mg/dL   Not discussed - No reported missed days of sensor usage  7 Days: 1  2025: 67 mg/dL at 4:03 pm    2/6/2025: 78 mg/dL at 4:20 pm     14 Days: 0    30 Days: 0       CGM Data - 1/24/2025    Current Glucose: 295 mg/dL - Ate approximately 30 minutes     Time in Target   7 Day  Above Target: 85%  In Target: 15%  Below Target: 0% 14 day  Above Target: 83%  In Target: 17%  Below Target: 0%   30 days  Above Target: 73%  In Target: 27%  Below Target: 0%   Average glucose    Sensor Usage Low Glucose Events   7 days average: 238 mg/dL    12 am to 6 am 241 mg/dL  6 am to 12 pm 238 mg/L  12 to 6 pm 213 mg/dL  6 pm to 12 am 261 mg/dL    14 day average: 233 mg/dL    12 am to 6 am 233 mg/dL  6 am to 12 pm 225 mg/L  12 to 6 pm 217 mg/dL  6 pm to 12 am 259 mg/dL    30 day average: 211 mg/dL    12 am to 6 am 217 mg/dL  6 am to 12 pm 207 mg/L  12 to 6 pm 187 mg/dL  6 pm to 12 am 235 mg/dL Not discussed - No reported missed  7 Days: 0  14 Days: 0  30 Days: 0         CGM Data - 1/24/2025    Current Glucose: 295 mg/dL - Ate approximately 30 minutes     Time in Target   7 Day  Above Target: 85%  In Target: 15%  Below Target: 0% 14 day  Above Target: 83%  In Target: 17%  Below Target: 0%   30 days  Above Target: 73%  In Target: 27%  Below Target: 0%   Average glucose    Sensor Usage Low Glucose Events   7 days average: 238 mg/dL    12 am to 6 am 241 mg/dL  6 am to 12 pm 238 mg/L  12 to 6 pm 213 mg/dL  6 pm to 12 am 261 mg/dL    14 day average: 233 mg/dL    12 am to 6 am 233 mg/dL  6 am to 12 pm 225 mg/L  12 to 6 pm 217 mg/dL  6 pm to 12 am 259 mg/dL    30 day average: 211 mg/dL    12 am to 6 am 217 mg/dL  6 am to 12 pm 207 mg/L  12 to 6 pm 187 mg/dL  6 pm to 12 am 235 mg/dL Not discussed - No reported missed  7 Days: 0  14 Days: 0  30 Days: 0       CGM Data - 1/3/2025  Time in Target   7 Day  Above Target: 61%  In Target: 39%  Below Target: 0% 14 day  Above Target: 57%  In Target: 43%  Below Target: 0%   30 days  Above Target: 53%  In Target: 47%  Below Target: 0%   Average glucose    Sensor Usage Low Glucose Events   7 days average:  176 mg/dL  14 day average: 177 mg/dL  30 day average: 173 mg/dL Not discussed - No reported missed  7 Days: 0  14 Days: 0  30 Days: 0       CGM Data  Time in Target   7 Day  Above Target: 64%  In Target: 36%  Below Target: 0% 14 day  Above Target: 65%  In Target: 35%  Below Target: 0%   30 days  Above Target: 64%  In Target: 36%  Below Target: 0%   Average glucose    Sensor Usage Low Glucose Events   7 days average: 193 mg/dL  14 day average: 195 mg/dL  30 day average: 191 mg/dL 7 Days:  Scans per day: 10  Sensor data captured: 90%    14 Days:  Scans per day: 11  Sensor data captured: 94%    30 Days:  Scans per day: 11  Sensor data captured: 97% 7 Days: 0  14 Days: 0  30 Days: 0           Time in Target   7 Day  Above Target: 68%  In Target: 32%  Below Target: 0% 14 day  Above Target: 60%  In Target: 40%  Below Target: 0%   30 days  Above Target: 63%  In Target: 37%  Below Target: 0%   Average glucose    Sensor Usage Low Glucose Events   7 days average: 192 mg/dL  14 day average: 178 mg/dL  30 day average: 185 mg/dL  90 day average: 179 mg/dL  7 Days:  Scans per day: 10  Sensor data captured: 92%    14 Days:  Scans per day: 11  Sensor data captured: 97%    30 Days:  Scans per day: 11  Sensor data captured: 99% 7 Days: 0  14 Days: 0  30 Days: 0       Affordability/Accessibility:   Lantus Solostar not covered in 2025 - Therapy required to be switched to alternative (I.e. Toujeo max solostar, Basaglar kwikpen, Semglee)    Objective     Last Recorded Vitals:  BP Readings from Last 6 Encounters:   12/31/24 143/77   12/17/24 118/50   11/18/24 118/73   10/29/24 118/68   09/16/24 106/50   08/12/24 132/58      Wt Readings from Last 6 Encounters:   12/31/24 67.1 kg (148 lb)   12/17/24 66.7 kg (147 lb)   11/18/24 67.1 kg (148 lb)   11/04/24 65.8 kg (145 lb)   10/29/24 66.5 kg (146 lb 9.6 oz)   09/16/24 66.7 kg (147 lb)     BMI Readings from Last 6 Encounters:   12/31/24 29.39 kg/m²   12/17/24 29.19 kg/m²   11/18/24 29.89 kg/m²    11/04/24 28.80 kg/m²   10/29/24 29.11 kg/m²   09/16/24 29.19 kg/m²     Lab Review  Lab Results   Component Value Date    BILITOT 1.1 05/23/2024    CALCIUM 9.9 01/10/2025    CO2 31 01/10/2025     01/10/2025    CREATININE 1.46 (H) 01/10/2025    GLUCOSE 177 (H) 01/10/2025    ALKPHOS 75 05/23/2024    K 4.0 01/10/2025    PROT 6.9 05/23/2024    PROT 6.9 05/23/2024     01/10/2025    AST 22 09/10/2024    ALT 25 09/10/2024    BUN 23 01/10/2025    ANIONGAP 13 01/10/2025    MG 2.67 (H) 12/16/2023    PHOS 4.0 01/10/2025    ALBUMIN 4.5 01/10/2025    GFRF 37 (A) 09/16/2023     Lab Results   Component Value Date    TRIG 168 (H) 09/10/2024    CHOL 136 09/10/2024    LDLCALC 60 09/10/2024    HDL 42.4 09/10/2024     Lab Results   Component Value Date    HGBA1C 7.5 (A) 12/17/2024    HGBA1C 7.5 (A) 09/16/2024    HGBA1C 8.5 (A) 06/18/2024     Component      Latest Ref Rng 9/16/2023 12/16/2023 1/12/2024 12/17/2024   ALBUMIN (MG/L) IN URINE      Not Established mg/L 119.4       Albumin/Creatinine Ratio 82.9 (H)  13.6  40.7 (H)  --    Creatinine, Urine Random      20.0 - 320.0 mg/dL 144.0  95.7  86.8  40.3    Albumin, Urine Random      Not established mg/L  13.0  35.3  <7.0       Legend:  (H) High    The 10-year ASCVD risk score (Prince PLASCENCIA, et al., 2019) is: 54.3%    Values used to calculate the score:      Age: 79 years      Sex: Female      Is Non- : No      Diabetic: Yes      Tobacco smoker: No      Systolic Blood Pressure: 143 mmHg      Is BP treated: Yes      HDL Cholesterol: 42.4 mg/dL      Total Cholesterol: 136 mg/dL    Assessment/Plan   Problem List Items Addressed This Visit       Type 2 diabetes mellitus with insulin therapy (Multi)     Patients diabetes is above goal with most recent A1c of 7.5% persistent from 9/16/2024 at 7.5% (Goal < 7%). CGM data reviewed via poly reports from her FSL2 reader. Time in target for the 14 day reporting period is notably improved compared to her 30 day  average. Her 14 day average is 180 mg/dL with a time in target at 38% compared to 26% in the 30 day report. She reports one low glucose reading from her logbook at 67 mg/dL with endorses symptoms of hand tingling. Denies dizziness, lightheadedness, LOC. Glucose raised to 78 mg/dL within 17 minutes after concentrated carb consumption. I advised increasing her morning meal-time insulin to 12 units for an overall daily pattern of 12 units with meals. Advised to continue 38 units of basal insulin. Discussed targeting an average glucose <155 mg/dL and time in target >70%. Continue Ozempic 2 mg and jardiance 25 mg. Consider administration of therapy review. Follow up in three weeks.         Relevant Orders    Referral to Clinical Pharmacy     Compliance at present is estimated to be good.  Follow-up: I recommend diabetes care be 02/28/2025 at 9:20 am.   PCP Follow-Up: 4/9/2025  [facility] Follow-Up: Not scheduled    Continue all meds under the continuation of care with the referring provider and clinical pharmacy team.

## 2025-02-08 NOTE — ASSESSMENT & PLAN NOTE
Patients diabetes is above goal with most recent A1c of 7.5% persistent from 9/16/2024 at 7.5% (Goal < 7%). CGM data reviewed via poly reports from her FSL2 reader. Time in target for the 14 day reporting period is notably improved compared to her 30 day average. Her 14 day average is 180 mg/dL with a time in target at 38% compared to 26% in the 30 day report. She reports one low glucose reading from her logbook at 67 mg/dL with endorses symptoms of hand tingling. Denies dizziness, lightheadedness, LOC. Glucose raised to 78 mg/dL within 17 minutes after concentrated carb consumption. I advised increasing her morning meal-time insulin to 12 units for an overall daily pattern of 12 units with meals. Advised to continue 38 units of basal insulin. Discussed targeting an average glucose <155 mg/dL and time in target >70%. Continue Ozempic 2 mg and jardiance 25 mg. Consider administration of therapy review. Follow up in three weeks.  
(3) assistive equipment and person

## 2025-02-28 ENCOUNTER — APPOINTMENT (OUTPATIENT)
Dept: PHARMACY | Facility: HOSPITAL | Age: 80
End: 2025-02-28
Payer: MEDICARE

## 2025-02-28 DIAGNOSIS — E11.9 TYPE 2 DIABETES MELLITUS WITH INSULIN THERAPY (MULTI): ICD-10-CM

## 2025-02-28 DIAGNOSIS — Z79.4 TYPE 2 DIABETES MELLITUS WITH INSULIN THERAPY (MULTI): ICD-10-CM

## 2025-02-28 RX ORDER — INSULIN GLARGINE 100 [IU]/ML
INJECTION, SOLUTION SUBCUTANEOUS
Qty: 30 ML | Refills: 3 | Status: SHIPPED | OUTPATIENT
Start: 2025-02-28

## 2025-02-28 RX ORDER — INSULIN GLARGINE 100 [IU]/ML
INJECTION, SOLUTION SUBCUTANEOUS
Qty: 30 ML | Refills: 3 | Status: SHIPPED | OUTPATIENT
Start: 2025-02-28 | End: 2025-02-28 | Stop reason: SDUPTHER

## 2025-02-28 NOTE — ASSESSMENT & PLAN NOTE
Above goal with most recent A1c of 7.5% persistent from 9/16/2024 at 7.5% (Goal < 7%). CGM data reviewed via cory reports from her FSL2 reader. 7 day time in target improved at 54% and average glucose of 164 mg/dL. 30 day average for the reporting period is 187 mg/dL and time in target at 36%. Denies dizziness, lightheadedness, LOC. Continue therapy as prescribed except increase Lantus to 40 units daily. Begin use with Freestyle Cory 3 plus sensors. Discussed targeting an average glucose <155 mg/dL and time in target >70%. Follow up in 6 weeks.

## 2025-02-28 NOTE — PROGRESS NOTES
Patient is sent at the request of Alma Pavon MD for my opinion regarding Type 2 diabetes.  My final recommendations will be communicated back to the requesting provider by way of shared medical record.    Subjective     Eleonora Varela is a 79 y.o. year old female patient with type two diabetes mellitus, CKD G3b/A1, hypertension, dyslipidemia, and left renal artery stent for POONAM (2022). Patient follows with Dr. Pavon through the [facility] clinic for cardiometabolic risk factor modification. She remains on basal/bolus insulin therapy, Ozempic 2 mg and Jardiance 25 mg once daily. She utilizes Freestyle Cory 2 plus sensors for glucose monitoring. We discussed changing her therapy from FSL2 to FSL3 for continuous glucose monitoring. She is connected to her reader device. At last visit I advised her to continue with 38 units of basal insulin along with a meal-time dosing of 12-12-12. She remains on Ozempic 2 mg and Jardiance 25 mg daily. Today we are discussing her glycemic control with these changes.      Past Medical History:  She has a past medical history of Abnormal radiologic findings on diagnostic imaging of right kidney, Abnormal radiologic findings on diagnostic imaging of right kidney, Acute kidney failure, unspecified (CMS-HCC) (08/22/2020), BPV (benign positional vertigo) (01/20/2023), Carpal tunnel syndrome, right upper limb (01/24/2017), Chest pain, unspecified (03/01/2022), Contact with and (suspected) exposure to covid-19 (08/14/2021), Contact with and (suspected) exposure to other bacterial communicable diseases (02/10/2022), Elevation of levels of liver transaminase levels (11/29/2022), Encounter for gynecological examination (general) (routine) without abnormal findings (10/19/2015), Localized edema (08/15/2022), Neuropathy, 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 (01/20/2023), Nonspecific lymphadenitis, unspecified (07/02/2019), Osteoarthritis of spine with radiculopathy, cervical region (08/29/2014), Other chest pain  (05/10/2021), Other conditions influencing health status (02/11/2019), Other long term (current) drug therapy (12/03/2020), Other microscopic hematuria (02/01/2016), Other specified cardiac arrhythmias (07/31/2020), Other specified symptoms and signs involving the circulatory and respiratory systems (03/01/2022), Other symptoms and signs involving the musculoskeletal system (04/29/2014), Pain in right leg (02/05/2018), Personal history of colonic polyps (10/13/2014), Personal history of diseases of the skin and subcutaneous tissue (02/13/2019), Personal history of other diseases of the respiratory system (03/01/2022), Personal history of other drug therapy, Personal history of other drug therapy (10/20/2016), Personal history of other endocrine, nutritional and metabolic disease (11/29/2022), Personal history of other endocrine, nutritional and metabolic disease (11/29/2022), Pleurodynia (03/01/2022), Strain of unspecified muscle(s) and tendon(s) at lower leg level, right leg, initial encounter (12/05/2022), Toxic effect of venom of wasps, accidental (unintentional), initial encounter (08/10/2021), and Type 2 diabetes mellitus with diabetic cataract (03/10/2020).    Past Surgical History:  She has a past surgical history that includes Other surgical history (03/29/2021); Other surgical history (03/29/2021); Other surgical history (11/29/2022); Other surgical history (05/10/2021); Other surgical history (05/11/2022); Carpal tunnel release (10/13/2014); Other surgical history (10/13/2014); Incisional breast biopsy (Right); and Breast biopsy (Right).    Social History:  She reports that she has never smoked. She has never been exposed to tobacco smoke. She has never used smokeless tobacco. She reports that she does not currently use alcohol. She reports that she does not use drugs.    Family History:  Family History   Problem Relation Name Age of Onset    Prostate cancer Father      Diabetes type II Father      Breast  cancer Sister Kanwal     Diabetes type II Sister Kanwal     Breast cancer Sister  62    Other (acute myocardial infraction) Brother  59            Testicular cancer Brother      Other (herion addiction) Daughter      Other (alcohol dependence) Daughter      Drug abuse Daughter      Other (Large b cell lymphoma of lymph nodes on neck) Maternal Grandfather      No Known Problems Father's Brother      Other (diffuse large b cell lymphoma of lymph nodes of neck) Half-Sister       Allergies:  Propoxyphene n-acetaminophen, Adhesive tape-silicones, Cefaclor, and Latex    DIABETES MELLITUS TYPE 2:      Current diabetic medications include:  Lantus solostar 100 units/mL: Inject 38 units subcutaneous once daily AM   Humalog KwikPen 100 units/mL: 12-12 units TID AC   Ozempic 2 mg inject 2 mg under the skin once a week on   Jardiance 25 mg: Take one tablet by mouth once daily     Therapy remaining:   Lantus solostar - 1 injection remaining     Previous Pharmacotherapy   Humulin N insulin   Metformin - d/c'ed by Dr. Shelley in     Adverse Effects: None    Glucose Readings:  Glucometer/CGM Type: Cory 2 plus  Glucometer: Onetouch Ultra 2     CGM Data    Current Sensor Wear: Yes; FSL2 with reader  Sensors remaining for FSL2: 5 boxes     CGM Data - 2025    Freestyle Cory 2 sensors remainin     Time in [employer]   7 Day  Above Target: 46%  In Target: 54%  Below Target: 0% 14 day  Above Target: 60%  In Target: 40%  Below Target: 0%   30 days  Above Target: 64%  In Target: 36%  Below Target: 0%   Average glucose    Sensor Usage Low Glucose Events   7 days average: 164 mg/dL  12 am to 6 am 176 mg/dL  6 am to 12 pm 155 mg/L  12 to 6 pm 142 mg/dL  6 pm to 12 am 184 mg/dL    14 day average: 178 mg/dL  12 am to 6 am 179 mg/dL  6 am to 12 pm 172 mg/L  12 to 6 pm 163 mg/dL  6 pm to 12 am 199 mg/dL    30 day average: 187 mg/dL  12 am to 6 am 192 mg/dL  6 am to 12 pm 181 mg/L  12 to 6 pm 166 mg/dL  6 pm to 12 am 209  mg/dL No reported missed days of sensor usage  7 Days: 0  14 Days: 1 - Between 12 p and 6 p  30 Days: 0       CGM Data - 2/7/2025  Time in Target   7 Day  Above Target: 62%  In Target: 37%  Below Target: 1% 14 day  Above Target: 61%  In Target: 38%  Below Target: 1%   30 days  Above Target: 74%  In Target: 26%  Below Target: 0%   Average glucose    Sensor Usage Low Glucose Events   7 days average: 184 mg/dL  12 am to 6 am 197 mg/dL  6 am to 12 pm 188 mg/L  12 to 6 pm 148 mg/dL  6 pm to 12 am 200 mg/dL    14 day average: 180 mg/dL  12 am to 6 am 181 mg/dL  6 am to 12 pm 187 mg/L  12 to 6 pm 162 mg/dL  6 pm to 12 am 191 mg/dL    30 day average: 209 mg/dL  12 am to 6 am 209 mg/dL  6 am to 12 pm 210 mg/L  12 to 6 pm 190 mg/dL  6 pm to 12 am 228 mg/dL   Not discussed - No reported missed days of sensor usage  7 Days: 1  2/6/2025: 67 mg/dL at 4:03 pm   2/6/2025: 78 mg/dL at 4:20 pm     14 Days: 0    30 Days: 0       CGM Data - 1/24/2025    Time in Target   7 Day  Above Target: 85%  In Target: 15%  Below Target: 0% 14 day  Above Target: 83%  In Target: 17%  Below Target: 0%   30 days  Above Target: 73%  In Target: 27%  Below Target: 0%   Average glucose    Sensor Usage Low Glucose Events   7 days average: 238 mg/dL    12 am to 6 am 241 mg/dL  6 am to 12 pm 238 mg/L  12 to 6 pm 213 mg/dL  6 pm to 12 am 261 mg/dL    14 day average: 233 mg/dL    12 am to 6 am 233 mg/dL  6 am to 12 pm 225 mg/L  12 to 6 pm 217 mg/dL  6 pm to 12 am 259 mg/dL    30 day average: 211 mg/dL    12 am to 6 am 217 mg/dL  6 am to 12 pm 207 mg/L  12 to 6 pm 187 mg/dL  6 pm to 12 am 235 mg/dL Not discussed - No reported missed  7 Days: 0  14 Days: 0  30 Days: 0       CGM Data - 1/24/2025    Time in Target   7 Day  Above Target: 85%  In Target: 15%  Below Target: 0% 14 day  Above Target: 83%  In Target: 17%  Below Target: 0%   30 days  Above Target: 73%  In Target: 27%  Below Target: 0%   Average glucose    Sensor Usage Low Glucose Events   7 days average:  238 mg/dL    12 am to 6 am 241 mg/dL  6 am to 12 pm 238 mg/L  12 to 6 pm 213 mg/dL  6 pm to 12 am 261 mg/dL    14 day average: 233 mg/dL    12 am to 6 am 233 mg/dL  6 am to 12 pm 225 mg/L  12 to 6 pm 217 mg/dL  6 pm to 12 am 259 mg/dL    30 day average: 211 mg/dL    12 am to 6 am 217 mg/dL  6 am to 12 pm 207 mg/L  12 to 6 pm 187 mg/dL  6 pm to 12 am 235 mg/dL Not discussed - No reported missed  7 Days: 0  14 Days: 0  30 Days: 0       CGM Data - 1/3/2025  Time in Target   7 Day  Above Target: 61%  In Target: 39%  Below Target: 0% 14 day  Above Target: 57%  In Target: 43%  Below Target: 0%   30 days  Above Target: 53%  In Target: 47%  Below Target: 0%   Average glucose    Sensor Usage Low Glucose Events   7 days average: 176 mg/dL  14 day average: 177 mg/dL  30 day average: 173 mg/dL Not discussed - No reported missed  7 Days: 0  14 Days: 0  30 Days: 0       CGM Data  Time in Target   7 Day  Above Target: 64%  In Target: 36%  Below Target: 0% 14 day  Above Target: 65%  In Target: 35%  Below Target: 0%   30 days  Above Target: 64%  In Target: 36%  Below Target: 0%   Average glucose    Sensor Usage Low Glucose Events   7 days average: 193 mg/dL  14 day average: 195 mg/dL  30 day average: 191 mg/dL 7 Days:  Scans per day: 10  Sensor data captured: 90%    14 Days:  Scans per day: 11  Sensor data captured: 94%    30 Days:  Scans per day: 11  Sensor data captured: 97% 7 Days: 0  14 Days: 0  30 Days: 0       Affordability/Accessibility:   Lantus solostar cost/coverage reviewed - $0    Objective     Last Recorded Vitals:  BP Readings from Last 6 Encounters:   12/31/24 143/77   12/17/24 118/50   11/18/24 118/73   10/29/24 118/68   09/16/24 106/50   08/12/24 132/58      Wt Readings from Last 6 Encounters:   12/31/24 67.1 kg (148 lb)   12/17/24 66.7 kg (147 lb)   11/18/24 67.1 kg (148 lb)   11/04/24 65.8 kg (145 lb)   10/29/24 66.5 kg (146 lb 9.6 oz)   09/16/24 66.7 kg (147 lb)     BMI Readings from Last 6 Encounters:   12/31/24  29.39 kg/m²   12/17/24 29.19 kg/m²   11/18/24 29.89 kg/m²   11/04/24 28.80 kg/m²   10/29/24 29.11 kg/m²   09/16/24 29.19 kg/m²     Lab Review  Lab Results   Component Value Date    BILITOT 1.1 05/23/2024    CALCIUM 9.9 01/10/2025    CO2 31 01/10/2025     01/10/2025    CREATININE 1.46 (H) 01/10/2025    GLUCOSE 177 (H) 01/10/2025    ALKPHOS 75 05/23/2024    K 4.0 01/10/2025    PROT 6.9 05/23/2024    PROT 6.9 05/23/2024     01/10/2025    AST 22 09/10/2024    ALT 25 09/10/2024    BUN 23 01/10/2025    ANIONGAP 13 01/10/2025    MG 2.67 (H) 12/16/2023    PHOS 4.0 01/10/2025    ALBUMIN 4.5 01/10/2025    GFRF 37 (A) 09/16/2023     Lab Results   Component Value Date    TRIG 168 (H) 09/10/2024    CHOL 136 09/10/2024    LDLCALC 60 09/10/2024    HDL 42.4 09/10/2024     Lab Results   Component Value Date    HGBA1C 7.5 (A) 12/17/2024    HGBA1C 7.5 (A) 09/16/2024    HGBA1C 8.5 (A) 06/18/2024     Component      Latest Ref Rng 9/16/2023 12/16/2023 1/12/2024 12/17/2024   ALBUMIN (MG/L) IN URINE      Not Established mg/L 119.4       Albumin/Creatinine Ratio 82.9 (H)  13.6  40.7 (H)  --    Creatinine, Urine Random      20.0 - 320.0 mg/dL 144.0  95.7  86.8  40.3    Albumin, Urine Random      Not established mg/L  13.0  35.3  <7.0       Legend:  (H) High    The 10-year ASCVD risk score (Prince DK, et al., 2019) is: 54.3%    Values used to calculate the score:      Age: 79 years      Sex: Female      Is Non- : No      Diabetic: Yes      Tobacco smoker: No      Systolic Blood Pressure: 143 mmHg      Is BP treated: Yes      HDL Cholesterol: 42.4 mg/dL      Total Cholesterol: 136 mg/dL    Assessment/Plan   Problem List Items Addressed This Visit       Type 2 diabetes mellitus with insulin therapy (Multi)     Above goal with most recent A1c of 7.5% persistent from 9/16/2024 at 7.5% (Goal < 7%). CGM data reviewed via poly reports from her FSL2 reader. 7 day time in target improved at 54% and average glucose  of 164 mg/dL. 30 day average for the reporting period is 187 mg/dL and time in target at 36%. Denies dizziness, lightheadedness, LOC. Continue therapy as prescribed except increase Lantus to 40 units daily. Begin use with Freestyle Cory 3 plus sensors. Discussed targeting an average glucose <155 mg/dL and time in target >70%. Follow up in 6 weeks.          Relevant Medications    insulin glargine (Lantus Solostar U-100 Insulin) 100 unit/mL (3 mL) pen    Other Relevant Orders    Referral to Clinical Pharmacy     Compliance at present is estimated to be good.  Follow-up: I recommend diabetes care be 4/11/2025 at 9:20 am.   PCP Follow-Up: 4/9/2025  [facility] Follow-Up: 5/13/2025    Continue all meds under the continuation of care with the referring provider and clinical pharmacy team.

## 2025-03-03 PROCEDURE — RXMED WILLOW AMBULATORY MEDICATION CHARGE

## 2025-03-06 ENCOUNTER — OFFICE VISIT (OUTPATIENT)
Dept: CARDIOLOGY | Facility: CLINIC | Age: 80
End: 2025-03-06
Payer: MEDICARE

## 2025-03-06 ENCOUNTER — PHARMACY VISIT (OUTPATIENT)
Dept: PHARMACY | Facility: CLINIC | Age: 80
End: 2025-03-06
Payer: MEDICARE

## 2025-03-06 VITALS
DIASTOLIC BLOOD PRESSURE: 68 MMHG | BODY MASS INDEX: 29.45 KG/M2 | HEART RATE: 77 BPM | HEIGHT: 60 IN | SYSTOLIC BLOOD PRESSURE: 111 MMHG | WEIGHT: 150 LBS | OXYGEN SATURATION: 98 %

## 2025-03-06 DIAGNOSIS — E11.9 TYPE 2 DIABETES MELLITUS WITH INSULIN THERAPY (MULTI): ICD-10-CM

## 2025-03-06 DIAGNOSIS — R07.9 RECURRENT CHEST PAIN: ICD-10-CM

## 2025-03-06 DIAGNOSIS — I10 PRIMARY HYPERTENSION: ICD-10-CM

## 2025-03-06 DIAGNOSIS — E11.9 TYPE 2 DIABETES MELLITUS WITHOUT COMPLICATION, WITHOUT LONG-TERM CURRENT USE OF INSULIN (MULTI): ICD-10-CM

## 2025-03-06 DIAGNOSIS — Z79.4 TYPE 2 DIABETES MELLITUS WITH INSULIN THERAPY (MULTI): ICD-10-CM

## 2025-03-06 DIAGNOSIS — N18.32 STAGE 3B CHRONIC KIDNEY DISEASE (MULTI): ICD-10-CM

## 2025-03-06 DIAGNOSIS — E11.69 HYPERLIPIDEMIA ASSOCIATED WITH TYPE 2 DIABETES MELLITUS (MULTI): Primary | ICD-10-CM

## 2025-03-06 DIAGNOSIS — R93.1 AGATSTON CAC SCORE, >400: ICD-10-CM

## 2025-03-06 DIAGNOSIS — E78.5 HYPERLIPIDEMIA ASSOCIATED WITH TYPE 2 DIABETES MELLITUS (MULTI): Primary | ICD-10-CM

## 2025-03-06 DIAGNOSIS — Q21.10 ATRIAL SEPTAL DEFECT (HHS-HCC): ICD-10-CM

## 2025-03-06 DIAGNOSIS — E78.5 HYPERLIPIDEMIA, UNSPECIFIED HYPERLIPIDEMIA TYPE: ICD-10-CM

## 2025-03-06 DIAGNOSIS — R01.1 SYSTOLIC MURMUR: ICD-10-CM

## 2025-03-06 DIAGNOSIS — E11.21 DIABETIC NEPHROPATHY ASSOCIATED WITH TYPE 2 DIABETES MELLITUS (MULTI): ICD-10-CM

## 2025-03-06 DIAGNOSIS — I10 ESSENTIAL HYPERTENSION: ICD-10-CM

## 2025-03-06 DIAGNOSIS — E78.49 OTHER HYPERLIPIDEMIA: ICD-10-CM

## 2025-03-06 DIAGNOSIS — I70.1 RENAL ARTERY STENOSIS (CMS-HCC): ICD-10-CM

## 2025-03-06 DIAGNOSIS — I25.10 CORONARY ARTERY DISEASE INVOLVING NATIVE CORONARY ARTERY OF NATIVE HEART WITHOUT ANGINA PECTORIS: ICD-10-CM

## 2025-03-06 DIAGNOSIS — Z98.890 HISTORY OF STENT INSERTION OF RENAL ARTERY: ICD-10-CM

## 2025-03-06 DIAGNOSIS — I70.1 ATHEROSCLEROSIS OF RENAL ARTERY (CMS-HCC): ICD-10-CM

## 2025-03-06 PROCEDURE — 1159F MED LIST DOCD IN RCRD: CPT

## 2025-03-06 PROCEDURE — 1157F ADVNC CARE PLAN IN RCRD: CPT

## 2025-03-06 PROCEDURE — 3074F SYST BP LT 130 MM HG: CPT

## 2025-03-06 PROCEDURE — 3078F DIAST BP <80 MM HG: CPT

## 2025-03-06 PROCEDURE — 99214 OFFICE O/P EST MOD 30 MIN: CPT

## 2025-03-06 PROCEDURE — 1160F RVW MEDS BY RX/DR IN RCRD: CPT

## 2025-03-06 NOTE — PROGRESS NOTES
"Chief Complaint:   Follow-up     History Of Present Illness:    I am seeing Eleonora today for follow up after testing. She has undergone an echo and exercise stress test. Patient denies any chest pain today, SOB, palpitations, lightheadedness, dizziness, syncope, PND, without lower extremity edema.      12/31/2024  Eleonora Varela is a 79 y.o. female with PMHx of CAD with elevated CT calcium score, HTN, HLD, renal artery stenosis s/p PCI with stent to left renal artery, atrial septal defect, T2DM, and CKD stage 3Bb, presenting today for follow up. She reports she is feeling well since last seen in our office.  Patient reports that she did see her PCP soon going to home she mention she had some chest discomfort and her PCP ordered testing which include stress test and echo.  Patient denies any chest pain today, SOB, palpitations, lightheadedness, dizziness, syncope, PND, without lower extremity edema.  She reports she is physically active and is working out at least 5 days of the week including water aerobics and yoga.    Last Recorded Vitals:  Vitals:    03/06/25 0800   BP: 111/68   BP Location: Left arm   Patient Position: Sitting   BP Cuff Size: Adult   Pulse: 77   SpO2: 98%   Weight: 68 kg (150 lb)   Height: 1.511 m (4' 11.5\")     Past Medical History:  She has a past medical history of Abnormal radiologic findings on diagnostic imaging of right kidney, Abnormal radiologic findings on diagnostic imaging of right kidney, Acute kidney failure, unspecified (CMS-HCC) (08/22/2020), BPV (benign positional vertigo) (01/20/2023), Carpal tunnel syndrome, right upper limb (01/24/2017), Chest pain, unspecified (03/01/2022), Contact with and (suspected) exposure to covid-19 (08/14/2021), Contact with and (suspected) exposure to other bacterial communicable diseases (02/10/2022), Elevation of levels of liver transaminase levels (11/29/2022), Encounter for gynecological examination (general) (routine) without abnormal findings " (10/19/2015), Localized edema (08/15/2022), Neuropathy, 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 (01/20/2023), Nonspecific lymphadenitis, unspecified (07/02/2019), Osteoarthritis of spine with radiculopathy, cervical region (08/29/2014), Other chest pain (05/10/2021), Other conditions influencing health status (02/11/2019), Other long term (current) drug therapy (12/03/2020), Other microscopic hematuria (02/01/2016), Other specified cardiac arrhythmias (07/31/2020), Other specified symptoms and signs involving the circulatory and respiratory systems (03/01/2022), Other symptoms and signs involving the musculoskeletal system (04/29/2014), Pain in right leg (02/05/2018), Personal history of colonic polyps (10/13/2014), Personal history of diseases of the skin and subcutaneous tissue (02/13/2019), Personal history of other diseases of the respiratory system (03/01/2022), Personal history of other drug therapy, Personal history of other drug therapy (10/20/2016), Personal history of other endocrine, nutritional and metabolic disease (11/29/2022), Personal history of other endocrine, nutritional and metabolic disease (11/29/2022), Pleurodynia (03/01/2022), Strain of unspecified muscle(s) and tendon(s) at lower leg level, right leg, initial encounter (12/05/2022), Toxic effect of venom of wasps, accidental (unintentional), initial encounter (08/10/2021), and Type 2 diabetes mellitus with diabetic cataract (03/10/2020).    Past Surgical History:  She has a past surgical history that includes Other surgical history (03/29/2021); Other surgical history (03/29/2021); Other surgical history (11/29/2022); Other surgical history (05/10/2021); Other surgical history (05/11/2022); Carpal tunnel release (10/13/2014); Other surgical history (10/13/2014); Incisional breast biopsy (Right); and Breast biopsy (Right).      Social History:  She reports that she has never smoked. She has never been exposed to tobacco smoke. She has never used smokeless tobacco.  She reports that she does not currently use alcohol. She reports that she does not use drugs.    Family History:  Family History   Problem Relation Name Age of Onset    Prostate cancer Father      Diabetes type II Father      Breast cancer Sister Kanwal     Diabetes type II Sister Kanwal     Breast cancer Sister  62    Other (acute myocardial infraction) Brother  59        2021    Testicular cancer Brother      Other (herion addiction) Daughter      Other (alcohol dependence) Daughter      Drug abuse Daughter      Other (Large b cell lymphoma of lymph nodes on neck) Maternal Grandfather      No Known Problems Father's Brother      Other (diffuse large b cell lymphoma of lymph nodes of neck) Half-Sister       Allergies:  Propoxyphene n-acetaminophen, Adhesive tape-silicones, Cefaclor, and Latex    Outpatient Medications:  Current Outpatient Medications   Medication Instructions    amLODIPine (NORVASC) 10 mg, oral, Daily    aspirin 81 mg EC tablet 1 tablet, Daily    blood-glucose meter (OneTouch Ultra2 Meter) misc Use as directed to monitor blood glucose once daily    blood-glucose sensor (FreeStyle Cory 3 Plus Sensor) device Apply 1 sensor to the back of the upper arm every 15 days. Change sensor every 15 days.    calcium carb/vit D3/minerals (CALCIUM 600 + MINERALS ORAL) 1,200 mg, Daily    carvedilol (COREG) 25 mg, oral, 2 times daily (morning and late afternoon)    cholecalciferol (VITAMIN D-3) 25 mcg, Daily    CRANBERRY ORAL 1 tablet, 2 times daily    cyanocobalamin, vitamin B-12, 1,000 mcg tablet, sublingual Place under the tongue. one tab sublingual daily five days per week, monday thru friday    diabetic supplies, miscellan. misc Onetouch ultrasoft lancets misc    empagliflozin (Jardiance) 25 mg TAKE 1 TABLET BY MOUTH EVERY MORNING    ezetimibe (ZETIA) 10 mg, oral, Daily, DAILY    fluvastatin (LESCOL) 40 mg, oral, Nightly    FreeStyle Cory 3 [place] misc Use as instructed to monitor blood glucose four times  daily.    FreeStyle Cory sensor system (FreeStyle Cory 2 Sensor) kit Use as instructed    hydroCHLOROthiazide (HYDRODIURIL) 12.5 mg, oral, Daily, DAILY    insulin glargine (Lantus Solostar U-100 Insulin) 100 unit/mL (3 mL) pen Inject 40 units under the skin once daily. Take as directed per insulin instructions.    insulin lispro (HUMALOG) 10 Units, subcutaneous, 3 times daily (morning, midday, late afternoon), Take as directed per insulin instructions    lancets (MICROLET LANCET MISC) Test sugar 6 times a day    loratadine (CLARITIN REDITABS) 10 mg, Daily    MAGNESIUM CITRATE ORAL 1 tablet, Nightly    meclizine (ANTIVERT) 12.5 mg, oral, 3 times daily PRN    omega-3/dha/epa/fish oil (OMEGA-3 ORAL) 1 capsule, 2 times daily    OneTouch Ultra Test strip Use as directed once daily to test blood glucose.    Ozempic 2 mg, subcutaneous, Once Weekly    psyllium (Metamucil) 3.4 gram packet Nightly     Physical Exam:  General: no acute distress  HEENT: EOMI, no scleral icterus.  Lungs: Clear to auscultation bilaterally without wheezing, rales, or rhonchi.  Cardiovascular: Regular rhythm and rate. Normal S1 and S2. No murmurs, rubs, or gallops are appreciated. JVP normal.  Abdomen: Soft, nontender, nondistended. Bowel sounds present.  Extremities: Warm and well perfused with equal 2+ pulses bilaterally.  No edema.  Neurologic: Alert and oriented x3.      Last Labs:  CBC -  Lab Results   Component Value Date    WBC 8.2 12/17/2024    HGB 16.2 (H) 12/17/2024    HCT 48.1 (H) 12/17/2024    MCV 88 12/17/2024     12/17/2024     CMP -  Lab Results   Component Value Date    CALCIUM 9.9 01/10/2025    PHOS 4.0 01/10/2025    PROT 6.9 05/23/2024    PROT 6.9 05/23/2024    ALBUMIN 4.5 01/10/2025    AST 22 09/10/2024    ALT 25 09/10/2024    ALKPHOS 75 05/23/2024    BILITOT 1.1 05/23/2024     LIPID PANEL -   Lab Results   Component Value Date    CHOL 136 09/10/2024    TRIG 168 (H) 09/10/2024    HDL 42.4 09/10/2024    CHHDL 3.2  09/10/2024    LDLF 79 09/16/2023    VLDL 34 09/10/2024    NHDL 94 09/10/2024     RENAL FUNCTION PANEL -   Lab Results   Component Value Date    GLUCOSE 177 (H) 01/10/2025     01/10/2025    K 4.0 01/10/2025     01/10/2025    CO2 31 01/10/2025    ANIONGAP 13 01/10/2025    BUN 23 01/10/2025    CREATININE 1.46 (H) 01/10/2025    CALCIUM 9.9 01/10/2025    PHOS 4.0 01/10/2025    ALBUMIN 4.5 01/10/2025      Lab Results   Component Value Date    HGBA1C 7.5 (A) 12/17/2024     Last Cardiology Tests:  ECG:  ECG 12 Lead   NSR    Echo:  TTE 12/31/2024   1. The left ventricular systolic function is normal, with a Tyler's biplane calculated ejection fraction of 67%.   2. No regional left ventricular wall motion abnormalities.   3. Spectral Doppler shows a Grade I (impaired relaxation pattern) of left ventricular diastolic filling with normal left atrial filling pressure.   4. There is normal right ventricular global systolic function.   5. Aortic valve sclerosis.    Cath:  No results found for this or any previous visit from the past 1095 days.    Stress Test:  Nuclear exercise stress 2/04/2025   1. Adequate level of stress achieved.   2. No clinical or electrocardiographic evidence for ischemia at a maximal workload.   3. Nuclear image results:  1. Normal stress myocardial perfusion imaging.  2. Well-maintained left ventricular function.  82%    Nuclear Stress Test 06/30/2023  1. Normal stress myocardial perfusion imaging.  2. Well-maintained left ventricular function.  80%    Nuclear stress test 4/13/2021  1.  Normal myocardial perfusion study without evidence of ischemia or  prior infarction.  2. The left ventricle is normal in size.  3. Normal LV wall motion with an LV EF estimated at greater than 65%.    Cardiac Imaging:  Renal duplex 6/5/2024  Right Renal Artery: Right renal arteries demonstrate no evidence of hemodynamically significant stenosis. The right renal vein is widely patent.  Left Renal Artery: Left  renal arteries demonstrate no evidence of hemodynamically significant stenosis. The left renal vein is widely patent. Left renal artery stent appears patent.    Renal duplex 11/1/2023  Right Renal Artery: Right renal arteries demonstrate no evidence of hemodynamically significant stenosis. The right renal vein is widely patent.  Left Renal Artery: At the mid left renal artery there are increased velocities that are consistent with greater than 60% stenosis. The left renal vein is widely patent. Increased renal artery velocity may be due to vessel tortuousity. Renal artery stent appears patent.    Renal duplex 6/21/2023  Right Renal Artery: Right renal arteries demonstrate no evidence of hemodynamically significant stenosis. The right renal vein is widely patent.  Left Renal Artery: At the mid left renal artery there are increased velocities that are consistent with greater than 60% stenosis. The left renal vein is widely patent. Distal renal artery demonstrates turbulent flow. Left renal artery stent appears patent.    Renal duplex 11/11/2022  Right Renal Artery: Right renal arteries demonstrate no evidence of hemodynamically significant stenosis. The right renal veins are widely patent.  Left Renal Artery: Left renal arteries demonstrate no evidence of hemodynamically significant stenosis. The left renal veins are widely patent. Left renal artery stent is noted and apears to be patent.    Renal duplex 8/12/2022  Right Renal Artery: Right renal arteries demonstrate no evidence of hemodynamically significant stenosis. The right renal vein is widely patent.  Left Renal Artery: The left renal vein is widely patent. Left renal artery and renal artery stent appears patent. A stenosis is noted in the left renal artery stent with distal turbulent flow.  Comparison:  Compared with study from 11/15/2021, no significant change.S/p left renal artery stent. Increased velocity in left renal artery stent.    Renal duplex  11/15/2021  Right Renal Artery: Right renal arteries demonstrate no evidence of hemodynamically significant stenosis. The right renal veins are widely patent.  Left Renal Artery: At the origin of the left renal artery there are increased velocities that are consistent with greater than 60% stenosis. The left renal vein is widely patent.    CT calcium score 1/20/2021  LM 0,  .3,  LCx 0,  .7,  Total 797    Carotid ultrasound 7/21/2020  Small bilateral carotid bulb atheromatous plaques without evidence  for hemodynamically significant carotid luminal stenosis    Carotid duplex 5/31/2017  Right Carotid: <50% stenosis of the right ICA. Right external carotid artery appears patent with no evidence of stenosis.   Left Carotid: <50% stenosis of the left ICA. Left external carotid artery appears patent with no evidence of stenosis.     Carotid duplex 3/18/2013  Small bilateral carotid bulb plaques without evidence for   hemodynamically significant carotid luminal stenoses bilaterally.    I have personally reviewed most recent PCP, cardiology, vascular, studies and/or documentation.      Assessment/Plan   CAD, elevated CT calcium score of 797 Agatston units with primary disease in LAD and RCA regions (1/20/2021). She did have some atypical chest discomfort in January of this year, subsequently underwent repeat a nuclear stress test this June that was essentially normal, however she had again mentioned chest discomfort to her pcp who ordered repeat stress test.  Her repeat stress test came back normal (2/4/2025).  Continue aspirin and high intensity statin therapy.      HTN, excellent, /68 today. She is on amlodipine 10 mg daily, carvedilol 25 mg bid, and hydrochlorothiazide 12.5 mg daily. She does regularly check her BP at home.     HLD, 9/10/2024 LDL 60, HDL 42.4, trig 168, she is on lovastatin 40 mg daily. Goal LDL <70. Repeat labs with PCP.     T2DM, stable, A1C 7.5 (12/17/24). This is being followed by  her PCP.      Aortic valve sclerosis, mild with trace regurgitations note on most recent echo (12/31/24).     Renal artery stenosis, high-grade ostial left side lesion s/p PCI to the left renal artery.  Most recent renal duplex showing white repeatedly open stent (6/5/24).  We can continue to do surveillance with annual renal duplex. Continue aspirin and statin therapy.    CKD 3B, most recent RFP BUN 23 and Cr 1.46 (1/10/25), baseline Cr 1.4-1.5. She is being followed by nephrology for this.    Follow up with Dr. Cook as scheduled.    Sindhu Guerrero, APRN-CNP

## 2025-04-04 NOTE — PROGRESS NOTES
Patient ID: Eleonora Varela is a 79 y.o. female who presents for Follow-up (Pt here for follow up and review. Pt reports that a week ago, she was crossing her legs, and she felt something painful in her left knee. She was having a hard time walking and went to the chiropractor and he worked on it and she used biofreeze and Tylenol and it feels much better now and she was even able to swim yesterday. Otherwise no complaints. )    (LAST VISIT PLAN FROM: 12/17/2024:  She has cardiac risk and could have atypical symptoms, will schedule stress test.  See patient instructions in wrap up plan, orders and comments for treatment plan.  Patient Instructions:  Schedule stress test, nuclear in cardiology. Also schedule echocardiogram.  Call if recurrent chest pain.  Blood and urine test soon or today. At the lab. Copies to Dr Palacios.  Follow up 3-4 months  END LAST VISIT PLAN)  -------------------------------------------  HPI  Patient is a 79-year-old female who presents today for follow up.    Since last visit, patient underwent Echocardiogram and Nuclear Stress Test (see Objective). I reviewed and discussed her results.    Left Knee Pain:  Patient reports that a week ago, she was crossing her legs, and she felt something painful in her left knee. She was having a hard time walking and went to the chiropractor, and he worked on it.  She used Biofreeze and Tylenol with improvement.  She was able to swim yesterday. She states her pain level was a 12 when it happened but today is a 1.  She is not feeling pain, currently.    Hypertension:  BP today is 114/50.  Follow up on cardiovascular conditions:  Patient follows with Cardiology, Dr. Cook, with upcoming follow up on 05/13/2025. Patient continues on amlodipine 10 mg daily, aspirin 81 mg daily, carvedilol 25 mg X2 daily, and hydrochlorothiazide 12.5 mg daily.    Cardiac:   Chest pain?No  Palpitations? No  Increased snyder?  No  Other:  Resp:   Shortness of breath?No  Wheezing  No  Cough No  Other:  Extremities:   Leg or ankle swelling?No   Claudication?No  Other:  Neuro:  DizzinessNo  SyncopeNo   Blurred visionNo  New headaches No  Other:  Medications:Taking them regularly.Yes  Side effects.No    Hyperlipidemia:  No myalgias, nausea, abdominal pain.  Meds: Taking regularly, no adverse effect.  Patient continues on fluvastatin 40 mg daily and Zetia 10 mg daily.  Labs: Last LDL direct was 78 mg/dL on 09/10/2024.  LDL goal: < 70 mg/dL.  Coronary artery calcium score of 797 on 01/29/2021.  ASCVD risk 39.1%.    Diabetes:  Type II:  A1c today was 7.6%.  Is taking medications regularly, denies side effects.  Patient continues on Lantus 40 units daily and Humalog 12 units after each meal except for swim days where she will take 10 units after breakfast and then 12 units after lunch and dinner.  Denies hypoglycemia, polyuria, polydipsia.  No new numbness or paresthesias of extremities. No syncope or dizziness. No blurred vision.  Lab Results   Component Value Date    HGBA1C 7.6 (A) 04/09/2025    HGBA1C 7.5 (A) 12/17/2024    HGBA1C 7.5 (A) 09/16/2024     Lab Results   Component Value Date    LDLCALC 60 09/10/2024    CREATININE 1.46 (H) 01/10/2025      Lab Results   Component Value Date    GLUCOSE 177 (H) 01/10/2025    CALCIUM 9.9 01/10/2025     01/10/2025    K 4.0 01/10/2025    CO2 31 01/10/2025     01/10/2025    BUN 23 01/10/2025    CREATININE 1.46 (H) 01/10/2025      Encounter Date: 10/29/24   ECG 12 lead (Clinic Performed)   Result Value    Ventricular Rate 73    Atrial Rate 73    OH Interval 170    QRS Duration 80    QT Interval 378    QTC Calculation(Bazett) 416    P Axis -21    R Axis -23    T Axis 73    QRS Count 12    Q Onset 223    P Onset 138    P Offset 188    T Offset 412    QTC Fredericia 403    Narrative    Normal sinus rhythm  Low voltage QRS  Inferior infarct , age undetermined  Cannot rule out Anterior infarct (cited on or before 04-MAR-2024)  Abnormal ECG  When  compared with ECG of 04-MAR-2024 08:34,  Inferior infarct is now Present  Confirmed by Ahsan Cook ([id number]) on 10/31/2024 9:04:01 PM      GLP-1 agonists (Ozempic 2 mg injection weekly): No nausea, vomiting, abdominal pain and anterior neck pain/swelling.    SGLT-2 inhibitors (Jardiance 25 mg daily): No hematuria, dysuria, or yeast infections.     Patient just switched to a Free Style Cory 3 CGM.    Patient follows with [facility], Dr. Yi, with upcoming appointment 06/10/2025.    Patient inquired about how someone would go about obtaining a handicapped sticker for her friend.  We discussed that she would need a prescription from me and would have to obtain a sticker from the [employer] for a small fee.        Recommended Covid booster Fall 2025 and Prevnar 20 or Prevnar 21 within the next 6 months.    Patient will call our office after she sees Dr. Yi and completes his required blood work, and I will order any blood work required after I see what he has ordered to be completed prior to her next follow up.    Follow up 08/18/2025 for Annual Wellness, Annual Physical, and follow up.    Review of Systems  See ROS in HPI    Current Outpatient Medications   Medication Instructions    amLODIPine (NORVASC) 10 mg, oral, Daily    aspirin 81 mg EC tablet 1 tablet, Daily    blood-glucose meter (OneTouch Ultra2 Meter) misc Use as directed to monitor blood glucose once daily    blood-glucose sensor (FreeStyle Cory 3 Plus Sensor) device Apply 1 sensor to the back of the upper arm every 15 days. Change sensor every 15 days.    calcium carb/vit D3/minerals (CALCIUM 600 + MINERALS ORAL) 1,200 mg, Daily    carvedilol (COREG) 25 mg, oral, 2 times daily (morning and late afternoon)    cholecalciferol (VITAMIN D-3) 25 mcg, Daily    CRANBERRY ORAL 1 tablet, 2 times daily    cyanocobalamin, vitamin B-12, 1,000 mcg tablet, sublingual Place under the tongue. one tab sublingual daily five days per week, monday  thru friday    diabetic supplies, miscellan. misc Onetouch ultrasoft lancets misc    diclofenac sodium (VOLTAREN) 2 g, Topical, 2 times daily PRN, Can increase to three times per day for up to 10 days if needed.    empagliflozin (Jardiance) 25 mg TAKE 1 TABLET BY MOUTH EVERY MORNING    ezetimibe (ZETIA) 10 mg, oral, Daily, DAILY    fluvastatin (LESCOL) 40 mg, oral, Nightly    FreeStyle Cory 3 [place] misc Use as instructed to monitor blood glucose four times daily.    FreeStyle Cory sensor system (FreeStyle Cory 2 Sensor) kit Use as instructed    hydroCHLOROthiazide (HYDRODIURIL) 12.5 mg, oral, Daily, DAILY    insulin glargine (Lantus Solostar U-100 Insulin) 100 unit/mL (3 mL) pen Inject 40 units under the skin once daily. Take as directed per insulin instructions.    insulin lispro (HUMALOG) 12 Units, subcutaneous, 3 times daily (morning, midday, late afternoon), Except on swim days, 10 units am and then 12 units at lunch and supper.Take as directed per insulin instructions    lancets (MICROLET LANCET MISC) Test sugar 6 times a day    loratadine (CLARITIN REDITABS) 10 mg, Daily    MAGNESIUM CITRATE ORAL 1 tablet, Nightly    meclizine (ANTIVERT) 12.5 mg, oral, 3 times daily PRN    omega-3/dha/epa/fish oil (OMEGA-3 ORAL) 1 capsule, 2 times daily    OneTouch Ultra Test strip Use as directed once daily to test blood glucose.    Ozempic 2 mg, subcutaneous, Once Weekly    psyllium (Metamucil) 3.4 gram packet Nightly     Allergies   Allergen Reactions    Propoxyphene N-Acetaminophen Drowsiness    Adhesive Tape-Silicones Other     Steristrips - takes skin off    Cefaclor Palpitations    Latex Rash     Objective    The following test results have been reviewed:  Nuclear Stress Test 02/04/2025:  IMPRESSION:  1. Normal stress myocardial perfusion imaging.  2. Well-maintained left ventricular function.  82%  Signed by: Kelsie Murphy 2/4/2025 4:19 PM  Dictation workstation:   [id number]    Echocardiogram  12/31/2024:  CONCLUSIONS:   1. The left ventricular systolic function is normal, with a Tyler's biplane calculated ejection fraction of 67%.   2. No regional left ventricular wall motion abnormalities.   3. Spectral Doppler shows a Grade I (impaired relaxation pattern) of left ventricular diastolic filling with normal left atrial filling pressure.   4. There is normal right ventricular global systolic function.   5. Aortic valve sclerosis.     Latest Complete Lab Results:  CBC:   Lab Results   Component Value Date    WBC 8.2 12/17/2024    RBC 5.49 (H) 12/17/2024    HGB 16.2 (H) 12/17/2024    HCT 48.1 (H) 12/17/2024    MCV 88 12/17/2024    MCH 29.5 12/17/2024    MCHC 33.7 12/17/2024     12/17/2024     CMP:  Lab Results   Component Value Date    GLUCOSE 177 (H) 01/10/2025     01/10/2025    K 4.0 01/10/2025     01/10/2025    CO2 31 01/10/2025    ANIONGAP 13 01/10/2025    BUN 23 01/10/2025    CREATININE 1.46 (H) 01/10/2025    GFRF 37 (A) 09/16/2023    CALCIUM 9.9 01/10/2025    ALBUMIN 4.5 01/10/2025    ALKPHOS 75 05/23/2024    PROT 6.9 05/23/2024    PROT 6.9 05/23/2024    AST 22 09/10/2024    BILITOT 1.1 05/23/2024    ALT 25 09/10/2024      Lipid:   Component  Ref Range & Units 3 mo ago  (1/10/25) 3 mo ago  (12/17/24) 10 mo ago  (5/23/24) 10 mo ago  (5/23/24) 1 yr ago  (1/12/24) 1 yr ago  (12/16/23) 1 yr ago  (12/4/23)   Glucose  74 - 99 mg/dL 177 High  233 High  108 High   71 Low  233 High  211 High    Sodium  136 - 145 mmol/L 141 138 138  141 140 140   Potassium  3.5 - 5.3 mmol/L 4.0 4.1 3.8  4.1 4.3 3.9   Chloride  98 - 107 mmol/L 101 98 99  99 99 101   Bicarbonate  21 - 32 mmol/L 31 33 High  29  31 30 33 High    Anion Gap  10 - 20 mmol/L 13 11 14  15 15 10   Urea Nitrogen  6 - 23 mg/dL 23 23 18  27 High  29 High  28 High    Creatinine  0.50 - 1.05 mg/dL 1.46 High  1.62 High  1.42 High   1.50 High  1.59 High  1.49 High    eGFR  >60 mL/min/1.73m*2 37 Low  32 Low  CM 38 Low  CM  36 Low  CM 33 Low   "CM 36 Low  CM     LDL Direct:  Lab Results   Component Value Date    LDLDIRECT 78 09/10/2024      TSH:   Lab Results   Component Value Date    TSH 2.43 09/10/2024      B12:  Lab Results   Component Value Date    CVNXBULB28 839 09/10/2024     Vitamin D:  Lab Results   Component Value Date    VITD25 28 (L) 01/10/2025      HgA1c:   Lab Results   Component Value Date    HGBA1C 7.6 (A) 04/09/2025    [id number] 174 03/18/2024       Physical Exam  Vitals:      10/29/2024     9:59 AM 11/4/2024     8:52 AM 11/18/2024     9:25 AM 12/17/2024     1:40 PM 12/31/2024     8:26 AM 3/6/2025     8:00 AM 4/9/2025    11:17 AM   Vitals   Systolic 118  118 118 143 111 114   Diastolic 68  73 50 77 68 50   BP Location Right arm  Right arm  Left arm Left arm    Heart Rate 76  80 72 81 77 60   Resp    16   18   Height 1.511 m (4' 11.5\") 1.511 m (4' 11.5\") 1.499 m (4' 11\") 1.511 m (4' 11.5\") 1.511 m (4' 11.5\") 1.511 m (4' 11.5\") 1.511 m (4' 11.5\")   Weight (lb) 146.6 145 148 147 148 150 159   BMI 29.11 kg/m2 28.8 kg/m2 29.89 kg/m2 29.19 kg/m2 29.39 kg/m2 29.79 kg/m2 31.58 kg/m2   BSA (m2) 1.67 m2 1.66 m2 1.67 m2 1.67 m2 1.68 m2 1.69 m2 1.74 m2     Patient looks their usual self, is known to me, and is in no obvious distress.  HEENT:   Normocephalic, no facial asymmetry  Eyes: Sclera and conjunctiva are clear.  Neck: No adenopathy cervical (Ant/post/lat), no Supraclavicular nodes.   Thyroid normal.  Carotid pulses normal, no carotid bruits.  Lungs : RR normal. Clear to auscultation anterior, posterior and lateral. No rales, wheezes rhonchi or rubs. Good air exchange.  Heart: RRR. No gallop, click or rub.  Patient has a 1/6 systolic murmur hear URSB and ULSB.  Abdomen: Bowel sounds normal, No bruits. No pulsatile mass. No hepatosplenomegaly, masses or tenderness. Soft, no guarding.  Vascular:  Posterior tibialis and dorsalis pedis pulses within normal limits bilaterally.   Extremities: No upper extremity edema. No lower extremity edema. "   Musculoskeletal: No synovitis of joints seen. No new deformity.  Swelling of medial right knee, consistent with bursitis.  No warmth or redness noted.  Knee is stable.  Recommended alternating with ice and Voltaren gel 2 grams twice daily for 1-2 weeks with increase to three times per day if needed up to 7 days maximum and wearing a patella brace with or without stays.  Advised to ice after activity.  Advised to call if symptoms worsen.  Neuro: CN 2-12 intact. Alert, appropriate.  Ambulates independently.  No gross motor deficit.   No tremors.  Psych: normal mood and affect.  Skin: No rash, bruising petechiae or jaundice.  Eleonora was seen today for follow-up.  Diagnoses and all orders for this visit:  Essential hypertension (Primary)  Hyperlipidemia, unspecified hyperlipidemia type  Aspirin long-term use  Type 2 diabetes mellitus with insulin therapy (Multi)  -     POCT glycosylated hemoglobin (Hb A1C) manually resulted  -     insulin lispro (HumaLOG) 100 unit/mL pen; Inject 12 Units under the skin 3 times daily (morning, midday, late afternoon). Except on swim days, 10 units am and then 12 units at lunch and supper.Take as directed per insulin instructions  Knee strain, left, initial encounter  -     diclofenac sodium (Voltaren) 1 % gel; Apply 2.25 inches (2 g) topically 2 times a day as needed (left knee swelling). Can increase to three times per day for up to 10 days if needed.  Bursitis of other bursa of left knee  -     diclofenac sodium (Voltaren) 1 % gel; Apply 2.25 inches (2 g) topically 2 times a day as needed (left knee swelling). Can increase to three times per day for up to 10 days if needed.  Immunization counseling    Knee: Recent strain:Avoid oral nsaids in face of ckd. Ice and topical nsaid at different times, call if worse  Other issues stable, dm2 discussed.    See patient instructions in wrap up plan, orders and comments for treatment plan.  Patient Instructions:  Covid vaccine booster definitely  by fall, watch for trends at health department.If incidence rises, get vaccine. You are ligible to get one anytime now since it has been more than 6 months since the last.      Recommend Prevnar 20 or Prevnar 21 in the next 6 months.  Your prevnar 13 was in December of 2015 and your last pneumovax 23 was march of 2020. This is vaccination against pneumococcal pneumonia.    Voltaren gel 2 grams to left knee twice daily regularly for 1-2 weeks could increase it to three times daily if needed but only for 7 days at that dosing.  Ice after activity. Call if worse.    Follow up 4 months wellness, annual physical, follow up.  Fasting blood test before that visit. But call 2 weeks before and will see what everyone else has done and then add what is still needed.         I am the Internal Medicine physician providing ongoing chronic medical care for this patient, which is managed during and in between office visits.    Scribe Attestation  By signing my name below, ITeresa, Scribe   attest that this documentation has been prepared under the direction and in the presence of Jennifer Kenney MD.

## 2025-04-09 ENCOUNTER — APPOINTMENT (OUTPATIENT)
Dept: PRIMARY CARE | Facility: CLINIC | Age: 80
End: 2025-04-09
Payer: MEDICARE

## 2025-04-09 VITALS
DIASTOLIC BLOOD PRESSURE: 50 MMHG | SYSTOLIC BLOOD PRESSURE: 114 MMHG | RESPIRATION RATE: 18 BRPM | HEIGHT: 60 IN | BODY MASS INDEX: 29.64 KG/M2 | HEART RATE: 60 BPM | WEIGHT: 151 LBS

## 2025-04-09 DIAGNOSIS — M70.52 BURSITIS OF OTHER BURSA OF LEFT KNEE: ICD-10-CM

## 2025-04-09 DIAGNOSIS — E11.9 TYPE 2 DIABETES MELLITUS WITH INSULIN THERAPY (MULTI): ICD-10-CM

## 2025-04-09 DIAGNOSIS — S86.912A KNEE STRAIN, LEFT, INITIAL ENCOUNTER: ICD-10-CM

## 2025-04-09 DIAGNOSIS — Z79.82 ASPIRIN LONG-TERM USE: ICD-10-CM

## 2025-04-09 DIAGNOSIS — I10 ESSENTIAL HYPERTENSION: Primary | ICD-10-CM

## 2025-04-09 DIAGNOSIS — Z71.85 IMMUNIZATION COUNSELING: ICD-10-CM

## 2025-04-09 DIAGNOSIS — E78.5 HYPERLIPIDEMIA, UNSPECIFIED HYPERLIPIDEMIA TYPE: ICD-10-CM

## 2025-04-09 DIAGNOSIS — Z79.4 TYPE 2 DIABETES MELLITUS WITH INSULIN THERAPY (MULTI): ICD-10-CM

## 2025-04-09 LAB — POC HEMOGLOBIN A1C: 7.6 % (ref 4.2–6.5)

## 2025-04-09 PROCEDURE — 3078F DIAST BP <80 MM HG: CPT | Performed by: INTERNAL MEDICINE

## 2025-04-09 PROCEDURE — 1159F MED LIST DOCD IN RCRD: CPT | Performed by: INTERNAL MEDICINE

## 2025-04-09 PROCEDURE — 3074F SYST BP LT 130 MM HG: CPT | Performed by: INTERNAL MEDICINE

## 2025-04-09 PROCEDURE — 1160F RVW MEDS BY RX/DR IN RCRD: CPT | Performed by: INTERNAL MEDICINE

## 2025-04-09 PROCEDURE — G2211 COMPLEX E/M VISIT ADD ON: HCPCS | Performed by: INTERNAL MEDICINE

## 2025-04-09 PROCEDURE — 1125F AMNT PAIN NOTED PAIN PRSNT: CPT | Performed by: INTERNAL MEDICINE

## 2025-04-09 PROCEDURE — 1157F ADVNC CARE PLAN IN RCRD: CPT | Performed by: INTERNAL MEDICINE

## 2025-04-09 PROCEDURE — 1123F ACP DISCUSS/DSCN MKR DOCD: CPT | Performed by: INTERNAL MEDICINE

## 2025-04-09 PROCEDURE — 83036 HEMOGLOBIN GLYCOSYLATED A1C: CPT | Performed by: INTERNAL MEDICINE

## 2025-04-09 PROCEDURE — 1158F ADVNC CARE PLAN TLK DOCD: CPT | Performed by: INTERNAL MEDICINE

## 2025-04-09 PROCEDURE — 99214 OFFICE O/P EST MOD 30 MIN: CPT | Performed by: INTERNAL MEDICINE

## 2025-04-09 RX ORDER — INSULIN LISPRO 100 [IU]/ML
12 INJECTION, SOLUTION INTRAVENOUS; SUBCUTANEOUS
Status: SHIPPED
Start: 2025-04-09

## 2025-04-09 RX ORDER — DICLOFENAC SODIUM 10 MG/G
2 GEL TOPICAL 2 TIMES DAILY PRN
Qty: 100 G | Refills: 1 | Status: SHIPPED | OUTPATIENT
Start: 2025-04-09

## 2025-04-09 ASSESSMENT — PAIN SCALES - GENERAL: PAINLEVEL_OUTOF10: 1

## 2025-04-09 NOTE — PATIENT INSTRUCTIONS
Covid vaccine booster definitely by fall, watch for trends at health department.If incidence rises, get vaccine. You are ligible to get one anytime now since it has been more than 6 months since the last.      Recommend Prevnar 20 or Prevnar 21 in the next 6 months.  Your prevnar 13 was in December of 2015 and your last pneumovax 23 was march of 2020. This is vaccination against pneumococcal pneumonia.    Voltaren gel 2 grams to left knee twice daily regularly for 1-2 weeks could increase it to three times daily if needed but only for 7 days at that dosing.  Ice after activity. Call if worse.    Follow up 4 months wellness, annual physical, follow up.  Fasting blood test before that visit. But call 2 weeks before and will see what everyone else has done and then add what is still needed.

## 2025-04-09 NOTE — PROGRESS NOTES
Eleonora called in and stated:    She had an appt today with Dr Kenney, she weighed herself at home and she is 151 lbs not 159 lbs that was taken at the appt this morning.

## 2025-04-11 ENCOUNTER — APPOINTMENT (OUTPATIENT)
Dept: PHARMACY | Facility: HOSPITAL | Age: 80
End: 2025-04-11
Payer: MEDICARE

## 2025-04-11 DIAGNOSIS — E11.9 TYPE 2 DIABETES MELLITUS WITH INSULIN THERAPY (MULTI): ICD-10-CM

## 2025-04-11 DIAGNOSIS — Z79.4 TYPE 2 DIABETES MELLITUS WITH INSULIN THERAPY (MULTI): ICD-10-CM

## 2025-04-11 PROCEDURE — RXMED WILLOW AMBULATORY MEDICATION CHARGE

## 2025-04-11 RX ORDER — SEMAGLUTIDE 2.68 MG/ML
2 INJECTION, SOLUTION SUBCUTANEOUS
Qty: 9 ML | Refills: 3 | Status: SHIPPED | OUTPATIENT
Start: 2025-04-13

## 2025-04-11 RX ORDER — PEN NEEDLE, DIABETIC 30 GX3/16"
NEEDLE, DISPOSABLE MISCELLANEOUS
Qty: 400 EACH | Refills: 3 | Status: SHIPPED | OUTPATIENT
Start: 2025-04-11

## 2025-04-11 NOTE — ASSESSMENT & PLAN NOTE
Above goal with most recent A1c of 7.6% on 4/9/2025 consistent with previous A1c of  7.5% on 12/17/2024 and 9/16/2024 (Goal < 7%). Freestyle Cory 3 connected to reader device on 4/5/2025. First sensor did not connect properly, replacement sensor received from [employer]. Time in target since sensor placement is 64% with an average glucose of 170 mg/dL. 1 reported low glucose event noted on reader device. Fingerstick glucose >70 mg/dL. Advised to continue current regimen at this time. Patient unable to connect her Cory 3 to her smart phone due to technology deficits. She will continue with the reader at this time. Follow up after [facility] visit.

## 2025-04-11 NOTE — PROGRESS NOTES
Patient is sent at the request of Alma Pavon MD for my opinion regarding Type 2 diabetes.      My final recommendations will be communicated back to the requesting provider by way of shared medical record.    Subjective     Eleonora Varela is a 79 y.o. year old female patient with type two diabetes mellitus, CKD G3b/A1, hypertension, dyslipidemia, and left renal artery stent for POONAM (2022). Patient follows with Dr. Pavon through the [facility] for cardiometabolic risk factor modification. She remains on basal/bolus insulin therapy, Ozempic 2 mg and Jardiance 25 mg once daily. She is connected to her FSL reader device. At last visit I advised her to continue with 40 units of basal insulin along with a meal-time dosing of 12-12-12. She remains on Ozempic 2 mg and Jardiance 25 mg daily. Today we are discussing her glycemic control with these changes.      Past Medical History:  She has a past medical history of Abnormal radiologic findings on diagnostic imaging of right kidney, Abnormal radiologic findings on diagnostic imaging of right kidney, Acute kidney failure, unspecified (08/22/2020), BPV (benign positional vertigo) (01/20/2023), Carpal tunnel syndrome, right upper limb (01/24/2017), Chest pain, unspecified (03/01/2022), Contact with and (suspected) exposure to covid-19 (08/14/2021), Contact with and (suspected) exposure to other bacterial communicable diseases (02/10/2022), Elevation of levels of liver transaminase levels (11/29/2022), Encounter for gynecological examination (general) (routine) without abnormal findings (10/19/2015), Localized edema (08/15/2022), Neuropathy, 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 (01/20/2023), Nonspecific lymphadenitis, unspecified (07/02/2019), Osteoarthritis of spine with radiculopathy, cervical region (08/29/2014), Other chest pain (05/10/2021), Other conditions influencing health status (02/11/2019), Other long term (current) drug therapy (12/03/2020), Other microscopic hematuria  (02/01/2016), Other specified cardiac arrhythmias (07/31/2020), Other specified symptoms and signs involving the circulatory and respiratory systems (03/01/2022), Other symptoms and signs involving the musculoskeletal system (04/29/2014), Pain in right leg (02/05/2018), Personal history of colonic polyps (10/13/2014), Personal history of diseases of the skin and subcutaneous tissue (02/13/2019), Personal history of other diseases of the respiratory system (03/01/2022), Personal history of other drug therapy, Personal history of other drug therapy (10/20/2016), Personal history of other endocrine, nutritional and metabolic disease (11/29/2022), Personal history of other endocrine, nutritional and metabolic disease (11/29/2022), Pleurodynia (03/01/2022), Strain of unspecified muscle(s) and tendon(s) at lower leg level, right leg, initial encounter (12/05/2022), Toxic effect of venom of wasps, accidental (unintentional), initial encounter (08/10/2021), and Type 2 diabetes mellitus with diabetic cataract (03/10/2020).    Past Surgical History:  She has a past surgical history that includes Other surgical history (03/29/2021); Other surgical history (03/29/2021); Other surgical history (11/29/2022); Other surgical history (05/10/2021); Other surgical history (05/11/2022); Carpal tunnel release (10/13/2014); Other surgical history (10/13/2014); Incisional breast biopsy (Right); and Breast biopsy (Right).    Social History:  She reports that she has never smoked. She has never been exposed to tobacco smoke. She has never used smokeless tobacco. She reports that she does not currently use alcohol. She reports that she does not use drugs.    Family History:  Family History   Problem Relation Name Age of Onset    Prostate cancer Father      Diabetes type II Father      Breast cancer Sister Kanwal     Diabetes type II Sister Kanwal     Breast cancer Sister  62    Other (acute myocardial infraction) Brother  59        2021     Testicular cancer Brother      Other (herion addiction) Daughter      Other (alcohol dependence) Daughter      Drug abuse Daughter      Other (Large b cell lymphoma of lymph nodes on neck) Maternal Grandfather      No Known Problems Father's Brother      Other (diffuse large b cell lymphoma of lymph nodes of neck) Half-Sister       Allergies:  Propoxyphene n-acetaminophen, Adhesive tape-silicones, Cefaclor, and Latex    DIABETES MELLITUS TYPE 2:      Current diabetic medications include:  Lantus solostar 100 units/mL: Inject 40 units subcutaneous once daily AM   Humalog KwikPen 100 units/mL: 12-12-12 units TID AC   Ozempic 2 mg inject 2 mg under the skin once a week on   Jardiance 25 mg: Take one tablet by mouth once daily     Previous Pharmacotherapy   Humulin N insulin   Metformin - d/c'ed by Dr. Shelley in     Adverse Effects: None    Glucose Readings:  Glucometer/CGM Type: Cory 3 - First sensor placed on 2025; Replacement sensor sent due error with first sensor   Cory 3 sensor placement: 2025   Glucometer: Onetouch Ultra 2   Smartphone: Vivocha   Connected to LibrLooking for Gamersiew: No     CGM Data - 2025 (Cory 3)    Time in Target   7 Day  Above Target: 36%  In Target: 64%  Below Target: 0% 14 day  Above Target: N/A  In Target: N/A  Below Target: N/A   30 days  Above Target: N/A  In Target: N/A  Below Target: N/A   Average glucose    Sensor Usage Low Glucose Events   7 days average: 170 mg/dL   7 Days:  Views per day: 17  Sensor data captured: 83% 7 Days: 1       CGM Data - 2025    Freestyle Cory 2 sensors remainin     Time in Target   7 Day  Above Target: 46%  In Target: 54%  Below Target: 0% 14 day  Above Target: 60%  In Target: 40%  Below Target: 0%   30 days  Above Target: 64%  In Target: 36%  Below Target: 0%   Average glucose    Sensor Usage Low Glucose Events   7 days average: 164 mg/dL  12 am to 6 am 176 mg/dL  6 am to 12 pm 155 mg/L  12 to 6 pm 142 mg/dL  6 pm to 12 am 184  mg/dL    14 day average: 178 mg/dL  12 am to 6 am 179 mg/dL  6 am to 12 pm 172 mg/L  12 to 6 pm 163 mg/dL  6 pm to 12 am 199 mg/dL    30 day average: 187 mg/dL  12 am to 6 am 192 mg/dL  6 am to 12 pm 181 mg/L  12 to 6 pm 166 mg/dL  6 pm to 12 am 209 mg/dL No reported missed days of sensor usage  7 Days: 0  14 Days: 1 - Between 12 p and 6 p  30 Days: 0       CGM Data - 2/7/2025  Time in Target   7 Day  Above Target: 62%  In Target: 37%  Below Target: 1% 14 day  Above Target: 61%  In Target: 38%  Below Target: 1%   30 days  Above Target: 74%  In Target: 26%  Below Target: 0%   Average glucose    Sensor Usage Low Glucose Events   7 days average: 184 mg/dL  12 am to 6 am 197 mg/dL  6 am to 12 pm 188 mg/L  12 to 6 pm 148 mg/dL  6 pm to 12 am 200 mg/dL    14 day average: 180 mg/dL  12 am to 6 am 181 mg/dL  6 am to 12 pm 187 mg/L  12 to 6 pm 162 mg/dL  6 pm to 12 am 191 mg/dL    30 day average: 209 mg/dL  12 am to 6 am 209 mg/dL  6 am to 12 pm 210 mg/L  12 to 6 pm 190 mg/dL  6 pm to 12 am 228 mg/dL   Not discussed - No reported missed days of sensor usage  7 Days: 1  2/6/2025: 67 mg/dL at 4:03 pm   2/6/2025: 78 mg/dL at 4:20 pm     14 Days: 0    30 Days: 0       CGM Data - 1/24/2025    Time in Target   7 Day  Above Target: 85%  In Target: 15%  Below Target: 0% 14 day  Above Target: 83%  In Target: 17%  Below Target: 0%   30 days  Above Target: 73%  In Target: 27%  Below Target: 0%   Average glucose    Sensor Usage Low Glucose Events   7 days average: 238 mg/dL    12 am to 6 am 241 mg/dL  6 am to 12 pm 238 mg/L  12 to 6 pm 213 mg/dL  6 pm to 12 am 261 mg/dL    14 day average: 233 mg/dL    12 am to 6 am 233 mg/dL  6 am to 12 pm 225 mg/L  12 to 6 pm 217 mg/dL  6 pm to 12 am 259 mg/dL    30 day average: 211 mg/dL    12 am to 6 am 217 mg/dL  6 am to 12 pm 207 mg/L  12 to 6 pm 187 mg/dL  6 pm to 12 am 235 mg/dL Not discussed - No reported missed  7 Days: 0  14 Days: 0  30 Days: 0       CGM Data - 1/24/2025    Time in Target   7  Day  Above Target: 85%  In Target: 15%  Below Target: 0% 14 day  Above Target: 83%  In Target: 17%  Below Target: 0%   30 days  Above Target: 73%  In Target: 27%  Below Target: 0%   Average glucose    Sensor Usage Low Glucose Events   7 days average: 238 mg/dL    12 am to 6 am 241 mg/dL  6 am to 12 pm 238 mg/L  12 to 6 pm 213 mg/dL  6 pm to 12 am 261 mg/dL    14 day average: 233 mg/dL    12 am to 6 am 233 mg/dL  6 am to 12 pm 225 mg/L  12 to 6 pm 217 mg/dL  6 pm to 12 am 259 mg/dL    30 day average: 211 mg/dL    12 am to 6 am 217 mg/dL  6 am to 12 pm 207 mg/L  12 to 6 pm 187 mg/dL  6 pm to 12 am 235 mg/dL Not discussed - No reported missed  7 Days: 0  14 Days: 0  30 Days: 0       CGM Data - 1/3/2025  Time in Target   7 Day  Above Target: 61%  In Target: 39%  Below Target: 0% 14 day  Above Target: 57%  In Target: 43%  Below Target: 0%   30 days  Above Target: 53%  In Target: 47%  Below Target: 0%   Average glucose    Sensor Usage Low Glucose Events   7 days average: 176 mg/dL  14 day average: 177 mg/dL  30 day average: 173 mg/dL Not discussed - No reported missed  7 Days: 0  14 Days: 0  30 Days: 0       CGM Data  Time in Target   7 Day  Above Target: 64%  In Target: 36%  Below Target: 0% 14 day  Above Target: 65%  In Target: 35%  Below Target: 0%   30 days  Above Target: 64%  In Target: 36%  Below Target: 0%   Average glucose    Sensor Usage Low Glucose Events   7 days average: 193 mg/dL  14 day average: 195 mg/dL  30 day average: 191 mg/dL 7 Days:  Scans per day: 10  Sensor data captured: 90%    14 Days:  Scans per day: 11  Sensor data captured: 94%    30 Days:  Scans per day: 11  Sensor data captured: 97% 7 Days: 0  14 Days: 0  30 Days: 0       Affordability/Accessibility:   Lantus solostar cost/coverage reviewed - $0    Objective     Last Recorded Vitals:  BP Readings from Last 6 Encounters:   04/09/25 114/50   03/06/25 111/68   12/31/24 143/77   12/17/24 118/50   11/18/24 118/73   10/29/24 118/68      Wt Readings  from Last 6 Encounters:   04/09/25 68.5 kg (151 lb)   03/06/25 68 kg (150 lb)   12/31/24 67.1 kg (148 lb)   12/17/24 66.7 kg (147 lb)   11/18/24 67.1 kg (148 lb)   11/04/24 65.8 kg (145 lb)     BMI Readings from Last 6 Encounters:   04/09/25 29.99 kg/m²   03/06/25 29.79 kg/m²   12/31/24 29.39 kg/m²   12/17/24 29.19 kg/m²   11/18/24 29.89 kg/m²   11/04/24 28.80 kg/m²     Lab Review  Lab Results   Component Value Date    BILITOT 1.1 05/23/2024    CALCIUM 9.9 01/10/2025    CO2 31 01/10/2025     01/10/2025    CREATININE 1.46 (H) 01/10/2025    GLUCOSE 177 (H) 01/10/2025    ALKPHOS 75 05/23/2024    K 4.0 01/10/2025    PROT 6.9 05/23/2024    PROT 6.9 05/23/2024     01/10/2025    AST 22 09/10/2024    ALT 25 09/10/2024    BUN 23 01/10/2025    ANIONGAP 13 01/10/2025    MG 2.67 (H) 12/16/2023    PHOS 4.0 01/10/2025    ALBUMIN 4.5 01/10/2025    GFRF 37 (A) 09/16/2023     Lab Results   Component Value Date    TRIG 168 (H) 09/10/2024    CHOL 136 09/10/2024    LDLCALC 60 09/10/2024    HDL 42.4 09/10/2024     Lab Results   Component Value Date    HGBA1C 7.6 (A) 04/09/2025    HGBA1C 7.5 (A) 12/17/2024    HGBA1C 7.5 (A) 09/16/2024     Component      Latest Ref Rng 9/16/2023 12/16/2023 1/12/2024 12/17/2024   ALBUMIN (MG/L) IN URINE      Not Established mg/L 119.4       Albumin/Creatinine Ratio 82.9 (H)  13.6  40.7 (H)  --    Creatinine, Urine Random      20.0 - 320.0 mg/dL 144.0  95.7  86.8  40.3    Albumin, Urine Random      Not established mg/L  13.0  35.3  <7.0       Legend:  (H) High    The 10-year ASCVD risk score (Prince PLASCENCIA, et al., 2019) is: 39.1%    Values used to calculate the score:      Age: 79 years      Sex: Female      Is Non- : No      Diabetic: Yes      Tobacco smoker: No      Systolic Blood Pressure: 114 mmHg      Is BP treated: Yes      HDL Cholesterol: 42.4 mg/dL      Total Cholesterol: 136 mg/dL    Assessment/Plan   Problem List Items Addressed This Visit       Type 2 diabetes  "mellitus with insulin therapy (Multi)     Above goal with most recent A1c of 7.6% on 4/9/2025 consistent with previous A1c of  7.5% on 12/17/2024 and 9/16/2024 (Goal < 7%). CGM data reviewed via cory reports from her FSL2 reader. 7 day time in target improved at 54% and average glucose of 164 mg/dL. 30 day average for the reporting period is 187 mg/dL and time in target at 36%. Denies dizziness, lightheadedness, LOC. Continue therapy as prescribed except increase Lantus to 40 units daily. Begin use with Freestyle Cory 3 plus sensors. Discussed targeting an average glucose <155 mg/dL and time in target >70%. Follow up in 6 weeks          Relevant Medications    pen needle, diabetic 32 gauge x 5/32\" needle    semaglutide (Ozempic) 2 mg/dose (8 mg/3 mL) pen injector (Start on 4/13/2025)    empagliflozin (Jardiance) 25 mg tablet    Other Relevant Orders    Referral to Clinical Pharmacy     Compliance at present is estimated to be good.  Follow-up: I recommend diabetes care be 5/16/2025 at 9 am.   [facility] Follow-Up: 5/13/2025  PCP Follow-Up: 8/18/2025  Upcoming procedures/appointments: Gum surgery     Continue all meds under the continuation of care with the referring provider and clinical pharmacy team.  "

## 2025-04-11 NOTE — Clinical Note
No changes today. Please provide education with CGM connectivity to phone at [facility] follow up if possible to then connect to [facility] portal. If repeat laboratory studies are needed at this time, Sheryl can you coordinate? Thank you!

## 2025-04-14 DIAGNOSIS — E11.9 TYPE 2 DIABETES MELLITUS WITH INSULIN THERAPY (MULTI): ICD-10-CM

## 2025-04-14 DIAGNOSIS — I25.10 CORONARY ARTERY DISEASE INVOLVING NATIVE CORONARY ARTERY OF NATIVE HEART WITHOUT ANGINA PECTORIS: ICD-10-CM

## 2025-04-14 DIAGNOSIS — Z79.4 TYPE 2 DIABETES MELLITUS WITH INSULIN THERAPY (MULTI): ICD-10-CM

## 2025-04-16 ENCOUNTER — PHARMACY VISIT (OUTPATIENT)
Dept: PHARMACY | Facility: CLINIC | Age: 80
End: 2025-04-16
Payer: MEDICARE

## 2025-04-28 ENCOUNTER — TELEPHONE (OUTPATIENT)
Dept: SCHEDULING | Age: 80
End: 2025-04-28

## 2025-05-05 DIAGNOSIS — E11.9 TYPE 2 DIABETES MELLITUS WITH INSULIN THERAPY (MULTI): ICD-10-CM

## 2025-05-05 DIAGNOSIS — Z79.4 TYPE 2 DIABETES MELLITUS WITH INSULIN THERAPY (MULTI): ICD-10-CM

## 2025-05-05 RX ORDER — BLOOD-GLUCOSE SENSOR
EACH MISCELLANEOUS
Qty: 2 EACH | Refills: 11 | Status: SHIPPED | OUTPATIENT
Start: 2025-05-05

## 2025-05-13 ENCOUNTER — OFFICE VISIT (OUTPATIENT)
Dept: CARDIOLOGY | Facility: CLINIC | Age: 80
End: 2025-05-13
Payer: MEDICARE

## 2025-05-13 VITALS
HEIGHT: 59 IN | DIASTOLIC BLOOD PRESSURE: 65 MMHG | WEIGHT: 153 LBS | HEART RATE: 84 BPM | BODY MASS INDEX: 30.84 KG/M2 | SYSTOLIC BLOOD PRESSURE: 108 MMHG | OXYGEN SATURATION: 93 %

## 2025-05-13 DIAGNOSIS — E78.5 HYPERLIPIDEMIA ASSOCIATED WITH TYPE 2 DIABETES MELLITUS: ICD-10-CM

## 2025-05-13 DIAGNOSIS — E11.69 HYPERLIPIDEMIA ASSOCIATED WITH TYPE 2 DIABETES MELLITUS: ICD-10-CM

## 2025-05-13 DIAGNOSIS — Z79.4 TYPE 2 DIABETES MELLITUS WITH INSULIN THERAPY (MULTI): ICD-10-CM

## 2025-05-13 DIAGNOSIS — E11.9 TYPE 2 DIABETES MELLITUS WITH INSULIN THERAPY (MULTI): ICD-10-CM

## 2025-05-13 PROCEDURE — G2211 COMPLEX E/M VISIT ADD ON: HCPCS | Performed by: REGISTERED NURSE

## 2025-05-13 PROCEDURE — 1159F MED LIST DOCD IN RCRD: CPT | Performed by: REGISTERED NURSE

## 2025-05-13 PROCEDURE — 3074F SYST BP LT 130 MM HG: CPT | Performed by: REGISTERED NURSE

## 2025-05-13 PROCEDURE — 1036F TOBACCO NON-USER: CPT | Performed by: REGISTERED NURSE

## 2025-05-13 PROCEDURE — 1126F AMNT PAIN NOTED NONE PRSNT: CPT | Performed by: REGISTERED NURSE

## 2025-05-13 PROCEDURE — 99212 OFFICE O/P EST SF 10 MIN: CPT | Performed by: REGISTERED NURSE

## 2025-05-13 PROCEDURE — 3078F DIAST BP <80 MM HG: CPT | Performed by: REGISTERED NURSE

## 2025-05-13 RX ORDER — INSULIN LISPRO 100 [IU]/ML
12 INJECTION, SOLUTION INTRAVENOUS; SUBCUTANEOUS
Qty: 32.4 ML | Refills: 3 | Status: SHIPPED | OUTPATIENT
Start: 2025-05-13

## 2025-05-13 ASSESSMENT — COLUMBIA-SUICIDE SEVERITY RATING SCALE - C-SSRS
1. IN THE PAST MONTH, HAVE YOU WISHED YOU WERE DEAD OR WISHED YOU COULD GO TO SLEEP AND NOT WAKE UP?: NO
2. HAVE YOU ACTUALLY HAD ANY THOUGHTS OF KILLING YOURSELF?: NO
6. HAVE YOU EVER DONE ANYTHING, STARTED TO DO ANYTHING, OR PREPARED TO DO ANYTHING TO END YOUR LIFE?: NO

## 2025-05-13 ASSESSMENT — ENCOUNTER SYMPTOMS
DEPRESSION: 0
OCCASIONAL FEELINGS OF UNSTEADINESS: 0
LOSS OF SENSATION IN FEET: 0

## 2025-05-13 ASSESSMENT — PAIN SCALES - GENERAL: PAINLEVEL_OUTOF10: 0-NO PAIN

## 2025-05-13 NOTE — PROGRESS NOTES
CHIEF COMPLAINT: routine follow-up visit    HISTORY OF PRESENT ILLNESS:    PCP: Dr. Jennifer Kenney  Cardiologists: Dr. Ahsan Cook and Dr. Nj Bennett     Ms. Varela is a 77 yo F with hx as listed below who returns for follow-up visit; last seen by me in 6/2024. .Denies CP, SOB, dizziness, LE edema, or palpitations. No lows at swimming. 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 No issues with ozempic injections or side effects from jardiance. Continues to lose some weight. Has lost 30 lbs since starting ozempic.  No UTD labs.      PAST MEDICAL/SURGICAL HISTORY:  -CKD  -DM with retinopathy  -L renal artery stent for renal artery stenosis (2022): no stenosis and patent stent on surveillance US  -HTN  -DLD  -vertigo  -ASD vs PFO? (heart cath age 14)  -cataract surgery  -MGUS  -osteopenia  -carotid plaque without stenosis  -carpal tunnel surgery     PRIOR CARDIAC TESTING:  -Lexiscan (2023): no ischemia, EF 80%  -Lexiscan (2021): no ischemia  -CAC (2021): 797  -TTE (2015): EF 60-65%, impaired relaxation, aortic sclerosis, mild TR  -Treadmill stress ECG (2015): negative  -TTE (2012): EF 70%, impaired relaxation, mid-cavity obstruction 43mmHg with valsalva    Allergies   Allergen Reactions    Propoxyphene N-Acetaminophen Drowsiness    Adhesive Tape-Silicones Other     Steristrips - takes skin off    Cefaclor Palpitations    Latex Rash     Current Outpatient Medications:     amLODIPine (Norvasc) 10 mg tablet, Take 1 tablet (10 mg) by mouth once daily., Disp: 90 tablet, Rfl: 3    aspirin 81 mg EC tablet, Take 1 tablet (81 mg) by mouth once daily., Disp: , Rfl:     blood-glucose meter (OneTouch Ultra2 Meter) misc, Use as directed to monitor blood glucose once daily, Disp: 1 each, Rfl: 0    blood-glucose sensor (FreeStyle Cory 3 Plus Sensor) device, Apply 1 sensor to the back of the upper arm every 15 days. Change sensor every 15 days., Disp: 2 each, Rfl: 11    calcium carb/vit D3/minerals (CALCIUM 600 + MINERALS ORAL), Take 1,200 mg  by mouth once daily., Disp: , Rfl:     carvedilol (Coreg) 25 mg tablet, Take 1 tablet (25 mg) by mouth 2 times daily (morning and late afternoon)., Disp: 180 tablet, Rfl: 3    cholecalciferol (Vitamin D-3) 25 MCG (1000 UT) tablet, Take 1 tablet (25 mcg) by mouth once daily., Disp: , Rfl:     CRANBERRY ORAL, Take 1 tablet by mouth 2 times a day. 500 MG TABLETS, Disp: , Rfl:     cyanocobalamin, vitamin B-12, 1,000 mcg tablet, sublingual, Place under the tongue. one tab sublingual daily five days per week, monday thru friday, Disp: , Rfl:     diabetic supplies, miscellan. misc, Onetouch ultrasoft lancets misc, Disp: , Rfl:     diclofenac sodium (Voltaren) 1 % gel, Apply 2.25 inches (2 g) topically 2 times a day as needed (left knee swelling). Can increase to three times per day for up to 10 days if needed., Disp: 100 g, Rfl: 1    empagliflozin (Jardiance) 25 mg tablet, TAKE 1 TABLET BY MOUTH EVERY MORNING, Disp: 90 tablet, Rfl: 3    ezetimibe (Zetia) 10 mg tablet, Take 1 tablet (10 mg) by mouth once daily. DAILY, Disp: 90 tablet, Rfl: 3    fluvastatin (Lescol) 40 mg capsule, Take 1 capsule (40 mg) by mouth once daily at bedtime., Disp: 90 capsule, Rfl: 3    FreeStyle Cory 3 [place] misc, Use as instructed to monitor blood glucose four times daily., Disp: 1 each, Rfl: 0    hydroCHLOROthiazide (HYDRODiuril) 25 mg tablet, Take 0.5 tablets (12.5 mg) by mouth once daily. DAILY, Disp: 45 tablet, Rfl: 3    insulin glargine (Lantus Solostar U-100 Insulin) 100 unit/mL (3 mL) pen, Inject 40 units under the skin once daily. Take as directed per insulin instructions., Disp: 30 mL, Rfl: 3    lancets (MICROLET LANCET MISC), Test sugar 6 times a day, Disp: , Rfl:     loratadine (Claritin Reditabs) 10 mg disintegrating tablet, Dissolve 1 tablet (10 mg) in the mouth once daily., Disp: , Rfl:     MAGNESIUM CITRATE ORAL, Take 1 tablet by mouth at bedtime. MAGNESIUM CITRATE 200 MG, Disp: , Rfl:     meclizine (Antivert) 12.5 mg tablet,  "Take 1 tablet (12.5 mg) by mouth 3 times a day as needed for dizziness., Disp: 15 tablet, Rfl: 2    omega-3/dha/epa/fish oil (OMEGA-3 ORAL), Take 1 capsule by mouth 2 times a day. 1,200 MG 2 TIMES A DAY, Disp: , Rfl:     OneTouch Ultra Test strip, Use as directed once daily to test blood glucose., Disp: 100 each, Rfl: 3    pen needle, diabetic 32 gauge x 5/32\" needle, Use as directed four times daily with insulin injections. Change pen needle with each insulin injection., Disp: 400 each, Rfl: 3    psyllium (Metamucil) 3.4 gram packet, Take by mouth at bedtime. Dissolve 1 tsp in 8 oz of water and drink daily at bedtime and not near other medication, forconstipation, Disp: , Rfl:     semaglutide (Ozempic) 2 mg/dose (8 mg/3 mL) pen injector, Inject 2 mg under the skin 1 (one) time per week., Disp: 9 mL, Rfl: 3    insulin lispro (HumaLOG) 100 unit/mL pen, Inject 12 Units under the skin 3 times daily (morning, midday, late afternoon). Except on swim days, 10 units am and then 12 units at lunch and supper.Take as directed per insulin instructions, Disp: 32.4 mL, Rfl: 3    /65 (BP Location: Right arm, Patient Position: Sitting)   Pulse 84   Ht 1.499 m (4' 11\")   Wt 69.4 kg (153 lb)   SpO2 93%   BMI 30.90 kg/m²     PHYSICAL EXAM:  GENERAL: NAD  HEENT: no JVD  CV: RRR without m/r/g  PULM: CTAB  EXT: non-edematous bilateral lower extremities     LABS  WBC (x10*3/uL)   Date Value   12/17/2024 8.2     Hemoglobin (g/dL)   Date Value   12/17/2024 16.2 (H)     Platelets (x10*3/uL)   Date Value   12/17/2024 221     Sodium (mmol/L)   Date Value   01/10/2025 141     Potassium (mmol/L)   Date Value   01/10/2025 4.0     Chloride (mmol/L)   Date Value   01/10/2025 101     Bicarbonate (mmol/L)   Date Value   01/10/2025 31     Urea Nitrogen (mg/dL)   Date Value   01/10/2025 23     Creatinine (mg/dL)   Date Value   01/10/2025 1.46 (H)     Calcium (mg/dL)   Date Value   01/10/2025 9.9     Total Protein (g/dL)   Date Value "   05/23/2024 6.9   05/23/2024 6.9     Bilirubin, Total (mg/dL)   Date Value   05/23/2024 1.1     Alkaline Phosphatase (U/L)   Date Value   05/23/2024 75     ALT (U/L)   Date Value   09/10/2024 25     AST (U/L)   Date Value   09/10/2024 22     Glucose (mg/dL)   Date Value   01/10/2025 177 (H)     Cholesterol (mg/dL)   Date Value   09/10/2024 136   12/16/2023 185   09/16/2023 150   05/19/2023 132   04/27/2022 129     LDL Calculated (mg/dL)   Date Value   09/10/2024 60   12/16/2023 102 (H)     HDL-Cholesterol (mg/dL)   Date Value   09/10/2024 42.4   12/16/2023 49.3     HDL (mg/dL)   Date Value   09/16/2023 42.5   05/19/2023 37.9 (A)   04/27/2022 41.8     Triglycerides (mg/dL)   Date Value   09/10/2024 168 (H)   12/16/2023 171 (H)   09/16/2023 143   05/19/2023 184 (H)   04/27/2022 160 (H)     POC HEMOGLOBIN A1c (%)   Date Value   04/09/2025 7.6 (A)   12/17/2024 7.5 (A)   09/16/2024 7.5 (A)     Lab Results   Component Value Date    LDLF 79 09/16/2023     ASSESSMENT/PLAN:  79 yo F with hx of DM, CKD, HTN, DLD, and L renal artery stent for renal artery stenosis (2022) here for [facility] follow-up. No UTD labs since previous visit. Though POCT A1c 7.6. Stressed importance of lipid management iso extensive vascular disease. Continue ozzempic 2 mg weekly, empagliflozin 25 mg daily, 35 units lantus daily, and meal-time insulin 10 units with meals. In the future, would like to get off meal-time insulin and uptitrate lantus. Has CGM. BP controlled on amlodipine 10 mg daily, coreg 25 mg BID, and hydrochlorothiazide 12.5 mg daily. Noted not on ACE-I.  from last visit, up from 70s previously.  on ezetimibe 10 mg daily, omega 3, and fluvasatin 40 mg daily. Will continue to watch as not at goal for secondary prevention. If LDL remains elevated in the future, consider switching statin or [id number]. On ASA 81 mg daily given prior renal stent. Repeat US of L renal artery unremarkable.     I spent 20 minutes of face-to-face time with  the patient, with more than 50% of that time spent in counseling and/or coordination of care.    Primitivo Foley DNP, APRN-CNP

## 2025-05-16 ENCOUNTER — APPOINTMENT (OUTPATIENT)
Dept: PHARMACY | Facility: HOSPITAL | Age: 80
End: 2025-05-16
Payer: MEDICARE

## 2025-05-16 DIAGNOSIS — E11.9 TYPE 2 DIABETES MELLITUS WITH INSULIN THERAPY (MULTI): ICD-10-CM

## 2025-05-16 DIAGNOSIS — Z79.4 TYPE 2 DIABETES MELLITUS WITH INSULIN THERAPY (MULTI): ICD-10-CM

## 2025-05-16 NOTE — PROGRESS NOTES
Clinical Pharmacy Appointment    Patient ID: Eleonora Varela is a 79 y.o. female who presents for Diabetes.    Pt is here for Follow Up appointment.     Referring Provider: Alma Pavon MD  PCP: Jennifer Kenney MD  Last visit with PCP: 5/13/25   Next visit with PCP: 9/16/25    Subjective   HPI  Eleonora Varela is a 79 y.o. year old female patient with type two diabetes mellitus, CKD G3b/A1, hypertension, dyslipidemia, and left renal artery stent for POONAM (2022). Patient follows with Dr. Pavon through the [facility] clinic for cardiometabolic risk factor modification. She remains on basal/bolus insulin therapy, Ozempic 2 mg and Jardiance 25 mg once daily. She is connected to her Doctor kinetic reader device. She is managed on 40 units of basal insulin along with a meal-time dosing of 12-12-12. She remains on Ozempic 2 mg and Jardiance 25 mg daily. Today she is presenting for a diabetes management visit.      DIABETES MELLITUS TYPE 2:    Known diabetic complications: CKD G3b.  Does patient follow with Endocrinology: No  Last optometry exam: 4/15/25  Most recent visit in Podiatry: does not follow      Current diabetic medications include:  Lantus solostar 100 units/mL: Inject 40 units subcutaneous once daily AM   Humalog KwikPen 100 units/mL: 12-12-12 units TID AC   10 units in the morning if she goes swimming  Ozempic 2 mg inject 2 mg under the skin once a week on Wednesdays  Jardiance 25 mg: Take one tablet by mouth once daily     Clarifications to above regimen: none  Adverse Effects: none reported    Past diabetic medications include:  Humulin N insulin   Metformin - d/c'ed by Dr. Shelley in 2020    Glucose Readings:  Glucometer/CGM Type: Cory 3   Glucometer: Onetouch Ultra 2   Smartphone: [employer]   Connected to "[employer]w: No     Current home BG readings (mg/dL):   Time in [employer]   7 Day  Above Target: 49%  In Target: 51%  Below Target: 0 14 day  Above Target: 39%  In Target: 61%  Below Target: 0    30 days  Above Target:  36%  In Target: 63%  Below Target: 1%   Average glucose    Sensor Usage Low Glucose Events   7 days average: 187 mg/dL      7 Days: 92%  Views per day: 19  Sensor data captured:  7: 0   14: 1       Previous home BG readings (mg/dL):   CGM Data - 4/11/2025 (Cory 3)          Time in Target   7 Day  Above Target: 36%  In Target: 64%  Below Target: 0% 14 day  Above Target: N/A  In Target: N/A  Below Target: N/A    30 days  Above Target: N/A  In Target: N/A  Below Target: N/A   Average glucose    Sensor Usage Low Glucose Events   7 days average: 170 mg/dL    7 Days:  Views per day: 17  Sensor data captured: 83% 7 Days: 1         CGM Data - 2/28/2025          Time in Target   7 Day  Above Target: 46%  In Target: 54%  Below Target: 0% 14 day  Above Target: 60%  In Target: 40%  Below Target: 0%    30 days  Above Target: 64%  In Target: 36%  Below Target: 0%   Average glucose    Sensor Usage Low Glucose Events   7 days average: 164 mg/dL  12 am to 6 am 176 mg/dL  6 am to 12 pm 155 mg/L  12 to 6 pm 142 mg/dL  6 pm to 12 am 184 mg/dL     14 day average: 178 mg/dL  12 am to 6 am 179 mg/dL  6 am to 12 pm 172 mg/L  12 to 6 pm 163 mg/dL  6 pm to 12 am 199 mg/dL     30 day average: 187 mg/dL  12 am to 6 am 192 mg/dL  6 am to 12 pm 181 mg/L  12 to 6 pm 166 mg/dL  6 pm to 12 am 209 mg/dL         Any episodes of hypoglycemia? Yes, 1 incidence reported.  Did patient treat episode of hypoglycemia appropriately? Yes, fixes as appropraite  Does the patient have a prescription for ready-to-use Glucagon? No      Secondary Prevention:  Statin? Yes  ACE-I/ARB? No  Aspirin? Yes    Pertinent PMH Review:  PMH of Pancreatitis: No  PMH of Retinopathy: No  PMH of Urinary Tract Infections: No  PMH of MTC: No, per chart review  PMH of MEN2: No, per chart review  UACR/EGFR in last year?: Yes  Albumin/Creatinine Ratio   Date Value Ref Range Status   12/17/2024   Final     Comment:     One or more analytes used in this calculation is outside of the  analytical measurement range. Calculation cannot be performed.   01/12/2024 40.7 (H) <30.0 ug/mg Creat Final   12/16/2023 13.6 <30.0 ug/mg Creat Final     Affordability/Accessibility:   Lantus solostar cost/coverage reviewed - $0    Medication Reconciliation:  Changed: none  Added: none  Discontinued: none    Drug Interactions  No relevant drug interactions were noted.    Medication System Management  Patient's preferred pharmacy: [facility]  Adherence/Organization: no issues reported  Affordability/Accessibility: no issues reported    Objective   Allergies[1]  Social History     Social History Narrative    Not on file      Medication Review  Current Outpatient Medications   Medication Instructions    amLODIPine (NORVASC) 10 mg, oral, Daily    aspirin 81 mg EC tablet 1 tablet, Daily    blood-glucose meter (OneTouch Ultra2 Meter) misc Use as directed to monitor blood glucose once daily    blood-glucose sensor (FreeStyle Cory 3 Plus Sensor) device Apply 1 sensor to the back of the upper arm every 15 days. Change sensor every 15 days.    calcium carb/vit D3/minerals (CALCIUM 600 + MINERALS ORAL) 1,200 mg, Daily    carvedilol (COREG) 25 mg, oral, 2 times daily (morning and late afternoon)    cholecalciferol (VITAMIN D-3) 25 mcg, Daily    CRANBERRY ORAL 1 tablet, 2 times daily    cyanocobalamin, vitamin B-12, 1,000 mcg tablet, sublingual Place under the tongue. one tab sublingual daily five days per week, monday thru friday    diabetic supplies, miscellan. misc Onetouch ultrasoft lancets misc    diclofenac sodium (VOLTAREN) 2 g, Topical, 2 times daily PRN, Can increase to three times per day for up to 10 days if needed.    empagliflozin (Jardiance) 25 mg tablet TAKE 1 TABLET BY MOUTH EVERY MORNING    ezetimibe (ZETIA) 10 mg, oral, Daily, DAILY    fluvastatin (LESCOL) 40 mg, oral, Nightly    FreeStyle Cory 3 Pease misc Use as instructed to monitor blood glucose four times daily.    hydroCHLOROthiazide  "(HYDRODIURIL) 12.5 mg, oral, Daily, DAILY    insulin glargine (Lantus Solostar U-100 Insulin) 100 unit/mL (3 mL) pen Inject 40 units under the skin once daily. Take as directed per insulin instructions.    insulin lispro (HUMALOG) 12 Units, subcutaneous, 3 times daily (morning, midday, late afternoon), Except on swim days, 10 units am and then 12 units at lunch and supper.Take as directed per insulin instructions    lancets (MICROLET LANCET MISC) Test sugar 6 times a day    loratadine (CLARITIN REDITABS) 10 mg, Daily    MAGNESIUM CITRATE ORAL 1 tablet, Nightly    meclizine (ANTIVERT) 12.5 mg, oral, 3 times daily PRN    omega-3/dha/epa/fish oil (OMEGA-3 ORAL) 1 capsule, 2 times daily    OneTouch Ultra Test strip Use as directed once daily to test blood glucose.    Ozempic 2 mg, subcutaneous, Once Weekly    pen needle, diabetic 32 gauge x 5/32\" needle Use as directed four times daily with insulin injections. Change pen needle with each insulin injection.    psyllium (Metamucil) 3.4 gram packet Nightly      Vitals  BP Readings from Last 2 Encounters:   05/13/25 108/65   04/09/25 114/50     BMI Readings from Last 1 Encounters:   05/13/25 30.90 kg/m²      Labs  A1C  Lab Results   Component Value Date    HGBA1C 7.6 (A) 04/09/2025    HGBA1C 7.5 (A) 12/17/2024    HGBA1C 7.5 (A) 09/16/2024     BMP  Lab Results   Component Value Date    CALCIUM 9.9 01/10/2025     01/10/2025    K 4.0 01/10/2025    CO2 31 01/10/2025     01/10/2025    BUN 23 01/10/2025    CREATININE 1.46 (H) 01/10/2025    EGFR 37 (L) 01/10/2025     LFTs  Lab Results   Component Value Date    ALT 25 09/10/2024    AST 22 09/10/2024    ALKPHOS 75 05/23/2024    BILITOT 1.1 05/23/2024     FLP  Lab Results   Component Value Date    TRIG 168 (H) 09/10/2024    CHOL 136 09/10/2024    LDLF 79 09/16/2023    LDLCALC 60 09/10/2024    HDL 42.4 09/10/2024     Urine Microalbumin  Lab Results   Component Value Date    MICROALBCREA  12/17/2024      Comment:      One " or more analytes used in this calculation is outside of the analytical measurement range. Calculation cannot be performed.     Weight Management  Wt Readings from Last 3 Encounters:   05/13/25 69.4 kg (153 lb)   04/09/25 68.5 kg (151 lb)   03/06/25 68 kg (150 lb)      There is no height or weight on file to calculate BMI.     Assessment/Plan   Problem List Items Addressed This Visit       Type 2 diabetes mellitus with insulin therapy (Multi)    Patients diabetes is currently not well controlled. Her last A1c on 4/9/25 was 7.6% (Goal <7%). Patients 7-day Libreview data shows a TIR of 49% & an average glucose of 187 mg/dL. Her reports indicate 1 incidence of hypoglycemia which the patient did resolve appropriately. Discussed need for further glycemic control at this time. Patient reports that she is currently taking Lantus 40 units in the morning, Ozempic 2mg weekly, Jardiance 25mg once daily, & 12-12-12 units of Humalog insulin. Patient reports that she will only take 10 units of Humalog on mornings that she is doing intensive water aerobics.    Will plan to increase Lantus therapy to 42 units daily in the morning at this time. Will titrate insulin slowly based on previous episodes of hypoglycemia. Patient to follow up in 1 month to assess BG control.     Plan:   Continue  Humalog KwikPen 100 units/mL: 12-12-12 units TID AC   Ozempic 2 mg inject 2 mg under the skin once a week on Wednesdays  Jardiance 25 mg: Take one tablet by mouth once daily    Increase:  Lantus solostar 100 units/mL: Inject 42 units subcutaneous once daily AM          Relevant Orders    Referral to Clinical Pharmacy       Clinical Pharmacist follow-up: 6/20/25, Telehealth visit  Patient is not followed in [facility].     Continue all meds under the continuation of care with the referring provider and clinical pharmacy team.    Thank you,  Coco Mesa  Clinical Pharmacist  [phone]    Verbal consent to manage patient's drug therapy was obtained from  the patient. They were informed they may decline to participate or withdraw from participation in pharmacy services at any time.          [1]   Allergies  Allergen Reactions    Propoxyphene N-Acetaminophen Drowsiness    Adhesive Tape-Silicones Other     Steristrips - takes skin off    Cefaclor Palpitations    Latex Rash

## 2025-05-19 ENCOUNTER — APPOINTMENT (OUTPATIENT)
Dept: CARDIOLOGY | Facility: CLINIC | Age: 80
End: 2025-05-19
Payer: MEDICARE

## 2025-05-19 NOTE — ASSESSMENT & PLAN NOTE
Patients diabetes is currently not well controlled. Her last A1c on 4/9/25 was 7.6% (Goal <7%). Patients 7-day Libreview data shows a TIR of 49% & an average glucose of 187 mg/dL. Her reports indicate 1 incidence of hypoglycemia which the patient did resolve appropriately. Discussed need for further glycemic control at this time. Patient reports that she is currently taking Lantus 40 units in the morning, Ozempic 2mg weekly, Jardiance 25mg once daily, & 12-12-12 units of Humalog insulin. Patient reports that she will only take 10 units of Humalog on mornings that she is doing intensive water aerobics.    Will plan to increase Lantus therapy to 42 units daily in the morning at this time. Will titrate insulin slowly based on previous episodes of hypoglycemia. Patient to follow up in 1 month to assess BG control.     Plan:   Continue  Humalog KwikPen 100 units/mL: 12-12-12 units TID AC   Ozempic 2 mg inject 2 mg under the skin once a week on Wednesdays  Jardiance 25 mg: Take one tablet by mouth once daily    Increase:  Lantus solostar 100 units/mL: Inject 42 units subcutaneous once daily AM

## 2025-05-20 NOTE — PROGRESS NOTES
I reviewed the progress note and agree with the resident’s findings and plans as written. Case discussed with resident.    Armand De La Torre, RoloD

## 2025-05-28 PROCEDURE — RXMED WILLOW AMBULATORY MEDICATION CHARGE

## 2025-05-29 ENCOUNTER — PHARMACY VISIT (OUTPATIENT)
Dept: PHARMACY | Facility: CLINIC | Age: 80
End: 2025-05-29
Payer: MEDICARE

## 2025-06-04 ENCOUNTER — LAB (OUTPATIENT)
Dept: LAB | Facility: HOSPITAL | Age: 80
End: 2025-06-04
Payer: MEDICARE

## 2025-06-04 DIAGNOSIS — D89.89 KAPPA LIGHT CHAIN DISEASE (MULTI): ICD-10-CM

## 2025-06-04 DIAGNOSIS — D47.2 MGUS (MONOCLONAL GAMMOPATHY OF UNKNOWN SIGNIFICANCE): ICD-10-CM

## 2025-06-04 LAB
ALBUMIN SERPL BCP-MCNC: 4.4 G/DL (ref 3.4–5)
ALP SERPL-CCNC: 82 U/L (ref 33–136)
ALT SERPL W P-5'-P-CCNC: 21 U/L (ref 7–45)
ANION GAP SERPL CALC-SCNC: 13 MMOL/L (ref 10–20)
AST SERPL W P-5'-P-CCNC: 18 U/L (ref 9–39)
BASOPHILS # BLD AUTO: 0.03 X10*3/UL (ref 0–0.1)
BASOPHILS NFR BLD AUTO: 0.5 %
BILIRUB SERPL-MCNC: 1.2 MG/DL (ref 0–1.2)
BUN SERPL-MCNC: 25 MG/DL (ref 6–23)
CALCIUM SERPL-MCNC: 9.8 MG/DL (ref 8.6–10.6)
CHLORIDE SERPL-SCNC: 101 MMOL/L (ref 98–107)
CO2 SERPL-SCNC: 30 MMOL/L (ref 21–32)
CREAT SERPL-MCNC: 1.39 MG/DL (ref 0.5–1.05)
EGFRCR SERPLBLD CKD-EPI 2021: 39 ML/MIN/1.73M*2
EOSINOPHIL # BLD AUTO: 0.19 X10*3/UL (ref 0–0.4)
EOSINOPHIL NFR BLD AUTO: 3.3 %
ERYTHROCYTE [DISTWIDTH] IN BLOOD BY AUTOMATED COUNT: 11.9 % (ref 11.5–14.5)
GLUCOSE SERPL-MCNC: 284 MG/DL (ref 74–99)
HCT VFR BLD AUTO: 46.4 % (ref 36–46)
HGB BLD-MCNC: 15.5 G/DL (ref 12–16)
IMM GRANULOCYTES # BLD AUTO: 0.01 X10*3/UL (ref 0–0.5)
IMM GRANULOCYTES NFR BLD AUTO: 0.2 % (ref 0–0.9)
KAPPA LC SERPL-MCNC: 2.77 MG/DL (ref 0.33–1.94)
KAPPA LC/LAMBDA SER: 1.24 {RATIO} (ref 0.26–1.65)
LAMBDA LC SERPL-MCNC: 2.24 MG/DL (ref 0.57–2.63)
LYMPHOCYTES # BLD AUTO: 1.84 X10*3/UL (ref 0.8–3)
LYMPHOCYTES NFR BLD AUTO: 32.3 %
MCH RBC QN AUTO: 29.8 PG (ref 26–34)
MCHC RBC AUTO-ENTMCNC: 33.4 G/DL (ref 32–36)
MCV RBC AUTO: 89 FL (ref 80–100)
MONOCYTES # BLD AUTO: 0.34 X10*3/UL (ref 0.05–0.8)
MONOCYTES NFR BLD AUTO: 6 %
NEUTROPHILS # BLD AUTO: 3.28 X10*3/UL (ref 1.6–5.5)
NEUTROPHILS NFR BLD AUTO: 57.7 %
NRBC BLD-RTO: 0 /100 WBCS (ref 0–0)
PLATELET # BLD AUTO: 194 X10*3/UL (ref 150–450)
POTASSIUM SERPL-SCNC: 4.8 MMOL/L (ref 3.5–5.3)
PROT SERPL-MCNC: 6.9 G/DL (ref 6.4–8.2)
PROT SERPL-MCNC: 6.9 G/DL (ref 6.4–8.2)
RBC # BLD AUTO: 5.21 X10*6/UL (ref 4–5.2)
SODIUM SERPL-SCNC: 139 MMOL/L (ref 136–145)
WBC # BLD AUTO: 5.7 X10*3/UL (ref 4.4–11.3)

## 2025-06-04 PROCEDURE — 85025 COMPLETE CBC W/AUTO DIFF WBC: CPT

## 2025-06-04 PROCEDURE — 80053 COMPREHEN METABOLIC PANEL: CPT

## 2025-06-04 PROCEDURE — 84155 ASSAY OF PROTEIN SERUM: CPT

## 2025-06-04 PROCEDURE — 83521 IG LIGHT CHAINS FREE EACH: CPT

## 2025-06-10 ENCOUNTER — OFFICE VISIT (OUTPATIENT)
Dept: HEMATOLOGY/ONCOLOGY | Facility: CLINIC | Age: 80
End: 2025-06-10
Payer: MEDICARE

## 2025-06-10 VITALS
SYSTOLIC BLOOD PRESSURE: 129 MMHG | HEART RATE: 71 BPM | TEMPERATURE: 96.4 F | WEIGHT: 156.42 LBS | DIASTOLIC BLOOD PRESSURE: 72 MMHG | BODY MASS INDEX: 31.59 KG/M2 | OXYGEN SATURATION: 95 % | RESPIRATION RATE: 16 BRPM

## 2025-06-10 DIAGNOSIS — D47.2 MGUS (MONOCLONAL GAMMOPATHY OF UNKNOWN SIGNIFICANCE): ICD-10-CM

## 2025-06-10 DIAGNOSIS — D89.89 KAPPA LIGHT CHAIN DISEASE (MULTI): ICD-10-CM

## 2025-06-10 LAB
ALBUMIN: 4.4 G/DL (ref 3.4–5)
ALPHA 1 GLOBULIN: 0.2 G/DL (ref 0.2–0.6)
ALPHA 2 GLOBULIN: 0.7 G/DL (ref 0.4–1.1)
BETA GLOBULIN: 0.7 G/DL (ref 0.5–1.2)
GAMMA GLOBULIN: 0.8 G/DL (ref 0.5–1.4)
IMMUNOFIXATION COMMENT: NORMAL
PATH REVIEW - SERUM IMMUNOFIXATION: NORMAL
PATH REVIEW-SERUM PROTEIN ELECTROPHORESIS: NORMAL
PROTEIN ELECTROPHORESIS COMMENT: NORMAL

## 2025-06-10 PROCEDURE — 99214 OFFICE O/P EST MOD 30 MIN: CPT | Performed by: INTERNAL MEDICINE

## 2025-06-10 PROCEDURE — 3078F DIAST BP <80 MM HG: CPT | Performed by: INTERNAL MEDICINE

## 2025-06-10 PROCEDURE — 1159F MED LIST DOCD IN RCRD: CPT | Performed by: INTERNAL MEDICINE

## 2025-06-10 PROCEDURE — G2211 COMPLEX E/M VISIT ADD ON: HCPCS | Performed by: INTERNAL MEDICINE

## 2025-06-10 PROCEDURE — 1126F AMNT PAIN NOTED NONE PRSNT: CPT | Performed by: INTERNAL MEDICINE

## 2025-06-10 PROCEDURE — 3074F SYST BP LT 130 MM HG: CPT | Performed by: INTERNAL MEDICINE

## 2025-06-10 ASSESSMENT — PAIN SCALES - GENERAL: PAINLEVEL_OUTOF10: 0-NO PAIN

## 2025-06-10 NOTE — PROGRESS NOTES
Follow-up in 12 months with labs prior at [facility].  Patient teaching provided regarding [facility] transition and patient notified paper lab requisition will be required for non-[facility] labs.   Patient states she will come to [facility] for labs this time.  Call back instructions reviewed.  Patient verbalized understanding.
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
## 2025-06-20 ENCOUNTER — APPOINTMENT (OUTPATIENT)
Dept: PHARMACY | Facility: HOSPITAL | Age: 80
End: 2025-06-20
Payer: MEDICARE

## 2025-06-20 DIAGNOSIS — E11.9 TYPE 2 DIABETES MELLITUS WITH INSULIN THERAPY (MULTI): ICD-10-CM

## 2025-06-20 DIAGNOSIS — Z79.4 TYPE 2 DIABETES MELLITUS WITH INSULIN THERAPY (MULTI): ICD-10-CM

## 2025-06-20 NOTE — PROGRESS NOTES
Clinical Pharmacy Appointment    Patient ID: Eleonora Varela is a 79 y.o. female who presents for No chief complaint on file..    Pt is here for Follow Up appointment.     Referring Provider: Alma Pavon MD  PCP: Jennifer Kenney MD  Last visit with PCP: 5/13/25   Next visit with PCP: 9/16/25    Subjective     HPI  Eleonora Varela is a 79 y.o. year old female patient with type two diabetes mellitus, CKD G3b/A1, hypertension, dyslipidemia, and left renal artery stent for POONAM (2022). Patient follows with Dr. Pavon through the [facility] clinic for cardiometabolic risk factor modification. She remains on basal/bolus insulin therapy, Ozempic 2 mg and Jardiance 25 mg once daily. She is connected to her [employer] reader device. She utilizes Freestyle poly 3 for continuous glucose monitoring. She is managed on 42 units of basal insulin along with a meal-time dosing of 12-12-12. She remains on Ozempic 2 mg and Jardiance 25 mg daily. Today she is presenting for a diabetes management visit.       DIABETES MELLITUS TYPE 2:    Known diabetic complications: CKD G3b/A1  Does patient follow with Endocrinology: No  Last optometry exam: 4/15/25  Most recent visit in Podiatry: does not follow      Lifestyle:  Diet: 3 meals/day.   Had Gum surgery in May, will be having gum surgery again in July 2025  Concentrated sweets: 1x/mo ice cream cone   Drinks: Dr. Pepper zero (2 cans/day), water   Previously would drink crystal light with water frequently  Eggs for breakfast  Whole grain bread with turkey slices and cheese twice weekly  Three times weekly has strawberry poppyseed salad with chicken  Endorses that she is not overall good with vegetable consumption  Fruits: Mandarins twice daily, pineapple, apples occasionally, rarely banana, grapes  She will be going to the [id number] [place] three days next week  She denies consumption of tomatoes, potatoes, banana due to fear of hyperkalemia in the past  Snacks: Evening pretzel stick  consumption daily (x3 - ~22 g/carbohydrates)  Exercise:   Water aerobics in the mornings  Reports physical inactivity after dinner  Has a stationary bike at home  Tobacco history: Never  Alcohol: None    Current diabetic medications include:  Lantus solostar 100 units/mL: Inject 42 units subcutaneous once daily AM   Humalog KwikPen 100 units/mL: 12-12-12 units TID AC   10 units in the morning if she goes swimming  Ozempic 2 mg inject 2 mg under the skin once weekly on Wednesdays  Jardiance 25 mg: Take one tablet by mouth once daily     Clarifications to above regimen: none  Adverse Effects: none reported    Past diabetic medications include:  Humulin N insulin   Metformin - d/c'ed by Dr. Shelley in 2020    Glucose Readings:  Glucometer/CGM Type: Cory 3   Glucometer: Onetouch Ultra 2   Smartphone: [employer]   Connected to [employer]: No     Reported difficulty with previous two sensors that were placed - patient contacted  for sensor replacements     CGM Data - 6/20/2025    Time in Target   7 Day  Above Target: 50%  In Target: 50%  Below Target: 0% 14 day  Above Target: 51%  In Target: 49%  Below Target: 0%   30 days  Above Target: 50%  In Target: 50%  Below Target: 0%   Average glucose    Sensor Usage Low Glucose Events   7 days average: 184 mg/dL    12 am to 6 am 171 mg/dL  6 am to 12 pm 154 mg/L  12 to 6 pm 170 mg/dL  6 pm to 12 am 247 mg/dL    14 day average: 189 mg/dL  12 am to 6 am 191 mg/dL  6 am to 12 pm 161 mg/L  12 to 6 pm 173 mg/dL  6 pm to 12 am 234 mg/dL    30 day average: 188 mg/dL  12 am to 6 am 185 mg/dL  6 am to 12 pm 167 mg/L  12 to 6 pm 178 mg/dL  6 pm to 12 am 220 mg/dL 7 Days:  Views per day: 15  Sensor data captured: 92%    14 Days:  Views per day: 17  Sensor data captured: 96%    30 Days:  Views per day: 18  Sensor data captured: 99% 7 Days: 0  14 Days: 0  30 Days: 2  6-12: 1  12-6: 1       Any episodes of hypoglycemia? Yes x 2 - Left hand tingling.   Confirmation via fingerstick? Yes  - within ~4 mg/dL   Did patient treat episode of hypoglycemia appropriately? N/A  Does the patient have a prescription for ready-to-use Glucagon? No     Previous home BG readings (mg/dL):   Time in Target   7 Day  Above Target: 49%  In Target: 51%  Below Target: 0 14 day  Above Target: 39%  In Target: 61%  Below Target: 0    30 days  Above Target: 36%  In Target: 63%  Below Target: 1%   Average glucose    Sensor Usage Low Glucose Events   7 days average: 187 mg/dL      7 Days: 92%  Views per day: 19  Sensor data captured:  7: 0   14: 1       Previous home BG readings (mg/dL):   CGM Data - 4/11/2025 (Cory 3)          Time in Target   7 Day  Above Target: 36%  In Target: 64%  Below Target: 0% 14 day  Above Target: N/A  In Target: N/A  Below Target: N/A    30 days  Above Target: N/A  In Target: N/A  Below Target: N/A   Average glucose    Sensor Usage Low Glucose Events   7 days average: 170 mg/dL    7 Days:  Views per day: 17  Sensor data captured: 83% 7 Days: 1         CGM Data - 2/28/2025          Time in Target   7 Day  Above Target: 46%  In Target: 54%  Below Target: 0% 14 day  Above Target: 60%  In Target: 40%  Below Target: 0%    30 days  Above Target: 64%  In Target: 36%  Below Target: 0%   Average glucose        7 days average: 164 mg/dL  12 am to 6 am 176 mg/dL  6 am to 12 pm 155 mg/L  12 to 6 pm 142 mg/dL  6 pm to 12 am 184 mg/dL     14 day average: 178 mg/dL  12 am to 6 am 179 mg/dL  6 am to 12 pm 172 mg/L  12 to 6 pm 163 mg/dL  6 pm to 12 am 199 mg/dL     30 day average: 187 mg/dL  12 am to 6 am 192 mg/dL  6 am to 12 pm 181 mg/L  12 to 6 pm 166 mg/dL  6 pm to 12 am 209 mg/dL       Secondary Prevention:  Statin? Yes  ACE-I/ARB? No  Aspirin? Yes    Pertinent PMH Review:  PMH of Pancreatitis: No  PMH of Retinopathy: No  PMH of Urinary Tract Infections: No  PMH of MTC: No  PMH of MEN2: No  UACR/EGFR in last year?: Yes  Albumin/Creatinine Ratio   Date Value Ref Range Status   12/17/2024   Final     Comment:      One or more analytes used in this calculation is outside of the analytical measurement range. Calculation cannot be performed.   01/12/2024 40.7 (H) <30.0 ug/mg Creat Final   12/16/2023 13.6 <30.0 ug/mg Creat Final     Affordability/Accessibility:   Lantus solostar cost/coverage reviewed - $0    Medication Reconciliation:  Changed: none  Added: none  Discontinued: none    Drug Interactions  No relevant drug interactions were noted.    Medication System Management  Patient's preferred pharmacy: [facility]  Adherence/Organization: no issues reported  Affordability/Accessibility: no issues reported    Objective   Allergies[1]  Social History     Social History Narrative    Not on file      Medication Review  Current Outpatient Medications   Medication Instructions    amLODIPine (NORVASC) 10 mg, oral, Daily    aspirin 81 mg EC tablet 1 tablet, Daily    blood-glucose meter (OneTouch Ultra2 Meter) misc Use as directed to monitor blood glucose once daily    blood-glucose sensor (FreeStyle Cory 3 Plus Sensor) device Apply 1 sensor to the back of the upper arm every 15 days. Change sensor every 15 days.    calcium carb/vit D3/minerals (CALCIUM 600 + MINERALS ORAL) 1,200 mg, Daily    carvedilol (COREG) 25 mg, oral, 2 times daily (morning and late afternoon)    cholecalciferol (VITAMIN D-3) 25 mcg, Daily    CRANBERRY ORAL 1 tablet, 2 times daily    cyanocobalamin, vitamin B-12, 1,000 mcg tablet, sublingual Place under the tongue. one tab sublingual daily five days per week, monday thru friday    diabetic supplies, miscellan. misc Onetouch ultrasoft lancets misc    diclofenac sodium (VOLTAREN) 2 g, Topical, 2 times daily PRN, Can increase to three times per day for up to 10 days if needed.    empagliflozin (Jardiance) 25 mg tablet TAKE 1 TABLET BY MOUTH EVERY MORNING    ezetimibe (ZETIA) 10 mg, oral, Daily, DAILY    fluvastatin (LESCOL) 40 mg, oral, Nightly    FreeStyle Cory 3 Leon misc Use as instructed to  "monitor blood glucose four times daily.    hydroCHLOROthiazide (HYDRODIURIL) 12.5 mg, oral, Daily, DAILY    insulin glargine (Lantus Solostar U-100 Insulin) 100 unit/mL (3 mL) pen Inject 40 units under the skin once daily. Take as directed per insulin instructions.    insulin lispro (HUMALOG) 12 Units, subcutaneous, 3 times daily (morning, midday, late afternoon), Except on swim days, 10 units am and then 12 units at lunch and supper.Take as directed per insulin instructions    lancets (MICROLET LANCET MISC) Test sugar 6 times a day    loratadine (CLARITIN REDITABS) 10 mg, Daily    MAGNESIUM CITRATE ORAL 1 tablet, Nightly    meclizine (ANTIVERT) 12.5 mg, oral, 3 times daily PRN    omega-3/dha/epa/fish oil (OMEGA-3 ORAL) 1 capsule, 2 times daily    OneTouch Ultra Test strip Use as directed once daily to test blood glucose.    Ozempic 2 mg, subcutaneous, Once Weekly    pen needle, diabetic 32 gauge x 5/32\" needle Use as directed four times daily with insulin injections. Change pen needle with each insulin injection.    psyllium (Metamucil) 3.4 gram packet Nightly      Vitals  BP Readings from Last 2 Encounters:   06/10/25 129/72   05/13/25 108/65     BMI Readings from Last 1 Encounters:   06/10/25 31.59 kg/m²      Labs  A1C  Lab Results   Component Value Date    HGBA1C 7.6 (A) 04/09/2025    HGBA1C 7.5 (A) 12/17/2024    HGBA1C 7.5 (A) 09/16/2024     BMP  Lab Results   Component Value Date    CALCIUM 9.8 06/04/2025     06/04/2025    K 4.8 06/04/2025    CO2 30 06/04/2025     06/04/2025    BUN 25 (H) 06/04/2025    CREATININE 1.39 (H) 06/04/2025    EGFR 39 (L) 06/04/2025     LFTs  Lab Results   Component Value Date    ALT 21 06/04/2025    AST 18 06/04/2025    ALKPHOS 82 06/04/2025    BILITOT 1.2 06/04/2025     FLP  Lab Results   Component Value Date    TRIG 168 (H) 09/10/2024    CHOL 136 09/10/2024    LDLF 79 09/16/2023    LDLCALC 60 09/10/2024    HDL 42.4 09/10/2024     Urine Microalbumin  Lab Results "   Component Value Date    MICROALBCREA  12/17/2024      Comment:      One or more analytes used in this calculation is outside of the analytical measurement range. Calculation cannot be performed.     Weight Management  Wt Readings from Last 3 Encounters:   06/10/25 70.9 kg (156 lb 6.7 oz)   05/13/25 69.4 kg (153 lb)   04/09/25 68.5 kg (151 lb)      There is no height or weight on file to calculate BMI.     Assessment/Plan   Problem List Items Addressed This Visit    None    Clinical Pharmacist follow-up: 6 weeks - 8/1/2025, Telehealth visit  Patient is not followed in CCM.     Continue all meds under the continuation of care with the referring provider and clinical pharmacy team.    Thank you,  Armand De La Torre, PharmD    Clinical Pharmacist  [phone]    Verbal consent to manage patient's drug therapy was obtained from the patient. They were informed they may decline to participate or withdraw from participation in pharmacy services at any time.          [1]   Allergies  Allergen Reactions    Propoxyphene N-Acetaminophen Drowsiness    Adhesive Tape-Silicones Other     Steristrips - takes skin off    Cefaclor Palpitations    Latex Rash

## 2025-06-20 NOTE — ASSESSMENT & PLAN NOTE
Uncontrolled as evidenced by last completed A1c on 4/9/2025 at 7.6% (Goal <7%). She is adherent  to prescribed therapy including Lantus solostar at 42 untis, Ozempic 2mg weekly, Jardiance 25mg once daily, & 12-12-12 units of Humalog insulin. Patient reports that she will only take 10 units of Humalog on mornings that she is doing intensive water aerobics.     Time in target and average glucose both above goal based on 7, 14, 30 day reports. When discussing diet it was identified that she has been consuming pretzel rods x 3 on a nearly daily basis which has led to evening glucose readings. Advised to reduce frequency of consumption of pretzel rods. She was unaware of the glycemic impact of pretzels. No changes to DM regimen. Follow up in 6 weeks.      Plan:  Continue  Humalog KwikPen 100 units/mL: 12-12-12 units TID AC   Ozempic 2 mg inject 2 mg under the skin once a week on Wednesdays  Jardiance 25 mg: Take one tablet by mouth once daily               Increase:  Lantus solostar 100 units/mL: Inject 42 units subcutaneous once daily AM

## 2025-07-05 PROCEDURE — RXMED WILLOW AMBULATORY MEDICATION CHARGE

## 2025-07-08 ENCOUNTER — PHARMACY VISIT (OUTPATIENT)
Dept: PHARMACY | Facility: CLINIC | Age: 80
End: 2025-07-08
Payer: MEDICARE

## 2025-08-01 ENCOUNTER — TELEPHONE (OUTPATIENT)
Dept: PRIMARY CARE | Facility: CLINIC | Age: 80
End: 2025-08-01

## 2025-08-01 ENCOUNTER — APPOINTMENT (OUTPATIENT)
Dept: PHARMACY | Facility: HOSPITAL | Age: 80
End: 2025-08-01
Payer: MEDICARE

## 2025-08-01 DIAGNOSIS — Z79.4 TYPE 2 DIABETES MELLITUS WITH INSULIN THERAPY (MULTI): Primary | ICD-10-CM

## 2025-08-01 DIAGNOSIS — E11.9 TYPE 2 DIABETES MELLITUS WITH INSULIN THERAPY (MULTI): Primary | ICD-10-CM

## 2025-08-01 NOTE — TELEPHONE ENCOUNTER
Patient states she is asking to be able to get her blood work done and want to know if you can coordinate with Dr. Angulo request.    She said she see you on 8/18 and Dr Angulo on 9/16    Please advise

## 2025-08-01 NOTE — Clinical Note
Advised patient to complete laboratory studies from April 2025, if further labs are needed prior to upcoming PCP visit please let patient know.   Thank you!

## 2025-08-01 NOTE — ASSESSMENT & PLAN NOTE
Uncontrolled as evidenced by last completed A1c on 4/9/2025 at 7.6% (Goal <7%). She is adherent  to prescribed therapy including Lantus solostar at 42 untis, Ozempic 2mg weekly, Jardiance 25mg once daily, & 12-12-12 units of Humalog insulin. Patient reports that she will only take 10 units of Humalog on mornings that she is doing intensive water aerobics.     Time in target and average glucose both above goal based on 7, 14, 30 day reports. At last visit it was identified that she has been consuming pretzel rods x 3 on a nearly daily basis which has led to evening glucose readings, she stopped this since last conversation. When discussing low glucose events noted on her CGM she reported symptoms of left hand tingling, but denied cardinal symptoms of dizziness, lightheadedness, shaking, sweating, tremors. When raising her glucose she has drank 8 oz of white grape juice which equates to 31 g of total carbohydrates. Advised to check via fignerstick prior to glucose correction. Furthermore, when correcting start with 15 g of carbohydrates followed by a small meal. Today we discussed the potential consideration in adjusting her GLP1 therapy from Ozempic to Mounjaro once weekly. If prescribed, I recommend an initial dose of 5 mg once weekly then titrating based on clinical response with the goal of minimizing meal-time insulin use. Follow up in six weeks.    Plan:  Continue  Humalog KwikPen 100 units/mL: 12-12-12 units TID AC   Ozempic 2 mg inject 2 mg under the skin once a week on Wednesdays  Jardiance 25 mg: Take one tablet by mouth once daily               Increase:  Lantus solostar 100 units/mL: Inject 42 units subcutaneous once daily AM     Monitoring and Education:  Counseled patient on relevant medication mechanisms of action, expectations, side effects, duration of therapy, contraindications, administration, and monitoring parameters.  All questions and concerns addressed. Contact pharmacist with any further  questions or concerns prior to next appointment.  Reviewed signs, symptoms, and treatment of hypoglycemia.

## 2025-08-01 NOTE — PROGRESS NOTES
Clinical Pharmacy Appointment    Patient ID: Eleonora Varela is a 79 y.o. female who presents for Diabetes.    Pt is here for Follow Up appointment.     Referring Provider: Alma Pavon MD  PCP: Jennifer Kenney MD  Last visit with PCP: 5/13/25   Next visit with PCP: 9/16/25    Subjective     HPI    Eleonora Varela is a 79 y.o. year old female patient with type two diabetes mellitus, CKD G3b/A1, hypertension, dyslipidemia, and left renal artery stent for POONAM (2022). Patient follows with Dr. Pavon through the [facility] clinic for cardiometabolic risk factor modification. She remains on basal/bolus insulin therapy, Ozempic 2 mg and Jardiance 25 mg once daily. She is connected to her Druva reader device. She utilizes Freestyle poly 3 for continuous glucose monitoring. She is managed on 42 units of basal insulin along with a meal-time dosing of 12-12-12. She remains on Ozempic 2 mg and Jardiance 25 mg daily. Today she is presenting for a diabetes management visit.       DIABETES MELLITUS TYPE 2:    Known diabetic complications: CKD G3b/A1, dyslipidemia, hypertension, class I obesity  Does patient follow with Endocrinology: No  Last optometry exam: 4/15/25  Most recent visit in Podiatry: Does not follow      Lifestyle:  Diet: 3 meals/day.   Gum surgery - May 2025 and July 2025  She mentions that she is still eating soft foods   Concentrated sweets: 1x/mo ice cream cone   Drinks: Dr. Pepper zero (2 cans/day), water   Previously would drink crystal light with water frequently  Eggs for breakfast  Whole grain bread with turkey slices and cheese twice weekly  Three times weekly has strawberry poppyseed salad with chicken  Endorses that she is not overall good with vegetable consumption  Fruits: Mandarins twice daily, pineapple, apples occasionally, rarely banana, grapes  She denies consumption of tomatoes, potatoes, banana due to fear of hyperkalemia in the past  Snacks: Evening pretzel stick consumption daily (x3 - ~22  g/carbohydrates) noted at last visit --> She has stopped consumption of pretzel rods at last visit  Exercise:   Water aerobics in the mornings --> Has not been able to attend water aerobics for two weeks   Reports physical inactivity after dinner  Has a stationary bike at home  Tobacco history: Never  Alcohol: None    Current diabetic medications include:  Lantus solostar 100 units/mL: Inject 42 units subcutaneous once daily AM   Humalog KwikPen 100 units/mL: 12-12-12 units TID AC   10 units in the morning if she goes swimming  Ozempic 2 mg inject 2 mg under the skin once weekly on Wednesdays  Jardiance 25 mg: Take one tablet by mouth once daily     Clarifications to above regimen: Self-reduced Humalog from 12 units to 10 units daily for a few days post gum surgery  Adverse Effects: none reported    Past diabetic medications include:  Humulin N insulin   Metformin - d/c'ed by Dr. Shelley in 2020    Glucose Readings:  Glucometer/CGM Type: Cory 3   Glucometer: Onetouch Ultra 2   Smartphone: [employer]   Connected to [place]: No     CGM Data - 8/1/2025  Time in [employer]   7 Day  Above Target: 65%  In Target: 35%  Below Target: 0% 14 day  Above Target: 56%  In Target: 44%  Below Target: 0%   30 days  Above Target: 43%  In Target: 57%  Below Target: 0%   Average glucose    Sensor Usage Low Glucose Events   7 days average: 191 mg/dL  14 day average: 185 mg/dL  30 day average: 171 mg/dL  90 day average: 180 mg/dL 7 Days:  Scans per day: 15  Sensor data captured: 92%    14 Days:  Scans per day: 19  Sensor data captured: 96%    30 Days:  Scans per day: 19  Sensor data captured: 98% 7 Days: 0  14 Days: 1  30 Days: 3       Any episodes of hypoglycemia? Reports eft hand tingling. Denies dizziness, lightheadedness, shaking, sweating.   Confirmation via fingerstick? No confirmation of fingerstick readings in the last two weeks.  Did patient treat episode of hypoglycemia appropriately? Yes. Motts (white grape) - 8 fl oz (31 g of  carbohydrates)  Does the patient have a prescription for ready-to-use Glucagon? No     CGM Data - 6/20/2025    Time in [employer]   7 Day  Above Target: 50%  In Target: 50%  Below Target: 0% 14 day  Above Target: 51%  In Target: 49%  Below Target: 0%   30 days  Above Target: 50%  In Target: 50%  Below Target: 0%   Average glucose    Sensor Usage Low Glucose Events   7 days average: 184 mg/dL    12 am to 6 am 171 mg/dL  6 am to 12 pm 154 mg/L  12 to 6 pm 170 mg/dL  6 pm to 12 am 247 mg/dL    14 day average: 189 mg/dL  12 am to 6 am 191 mg/dL  6 am to 12 pm 161 mg/L  12 to 6 pm 173 mg/dL  6 pm to 12 am 234 mg/dL    30 day average: 188 mg/dL  12 am to 6 am 185 mg/dL  6 am to 12 pm 167 mg/L  12 to 6 pm 178 mg/dL  6 pm to 12 am 220 mg/dL 7 Days:  Views per day: 15  Sensor data captured: 92%    14 Days:  Views per day: 17  Sensor data captured: 96%    30 Days:  Views per day: 18  Sensor data captured: 99% 7 Days: 0  14 Days: 0  30 Days: 2  6-12: 1  12-6: 1         Previous home BG readings (mg/dL):   Time in Target   7 Day  Above Target: 49%  In Target: 51%  Below Target: 0 14 day  Above Target: 39%  In Target: 61%  Below Target: 0    30 days  Above Target: 36%  In Target: 63%  Below Target: 1%   Average glucose    Sensor Usage Low Glucose Events   7 days average: 187 mg/dL      7 Days: 92%  Views per day: 19  Sensor data captured:  7: 0   14: 1       Previous home BG readings (mg/dL):     CGM Data - 4/11/2025 (Cory 3)          Time in Target   7 Day  Above Target: 36%  In Target: 64%  Below Target: 0% 14 day  Above Target: N/A  In Target: N/A  Below Target: N/A    30 days  Above Target: N/A  In Target: N/A  Below Target: N/A   Average glucose    Sensor Usage Low Glucose Events   7 days average: 170 mg/dL    7 Days:  Views per day: 17  Sensor data captured: 83% 7 Days: 1         CGM Data - 2/28/2025          Time in Target   7 Day  Above Target: 46%  In Target: 54%  Below Target: 0% 14 day  Above Target: 60%  In Target:  40%  Below Target: 0%    30 days  Above Target: 64%  In Target: 36%  Below Target: 0%   Average glucose        7 days average: 164 mg/dL  12 am to 6 am 176 mg/dL  6 am to 12 pm 155 mg/L  12 to 6 pm 142 mg/dL  6 pm to 12 am 184 mg/dL     14 day average: 178 mg/dL  12 am to 6 am 179 mg/dL  6 am to 12 pm 172 mg/L  12 to 6 pm 163 mg/dL  6 pm to 12 am 199 mg/dL     30 day average: 187 mg/dL  12 am to 6 am 192 mg/dL  6 am to 12 pm 181 mg/L  12 to 6 pm 166 mg/dL  6 pm to 12 am 209 mg/dL       Secondary Prevention:  Statin? Yes  ACE-I/ARB? No  Aspirin? Yes    Pertinent PMH Review:  PMH of Pancreatitis: No  PMH of Retinopathy: No  PMH of Urinary Tract Infections: No  PMH of MTC: No  PMH of MEN2: No  UACR/EGFR in last year?: Yes  Albumin/Creatinine Ratio   Date Value Ref Range Status   12/17/2024   Final     Comment:     One or more analytes used in this calculation is outside of the analytical measurement range. Calculation cannot be performed.   01/12/2024 40.7 (H) <30.0 ug/mg Creat Final   12/16/2023 13.6 <30.0 ug/mg Creat Final       Medication Reconciliation:  Changed: none  Added: none  Discontinued: none    Drug Interactions  No relevant drug interactions were noted.    Medication System Management  Patient's preferred pharmacy: [facility]  Adherence/Organization: no issues reported  Affordability/Accessibility: no issues reported    Objective   Allergies[1]  Social History     Social History Narrative    Not on file      Medication Review  Current Outpatient Medications   Medication Instructions    amLODIPine (NORVASC) 10 mg, oral, Daily    aspirin 81 mg EC tablet 1 tablet, Daily    blood-glucose meter (OneTouch Ultra2 Meter) misc Use as directed to monitor blood glucose once daily    blood-glucose sensor (FreeStyle Cory 3 Plus Sensor) device Apply 1 sensor to the back of the upper arm every 15 days. Change sensor every 15 days.    calcium carb/vit D3/minerals (CALCIUM 600 + MINERALS ORAL) 1,200 mg, Daily  "   carvedilol (COREG) 25 mg, oral, 2 times daily (morning and late afternoon)    cholecalciferol (VITAMIN D-3) 25 mcg, Daily    CRANBERRY ORAL 1 tablet, 2 times daily    cyanocobalamin, vitamin B-12, 1,000 mcg tablet, sublingual Place under the tongue. one tab sublingual daily five days per week, monday thru friday    diabetic supplies, miscellan. misc Onetouch ultrasoft lancets misc    diclofenac sodium (VOLTAREN) 2 g, Topical, 2 times daily PRN, Can increase to three times per day for up to 10 days if needed.    empagliflozin (Jardiance) 25 mg tablet TAKE 1 TABLET BY MOUTH EVERY MORNING    ezetimibe (ZETIA) 10 mg, oral, Daily, DAILY    fluvastatin (LESCOL) 40 mg, oral, Nightly    FreeStyle Cory 3 [place] misc Use as instructed to monitor blood glucose four times daily.    hydroCHLOROthiazide (HYDRODIURIL) 12.5 mg, oral, Daily, DAILY    insulin glargine (Lantus Solostar U-100 Insulin) 100 unit/mL (3 mL) pen Inject 40 units under the skin once daily. Take as directed per insulin instructions.    insulin lispro (HUMALOG) 12 Units, subcutaneous, 3 times daily (morning, midday, late afternoon), Except on swim days, 10 units am and then 12 units at lunch and supper.Take as directed per insulin instructions    lancets (MICROLET LANCET MISC) Test sugar 6 times a day    loratadine (CLARITIN REDITABS) 10 mg, Daily    MAGNESIUM CITRATE ORAL 1 tablet, Nightly    meclizine (ANTIVERT) 12.5 mg, oral, 3 times daily PRN    omega-3/dha/epa/fish oil (OMEGA-3 ORAL) 1 capsule, 2 times daily    OneTouch Ultra Test strip Use as directed once daily to test blood glucose.    Ozempic 2 mg, subcutaneous, Once Weekly    pen needle, diabetic 32 gauge x 5/32\" needle Use as directed four times daily with insulin injections. Change pen needle with each insulin injection.    psyllium (Metamucil) 3.4 gram packet Nightly      Vitals  BP Readings from Last 2 Encounters:   06/10/25 129/72   05/13/25 108/65     BMI Readings from Last 1 Encounters: "   06/10/25 31.59 kg/m²      Labs  A1C  Lab Results   Component Value Date    HGBA1C 7.6 (A) 04/09/2025    HGBA1C 7.5 (A) 12/17/2024    HGBA1C 7.5 (A) 09/16/2024     BMP  Lab Results   Component Value Date    CALCIUM 9.8 06/04/2025     06/04/2025    K 4.8 06/04/2025    CO2 30 06/04/2025     06/04/2025    BUN 25 (H) 06/04/2025    CREATININE 1.39 (H) 06/04/2025    EGFR 39 (L) 06/04/2025     LFTs  Lab Results   Component Value Date    ALT 21 06/04/2025    AST 18 06/04/2025    ALKPHOS 82 06/04/2025    BILITOT 1.2 06/04/2025     FLP  Lab Results   Component Value Date    TRIG 168 (H) 09/10/2024    CHOL 136 09/10/2024    LDLF 79 09/16/2023    LDLCALC 60 09/10/2024    HDL 42.4 09/10/2024     Urine Microalbumin  Lab Results   Component Value Date    MICROALBCREA  12/17/2024      Comment:      One or more analytes used in this calculation is outside of the analytical measurement range. Calculation cannot be performed.     Weight Management  Wt Readings from Last 3 Encounters:   06/10/25 70.9 kg (156 lb 6.7 oz)   05/13/25 69.4 kg (153 lb)   04/09/25 68.5 kg (151 lb)      There is no height or weight on file to calculate BMI.     Assessment/Plan   Problem List Items Addressed This Visit    None    Clinical Pharmacist follow-up: 9/19/2025 at 9 am, Telehealth visit  Patient is not followed in [facility].     Continue all meds under the continuation of care with the referring provider and clinical pharmacy team.    Thank you,  Armand De La Torre, PharmD    Clinical Pharmacist  [phone]    Verbal consent to manage patient's drug therapy was obtained from the patient. They were informed they may decline to participate or withdraw from participation in pharmacy services at any time.        [1]   Allergies  Allergen Reactions    Propoxyphene N-Acetaminophen Drowsiness    Adhesive Tape-Silicones Other     Steristrips - takes skin off    Cefaclor Palpitations    Latex Rash

## 2025-08-13 ENCOUNTER — RESULTS FOLLOW-UP (OUTPATIENT)
Dept: CARDIOLOGY | Facility: CLINIC | Age: 80
End: 2025-08-13
Payer: MEDICARE

## 2025-08-13 LAB
ALBUMIN SERPL-MCNC: 4.4 G/DL (ref 3.6–5.1)
ALBUMIN/CREAT UR: 4 MG/G CREAT
ALP SERPL-CCNC: 82 U/L (ref 37–153)
ALT SERPL-CCNC: 40 U/L (ref 6–29)
ANION GAP SERPL CALCULATED.4IONS-SCNC: 8 MMOL/L (CALC) (ref 7–17)
AST SERPL-CCNC: 24 U/L (ref 10–35)
BASOPHILS # BLD AUTO: 38 CELLS/UL (ref 0–200)
BASOPHILS NFR BLD AUTO: 0.6 %
BILIRUB SERPL-MCNC: 1.2 MG/DL (ref 0.2–1.2)
BUN SERPL-MCNC: 23 MG/DL (ref 7–25)
CALCIUM SERPL-MCNC: 9.3 MG/DL (ref 8.6–10.4)
CHLORIDE SERPL-SCNC: 102 MMOL/L (ref 98–110)
CHOLEST SERPL-MCNC: 143 MG/DL
CHOLEST/HDLC SERPL: 2.9 (CALC)
CO2 SERPL-SCNC: 29 MMOL/L (ref 20–32)
CREAT SERPL-MCNC: 1.36 MG/DL (ref 0.6–1)
CREAT UR-MCNC: 72 MG/DL (ref 20–275)
EGFRCR SERPLBLD CKD-EPI 2021: 40 ML/MIN/1.73M2
EOSINOPHIL # BLD AUTO: 202 CELLS/UL (ref 15–500)
EOSINOPHIL NFR BLD AUTO: 3.2 %
ERYTHROCYTE [DISTWIDTH] IN BLOOD BY AUTOMATED COUNT: 12.5 % (ref 11–15)
EST. AVERAGE GLUCOSE BLD GHB EST-MCNC: 163 MG/DL
EST. AVERAGE GLUCOSE BLD GHB EST-SCNC: 9 MMOL/L
GLUCOSE SERPL-MCNC: 184 MG/DL (ref 65–99)
HBA1C MFR BLD: 7.3 %
HCT VFR BLD AUTO: 48.2 % (ref 35–45)
HDLC SERPL-MCNC: 49 MG/DL
HGB BLD-MCNC: 16 G/DL (ref 11.7–15.5)
LDLC SERPL CALC-MCNC: 71 MG/DL (CALC)
LYMPHOCYTES # BLD AUTO: 2463 CELLS/UL (ref 850–3900)
LYMPHOCYTES NFR BLD AUTO: 39.1 %
MCH RBC QN AUTO: 30.3 PG (ref 27–33)
MCHC RBC AUTO-ENTMCNC: 33.2 G/DL (ref 32–36)
MCV RBC AUTO: 91.3 FL (ref 80–100)
MICROALBUMIN UR-MCNC: 0.3 MG/DL
MONOCYTES # BLD AUTO: 460 CELLS/UL (ref 200–950)
MONOCYTES NFR BLD AUTO: 7.3 %
NEUTROPHILS # BLD AUTO: 3137 CELLS/UL (ref 1500–7800)
NEUTROPHILS NFR BLD AUTO: 49.8 %
NONHDLC SERPL-MCNC: 94 MG/DL (CALC)
PLATELET # BLD AUTO: 201 THOUSAND/UL (ref 140–400)
PMV BLD REES-ECKER: 9.7 FL (ref 7.5–12.5)
POTASSIUM SERPL-SCNC: 4 MMOL/L (ref 3.5–5.3)
PROT SERPL-MCNC: 6.7 G/DL (ref 6.1–8.1)
RBC # BLD AUTO: 5.28 MILLION/UL (ref 3.8–5.1)
SODIUM SERPL-SCNC: 139 MMOL/L (ref 135–146)
TRIGL SERPL-MCNC: 155 MG/DL
WBC # BLD AUTO: 6.3 THOUSAND/UL (ref 3.8–10.8)

## 2025-08-18 ENCOUNTER — APPOINTMENT (OUTPATIENT)
Dept: PRIMARY CARE | Facility: CLINIC | Age: 80
End: 2025-08-18
Payer: MEDICARE

## 2025-08-18 VITALS
BODY MASS INDEX: 30.64 KG/M2 | HEART RATE: 82 BPM | RESPIRATION RATE: 18 BRPM | SYSTOLIC BLOOD PRESSURE: 114 MMHG | DIASTOLIC BLOOD PRESSURE: 68 MMHG | WEIGHT: 152 LBS | HEIGHT: 59 IN

## 2025-08-18 DIAGNOSIS — I10 PRIMARY HYPERTENSION: ICD-10-CM

## 2025-08-18 DIAGNOSIS — M40.00 KYPHOSIS (ACQUIRED) (POSTURAL): ICD-10-CM

## 2025-08-18 DIAGNOSIS — Z23 INFLUENZA VACCINE NEEDED: ICD-10-CM

## 2025-08-18 DIAGNOSIS — Z23 NEED FOR COVID-19 VACCINE: ICD-10-CM

## 2025-08-18 DIAGNOSIS — E11.69 HYPERLIPIDEMIA ASSOCIATED WITH TYPE 2 DIABETES MELLITUS: ICD-10-CM

## 2025-08-18 DIAGNOSIS — E11.9 TYPE 2 DIABETES MELLITUS WITH INSULIN THERAPY (MULTI): ICD-10-CM

## 2025-08-18 DIAGNOSIS — Z12.31 VISIT FOR SCREENING MAMMOGRAM: ICD-10-CM

## 2025-08-18 DIAGNOSIS — R74.8 ABNORMAL TRANSAMINASES: ICD-10-CM

## 2025-08-18 DIAGNOSIS — Z79.82 ASPIRIN LONG-TERM USE: ICD-10-CM

## 2025-08-18 DIAGNOSIS — D75.1 ERYTHROCYTOSIS: ICD-10-CM

## 2025-08-18 DIAGNOSIS — Z01.419 VISIT FOR GYNECOLOGIC EXAMINATION: ICD-10-CM

## 2025-08-18 DIAGNOSIS — E78.5 HYPERLIPIDEMIA ASSOCIATED WITH TYPE 2 DIABETES MELLITUS: ICD-10-CM

## 2025-08-18 DIAGNOSIS — Z87.898 HISTORY OF VERTIGO: ICD-10-CM

## 2025-08-18 DIAGNOSIS — Z79.4 TYPE 2 DIABETES MELLITUS WITH INSULIN THERAPY (MULTI): ICD-10-CM

## 2025-08-18 DIAGNOSIS — M54.50 RIGHT LUMBAR PAIN: ICD-10-CM

## 2025-08-18 DIAGNOSIS — Z98.890 HISTORY OF STENT INSERTION OF RENAL ARTERY: ICD-10-CM

## 2025-08-18 DIAGNOSIS — Z71.85 IMMUNIZATION COUNSELING: ICD-10-CM

## 2025-08-18 DIAGNOSIS — Z00.00 ENCOUNTER FOR SUBSEQUENT ANNUAL WELLNESS VISIT (AWV) IN MEDICARE PATIENT: Primary | ICD-10-CM

## 2025-08-18 DIAGNOSIS — D89.89 KAPPA LIGHT CHAIN DISEASE (MULTI): ICD-10-CM

## 2025-08-18 DIAGNOSIS — N36.2 URETHRAL CARUNCLE: ICD-10-CM

## 2025-08-18 DIAGNOSIS — Z23 NEED FOR PNEUMOCOCCAL 20-VALENT CONJUGATE VACCINATION: ICD-10-CM

## 2025-08-18 DIAGNOSIS — M67.432 GANGLION OF LEFT WRIST: ICD-10-CM

## 2025-08-18 DIAGNOSIS — Z78.9 FULL CODE STATUS: ICD-10-CM

## 2025-08-18 DIAGNOSIS — N18.32 STAGE 3B CHRONIC KIDNEY DISEASE (MULTI): ICD-10-CM

## 2025-08-18 DIAGNOSIS — E11.9 ENCOUNTER FOR DIABETIC FOOT EXAM (MULTI): ICD-10-CM

## 2025-08-18 DIAGNOSIS — Z00.00 ENCOUNTER FOR ANNUAL PHYSICAL EXAM: ICD-10-CM

## 2025-08-18 DIAGNOSIS — E11.21 DIABETIC NEPHROPATHY ASSOCIATED WITH TYPE 2 DIABETES MELLITUS: ICD-10-CM

## 2025-08-18 PROCEDURE — 99397 PER PM REEVAL EST PAT 65+ YR: CPT | Performed by: INTERNAL MEDICINE

## 2025-08-18 PROCEDURE — 1126F AMNT PAIN NOTED NONE PRSNT: CPT | Performed by: INTERNAL MEDICINE

## 2025-08-18 PROCEDURE — G0439 PPPS, SUBSEQ VISIT: HCPCS | Performed by: INTERNAL MEDICINE

## 2025-08-18 PROCEDURE — 3074F SYST BP LT 130 MM HG: CPT | Performed by: INTERNAL MEDICINE

## 2025-08-18 PROCEDURE — 1170F FXNL STATUS ASSESSED: CPT | Performed by: INTERNAL MEDICINE

## 2025-08-18 PROCEDURE — 99497 ADVNCD CARE PLAN 30 MIN: CPT | Performed by: INTERNAL MEDICINE

## 2025-08-18 PROCEDURE — 1159F MED LIST DOCD IN RCRD: CPT | Performed by: INTERNAL MEDICINE

## 2025-08-18 PROCEDURE — 1036F TOBACCO NON-USER: CPT | Performed by: INTERNAL MEDICINE

## 2025-08-18 PROCEDURE — 99214 OFFICE O/P EST MOD 30 MIN: CPT | Performed by: INTERNAL MEDICINE

## 2025-08-18 PROCEDURE — 1160F RVW MEDS BY RX/DR IN RCRD: CPT | Performed by: INTERNAL MEDICINE

## 2025-08-18 PROCEDURE — 3078F DIAST BP <80 MM HG: CPT | Performed by: INTERNAL MEDICINE

## 2025-08-18 RX ORDER — HYDROCHLOROTHIAZIDE 25 MG/1
12.5 TABLET ORAL DAILY
Qty: 45 TABLET | Refills: 3 | Status: SHIPPED | OUTPATIENT
Start: 2025-08-18

## 2025-08-18 RX ORDER — CARVEDILOL 25 MG/1
25 TABLET ORAL
Qty: 180 TABLET | Refills: 3 | Status: SHIPPED | OUTPATIENT
Start: 2025-08-18 | End: 2026-08-18

## 2025-08-18 RX ORDER — EZETIMIBE 10 MG/1
10 TABLET ORAL DAILY
Qty: 90 TABLET | Refills: 3 | Status: SHIPPED | OUTPATIENT
Start: 2025-08-18 | End: 2026-08-18

## 2025-08-18 RX ORDER — MECLIZINE HCL 12.5 MG 12.5 MG/1
12.5 TABLET ORAL 3 TIMES DAILY PRN
Qty: 15 TABLET | Refills: 2 | Status: SHIPPED | OUTPATIENT
Start: 2025-08-18

## 2025-08-18 RX ORDER — ESTRADIOL 0.1 MG/G
CREAM VAGINAL
Qty: 42.5 G | Refills: 1 | Status: SHIPPED | OUTPATIENT
Start: 2025-08-18

## 2025-08-18 RX ORDER — FLUVASTATIN 40 MG/1
40 CAPSULE ORAL NIGHTLY
Qty: 90 CAPSULE | Refills: 3 | Status: SHIPPED | OUTPATIENT
Start: 2025-08-18 | End: 2026-08-18

## 2025-08-18 SDOH — ECONOMIC STABILITY: FOOD INSECURITY: WITHIN THE PAST 12 MONTHS, THE FOOD YOU BOUGHT JUST DIDN'T LAST AND YOU DIDN'T HAVE MONEY TO GET MORE.: NEVER TRUE

## 2025-08-18 SDOH — HEALTH STABILITY: PHYSICAL HEALTH: ON AVERAGE, HOW MANY MINUTES DO YOU ENGAGE IN EXERCISE AT THIS LEVEL?: 60 MIN

## 2025-08-18 SDOH — ECONOMIC STABILITY: INCOME INSECURITY: IN THE LAST 12 MONTHS, WAS THERE A TIME WHEN YOU WERE NOT ABLE TO PAY THE MORTGAGE OR RENT ON TIME?: NO

## 2025-08-18 SDOH — ECONOMIC STABILITY: FOOD INSECURITY: WITHIN THE PAST 12 MONTHS, YOU WORRIED THAT YOUR FOOD WOULD RUN OUT BEFORE YOU GOT MONEY TO BUY MORE.: NEVER TRUE

## 2025-08-18 SDOH — HEALTH STABILITY: PHYSICAL HEALTH: ON AVERAGE, HOW MANY DAYS PER WEEK DO YOU ENGAGE IN MODERATE TO STRENUOUS EXERCISE (LIKE A BRISK WALK)?: 5 DAYS

## 2025-08-18 SDOH — ECONOMIC STABILITY: GENERAL
WHICH OF THE FOLLOWING DO YOU KNOW HOW TO USE AND HAVE ACCESS TO EVERY DAY? (CHOOSE ALL THAT APPLY): DESKTOP COMPUTER, LAPTOP COMPUTER, OR TABLET WITH BROADBAND INTERNET CONNECTION;SMARTPHONE WITH CELLULAR DATA PLAN

## 2025-08-18 ASSESSMENT — SOCIAL DETERMINANTS OF HEALTH (SDOH)
DO YOU BELONG TO ANY CLUBS OR ORGANIZATIONS SUCH AS CHURCH GROUPS UNIONS, FRATERNAL OR ATHLETIC GROUPS, OR SCHOOL GROUPS?: YES
WITHIN THE LAST YEAR, HAVE YOU BEEN HUMILIATED OR EMOTIONALLY ABUSED IN OTHER WAYS BY YOUR PARTNER OR EX-PARTNER?: NO
WITHIN THE LAST YEAR, HAVE TO BEEN RAPED OR FORCED TO HAVE ANY KIND OF SEXUAL ACTIVITY BY YOUR PARTNER OR EX-PARTNER?: NO
HOW HARD IS IT FOR YOU TO PAY FOR THE VERY BASICS LIKE FOOD, HOUSING, MEDICAL CARE, AND HEATING?: NOT HARD AT ALL
HOW OFTEN DO YOU ATTEND CHURCH OR RELIGIOUS SERVICES?: MORE THAN 4 TIMES PER YEAR
HOW OFTEN DO YOU ATTENT MEETINGS OF THE CLUB OR ORGANIZATION YOU BELONG TO?: MORE THAN 4 TIMES PER YEAR
IN THE PAST 12 MONTHS, HAS THE ELECTRIC, GAS, OIL, OR WATER COMPANY THREATENED TO SHUT OFF SERVICE IN YOUR HOME?: NO
WITHIN THE LAST YEAR, HAVE YOU BEEN KICKED, HIT, SLAPPED, OR OTHERWISE PHYSICALLY HURT BY YOUR PARTNER OR EX-PARTNER?: NO
IN A TYPICAL WEEK, HOW MANY TIMES DO YOU TALK ON THE PHONE WITH FAMILY, FRIENDS, OR NEIGHBORS?: MORE THAN THREE TIMES A WEEK
WITHIN THE LAST YEAR, HAVE YOU BEEN AFRAID OF YOUR PARTNER OR EX-PARTNER?: NO
HOW OFTEN DO YOU GET TOGETHER WITH FRIENDS OR RELATIVES?: MORE THAN THREE TIMES A WEEK

## 2025-08-18 ASSESSMENT — ACTIVITIES OF DAILY LIVING (ADL)
USING TELEPHONE: INDEPENDENT
HEARING - RIGHT EAR: FUNCTIONAL
DRESSING YOURSELF: INDEPENDENT
FEEDING YOURSELF: INDEPENDENT
MANAGING FINANCES: INDEPENDENT
TOILETING: INDEPENDENT
USING TRANSPORTATION: INDEPENDENT
STIL DRIVING: YES
JUDGMENT_ADEQUATE_SAFELY_COMPLETE_DAILY_ACTIVITIES: YES
WALKS IN HOME: INDEPENDENT
HEARING - LEFT EAR: FUNCTIONAL
PILL BOX USED: YES
PATIENT'S MEMORY ADEQUATE TO SAFELY COMPLETE DAILY ACTIVITIES?: YES
DOING HOUSEWORK: INDEPENDENT
NEEDS ASSISTANCE WITH FOOD: INDEPENDENT
TAKING MEDICATION: INDEPENDENT
GROCERY SHOPPING: INDEPENDENT
PREPARING MEALS: INDEPENDENT
GROOMING: INDEPENDENT
ADEQUATE_TO_COMPLETE_ADL: YES
BATHING: INDEPENDENT

## 2025-08-18 ASSESSMENT — ANXIETY QUESTIONNAIRES
7. FEELING AFRAID AS IF SOMETHING AWFUL MIGHT HAPPEN: NOT AT ALL
1. FEELING NERVOUS, ANXIOUS, OR ON EDGE: NOT AT ALL
GAD7 TOTAL SCORE: 0
6. BECOMING EASILY ANNOYED OR IRRITABLE: NOT AT ALL
4. TROUBLE RELAXING: NOT AT ALL
5. BEING SO RESTLESS THAT IT IS HARD TO SIT STILL: NOT AT ALL
IF YOU CHECKED OFF ANY PROBLEMS ON THIS QUESTIONNAIRE, HOW DIFFICULT HAVE THESE PROBLEMS MADE IT FOR YOU TO DO YOUR WORK, TAKE CARE OF THINGS AT HOME, OR GET ALONG WITH OTHER PEOPLE: NOT DIFFICULT AT ALL
2. NOT BEING ABLE TO STOP OR CONTROL WORRYING: NOT AT ALL
3. WORRYING TOO MUCH ABOUT DIFFERENT THINGS: NOT AT ALL

## 2025-08-18 ASSESSMENT — PAIN SCALES - GENERAL: PAINLEVEL_OUTOF10: 0-NO PAIN

## 2025-08-18 ASSESSMENT — PATIENT HEALTH QUESTIONNAIRE - PHQ9
SUM OF ALL RESPONSES TO PHQ9 QUESTIONS 1 AND 2: 0
1. LITTLE INTEREST OR PLEASURE IN DOING THINGS: NOT AT ALL
2. FEELING DOWN, DEPRESSED OR HOPELESS: NOT AT ALL
SUM OF ALL RESPONSES TO PHQ9 QUESTIONS 1 AND 2: 0
2. FEELING DOWN, DEPRESSED OR HOPELESS: NOT AT ALL
1. LITTLE INTEREST OR PLEASURE IN DOING THINGS: NOT AT ALL

## 2025-08-18 ASSESSMENT — LIFESTYLE VARIABLES
HOW MANY STANDARD DRINKS CONTAINING ALCOHOL DO YOU HAVE ON A TYPICAL DAY: PATIENT DOES NOT DRINK
HOW OFTEN DO YOU HAVE SIX OR MORE DRINKS ON ONE OCCASION: NEVER
SKIP TO QUESTIONS 9-10: 1
AUDIT-C TOTAL SCORE: 0
HOW OFTEN DO YOU HAVE A DRINK CONTAINING ALCOHOL: NEVER

## 2025-08-18 ASSESSMENT — ENCOUNTER SYMPTOMS
DEPRESSION: 0
LOSS OF SENSATION IN FEET: 0
OCCASIONAL FEELINGS OF UNSTEADINESS: 0

## 2025-08-20 ENCOUNTER — HOSPITAL ENCOUNTER (OUTPATIENT)
Dept: RADIOLOGY | Facility: CLINIC | Age: 80
Discharge: HOME | End: 2025-08-20
Payer: MEDICARE

## 2025-08-20 DIAGNOSIS — M54.50 RIGHT LUMBAR PAIN: ICD-10-CM

## 2025-08-20 PROCEDURE — 72110 X-RAY EXAM L-2 SPINE 4/>VWS: CPT

## 2025-08-20 PROCEDURE — 72110 X-RAY EXAM L-2 SPINE 4/>VWS: CPT | Performed by: RADIOLOGY

## 2025-08-26 PROCEDURE — RXMED WILLOW AMBULATORY MEDICATION CHARGE

## 2025-08-28 ENCOUNTER — PHARMACY VISIT (OUTPATIENT)
Dept: PHARMACY | Facility: CLINIC | Age: 80
End: 2025-08-28
Payer: MEDICARE

## 2025-09-19 ENCOUNTER — APPOINTMENT (OUTPATIENT)
Dept: PHARMACY | Facility: HOSPITAL | Age: 80
End: 2025-09-19
Payer: MEDICARE

## 2025-11-04 ENCOUNTER — APPOINTMENT (OUTPATIENT)
Dept: CARDIOLOGY | Facility: CLINIC | Age: 80
End: 2025-11-04
Payer: MEDICARE

## 2025-11-11 ENCOUNTER — APPOINTMENT (OUTPATIENT)
Dept: PRIMARY CARE | Facility: CLINIC | Age: 80
End: 2025-11-11
Payer: MEDICARE

## 2026-08-18 ENCOUNTER — APPOINTMENT (OUTPATIENT)
Dept: PRIMARY CARE | Facility: CLINIC | Age: 81
End: 2026-08-18
Payer: MEDICARE